# Patient Record
Sex: FEMALE
[De-identification: names, ages, dates, MRNs, and addresses within clinical notes are randomized per-mention and may not be internally consistent; named-entity substitution may affect disease eponyms.]

---

## 2017-04-24 ENCOUNTER — HOSPITAL ENCOUNTER (EMERGENCY)
Dept: HOSPITAL 14 - H.ER | Age: 82
Discharge: HOME | End: 2017-04-24
Payer: MEDICARE

## 2017-04-24 VITALS — OXYGEN SATURATION: 98 %

## 2017-04-24 VITALS
SYSTOLIC BLOOD PRESSURE: 120 MMHG | TEMPERATURE: 98 F | HEART RATE: 72 BPM | RESPIRATION RATE: 20 BRPM | DIASTOLIC BLOOD PRESSURE: 70 MMHG

## 2017-04-24 DIAGNOSIS — Z79.82: ICD-10-CM

## 2017-04-24 DIAGNOSIS — I10: ICD-10-CM

## 2017-04-24 DIAGNOSIS — M25.551: ICD-10-CM

## 2017-04-24 DIAGNOSIS — M79.604: Primary | ICD-10-CM
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
## 2017-04-24 DIAGNOSIS — E03.9: ICD-10-CM

## 2017-04-24 DIAGNOSIS — J44.9: ICD-10-CM

## 2017-04-24 DIAGNOSIS — E78.00: ICD-10-CM

## 2017-04-24 DIAGNOSIS — F03.90: ICD-10-CM

## 2017-04-24 DIAGNOSIS — E11.9: ICD-10-CM

## 2017-04-24 DIAGNOSIS — M25.561: ICD-10-CM
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
## 2017-04-24 DIAGNOSIS — Z79.84: ICD-10-CM

## 2017-04-24 NOTE — ED PDOC
Lower Extremity Pain/Injury


Time Seen by Provider: 04/24/17 15:18


Chief Complaint (Nursing): Lower Extremity Problem/Injury


Chief Complaint (Provider): Pain in leg


History/Exam Limitations: no limitations


Onset/Duration Of Symptoms: Days (3)


Current Symptoms Are (Timing): Still Present


Additional Complaint(s): 


Pt. was at [place] visiting her son.  She uses a walker usually and did 

not use it and accidentally fell in the bathroom onto the right side.  Pt. has 

pain to the right femur, hip, and knee.  Had x-rays done at an urgent care with 

no findings.  Here as pain is there and trouble walking on the right leg.  No 

numbness, tingles, headaches, neck pain, arm pain, abd pain.  No back pain, 

incontinence, constipation.  





Past Medical History


Reviewed: Nursing Documentation, Vital Signs


Vital Signs: 





 Last Vital Signs











Temp  98.0 F   04/24/17 15:03


 


Pulse  93 H  04/24/17 15:03


 


Resp  16   04/24/17 15:03


 


BP  143/74   04/24/17 15:03


 


Pulse Ox  98   04/24/17 15:03














- Medical History


PMH: Arthritis, Asthma, COPD, Dementia, Diabetes, HTN, Hypercholesterolemia, 

Hypothyroidism





- Family History


Family History: States: Unknown Family Hx





- Living Arrangements


Living Arrangements: With Family





- Social History


Current smoker - smoking cessation education provided: No


Alcohol: None


Drugs: Denies





- Immunization History


Hx Tetanus Toxoid Vaccination: No


Hx Influenza Vaccination: No


Hx Pneumococcal Vaccination: No





- Home Medications


Home Medications: 


 Ambulatory Orders











 Medication  Instructions  Recorded


 


Albuterol Sulfate [Albuterol 3 ml IH Q4 PRN 02/02/14





Sulfate 3 ml]  


 


Aspirin [Aspirin EC] 325 mg PO DAILY 02/02/14


 


Atorvastatin Calcium [Lipitor] 20 mg PO DAILY 02/02/14


 


DULoxetine [Cymbalta] 60 mg PO DAILY 02/02/14


 


Donepezil Hydrochloride 10 mg PO DAILY 02/02/14


 


Levothyroxine [Synthroid] 0.112 mg PO DAILY 02/02/14


 


Lorazepam 0.5 mg PO DAILY 02/02/14


 


Metformin Hydrochloride [Metformin] 500 mg PO BID 02/02/14


 


Montelukast [Singulair] 10 mg PO DAILY 02/02/14


 


Nitroglycerin 0.4 mg SL PRN PRN 02/02/14


 


Omeprazole 20 mg PO DAILY 02/02/14


 


Tiotropium Bromide Inhaler 1 inhaler IH PRN PRN 02/02/14





[Spiriva Inhalation Handihaler  





Device]  


 


Tramadol Hydrochloride [Tramadol 50 mg PO BID PRN 02/02/14





HCl]  


 


Valsartan [Diovan] 80 mg PO DAILY 02/02/14


 


Zolpidem Tartrate 5 mg PO HS PRN 02/02/14


 


Docusate Sodium [Colace] 100 mg PO BID #30 sgl 04/30/15


 


Methylprednisolone [Medrol Dosepak] 4 mg PO DAILY #1 packet 04/30/15


 


Oxycodone HCl/Acetaminophen 1 tab PO Q6 PRN #24 tab 04/30/15





[Percocet 325 mg-5 mg]  


 


Ibuprofen [Motrin] 600 mg PO TID 7 Days 04/24/17














- Allergies


Allergies/Adverse Reactions: 


 Allergies











Allergy/AdvReac Type Severity Reaction Status Date / Time


 


No Known Allergies Allergy   Verified 04/30/15 12:45














Review of Systems


ROS Statement: Except As Marked, All Systems Reviewed And Found Negative


Musculoskeletal: Positive for: Leg Pain





Physical Exam





- Reviewed


Nursing Documentation Reviewed: Yes


Vital Signs Reviewed: Yes





- Physical Exam


Appears: Positive for: Well, Non-toxic, No Acute Distress


Head Exam: Positive for: ATRAUMATIC, NORMAL INSPECTION, NORMOCEPHALIC


Skin: Positive for: Normal Color, Warm, DRY


Eye Exam: Positive for: Normal appearance, EOMI, PERRL


Neck: Positive for: Normal, Painless ROM


Cardiovascular/Chest: Positive for: Regular Rate, Rhythm


Respiratory: Positive for: CNT, Normal Breath Sounds


Gastrointestinal/Abdominal: Positive for: Normal Exam, Bowel Sounds, Soft.  

Negative for: Tenderness


Back: Positive for: Normal Inspection.  Negative for: L CVA Tenderness, R CVA 

Tenderness


Extremity: Positive for: Normal ROM, Tenderness (R hip, femure, and R knee;  no 

laxity of knee joints.  Pain is generalized; no swelling;  Has full ROM.).  

Negative for: Pedal Edema, Calf Tenderness


Neurologic/Psych: Positive for: Alert, Oriented





- ECG


O2 Sat by Pulse Oximetry: 98





- Radiology


X-Ray: Interpreted by Me


X-Ray Interpretation: No Acute Disease





- CT Scan/US


  ** ct


Other Rad Studies (CT/US): Read By Radiologist


Other Rad Interpretation: no acute





- Progress


ED Course And Treament: 


1810:  Stable.  Pain improved.  No fx.  Fu with pcp.  AAOx3.  Family will take 

pt. home.  





Disposition





- Clinical Impression


Clinical Impression: 


 Leg pain





- Patient ED Disposition


Is Patient to be Admitted: No


Counseled Patient/Family Regarding: Studies Performed, Diagnosis, Need For 

Followup, Rx Given





- Disposition


Referrals: 


[facility] [Outside] - 04/25/17


Disposition: Routine/Home


Disposition Time: 18:12


Condition: STABLE


Additional Instructions: 


Return if not better in 3 days. 


Prescriptions: 


Ibuprofen [Motrin] 600 mg PO TID 7 Days


Instructions:  Leg Pain (ED)

## 2017-04-24 NOTE — CT
Indication: Hip and knee pain 



Noncontrast CT of the right knee 



Comparison: None available 



Technique: Noncontrast axial images of the right knee.  Sagittal 

coronal reformatted images were generated and reviewed.   



This CT exam was performed using 1 or more of the falling dose 

reduction techniques: Automated exposure control, adjustment of the 

MAA and/or kV according to patient size, and/or use of iterative 

reconstruction technique. 



Total exam DLP: 247.80 



Findings: 



Osseous demineralization limits evaluation for acute fracture lines. 

No acute fracture. No dislocation. Degenerative changes including 

mild joint space narrowing of the medial and lateral compartments. 

Tenting of the intercondylar notch. No significant joint effusion. 



Vascular calcifications. Soft tissues appear grossly unremarkable. No 

radiopaque foreign bodies. No focal fluid collection. 



Impression: 



Osseous demineralization.  Degenerative changes.  Suggest MRI for 

further evaluation if indicated.

## 2017-04-25 NOTE — RAD
PROCEDURE:  Right Femur Radiographs.



HISTORY:

fall and pain



COMPARISON:

None.



TECHNIQUE:

AP and Lateral Radiographs of the right femur.



FINDINGS:



FEMUR:

No evidence of acute fracture.  No evidence of bony lesion. Diffuse 

osteopenia is noted.



SOFT TISSUES:

Soft tissue calcification. 



OTHER FINDINGS:

None.



IMPRESSION:

No evidence of acute pathology.

## 2018-11-01 ENCOUNTER — APPOINTMENT (EMERGENCY)
Dept: RADIOLOGY | Facility: HOSPITAL | Age: 83
DRG: 871 | End: 2018-11-01
Payer: MEDICARE

## 2018-11-01 ENCOUNTER — HOSPITAL ENCOUNTER (INPATIENT)
Facility: HOSPITAL | Age: 83
LOS: 6 days | Discharge: NURSING, REHAB OR LONG-TERM CARE | DRG: 871 | End: 2018-11-07
Attending: EMERGENCY MEDICINE | Admitting: INTERNAL MEDICINE
Payer: MEDICARE

## 2018-11-01 DIAGNOSIS — A41.9 SEPSIS (HCC): Primary | ICD-10-CM

## 2018-11-01 DIAGNOSIS — J18.9 PNEUMONIA: ICD-10-CM

## 2018-11-01 DIAGNOSIS — J18.9 LEFT UPPER LOBE PNEUMONIA: ICD-10-CM

## 2018-11-01 PROBLEM — I71.4 ABDOMINAL AORTIC ANEURYSM (AAA) 3.0 CM TO 5.0 CM IN DIAMETER IN FEMALE (HCC): Status: ACTIVE | Noted: 2018-11-01

## 2018-11-01 PROBLEM — I71.40 ABDOMINAL AORTIC ANEURYSM (AAA) 3.0 CM TO 5.0 CM IN DIAMETER IN FEMALE: Status: ACTIVE | Noted: 2018-11-01

## 2018-11-01 PROBLEM — I25.118 CORONARY ARTERY DISEASE OF NATIVE ARTERY OF NATIVE HEART WITH STABLE ANGINA PECTORIS (HCC): Status: ACTIVE | Noted: 2018-11-01

## 2018-11-01 PROBLEM — E11.9 TYPE 2 DIABETES MELLITUS WITHOUT COMPLICATION, WITHOUT LONG-TERM CURRENT USE OF INSULIN (HCC): Status: ACTIVE | Noted: 2018-11-01

## 2018-11-01 PROBLEM — E03.9 HYPOTHYROIDISM: Status: ACTIVE | Noted: 2018-11-01

## 2018-11-01 PROBLEM — J45.20 INTERMITTENT ASTHMA: Status: ACTIVE | Noted: 2018-11-01

## 2018-11-01 PROBLEM — G92.8 TOXIC METABOLIC ENCEPHALOPATHY: Status: ACTIVE | Noted: 2018-11-01

## 2018-11-01 PROBLEM — E78.5 HLD (HYPERLIPIDEMIA): Status: ACTIVE | Noted: 2018-11-01

## 2018-11-01 LAB
ALBUMIN SERPL BCP-MCNC: 2.9 G/DL (ref 3.5–5)
ALP SERPL-CCNC: 93 U/L (ref 46–116)
ALT SERPL W P-5'-P-CCNC: 11 U/L (ref 12–78)
ANION GAP SERPL CALCULATED.3IONS-SCNC: 4 MMOL/L (ref 4–13)
APTT PPP: 31 SECONDS (ref 24–36)
AST SERPL W P-5'-P-CCNC: 15 U/L (ref 5–45)
ATRIAL RATE: 111 BPM
BACTERIA UR QL AUTO: ABNORMAL /HPF
BASOPHILS # BLD MANUAL: 0 THOUSAND/UL (ref 0–0.1)
BASOPHILS NFR MAR MANUAL: 0 % (ref 0–1)
BILIRUB SERPL-MCNC: 0.46 MG/DL (ref 0.2–1)
BILIRUB UR QL STRIP: NEGATIVE
BUN SERPL-MCNC: 19 MG/DL (ref 5–25)
CALCIUM SERPL-MCNC: 8.4 MG/DL (ref 8.3–10.1)
CHLORIDE SERPL-SCNC: 101 MMOL/L (ref 100–108)
CLARITY UR: CLEAR
CO2 SERPL-SCNC: 29 MMOL/L (ref 21–32)
COLOR UR: YELLOW
CREAT SERPL-MCNC: 0.62 MG/DL (ref 0.6–1.3)
EOSINOPHIL # BLD MANUAL: 0 THOUSAND/UL (ref 0–0.4)
EOSINOPHIL NFR BLD MANUAL: 0 % (ref 0–6)
ERYTHROCYTE [DISTWIDTH] IN BLOOD BY AUTOMATED COUNT: 13.3 % (ref 11.6–15.1)
GFR SERPL CREATININE-BSD FRML MDRD: 80 ML/MIN/1.73SQ M
GLUCOSE SERPL-MCNC: 123 MG/DL (ref 65–140)
GLUCOSE SERPL-MCNC: 130 MG/DL (ref 65–140)
GLUCOSE UR STRIP-MCNC: NEGATIVE MG/DL
HCT VFR BLD AUTO: 40.1 % (ref 34.8–46.1)
HGB BLD-MCNC: 12.6 G/DL (ref 11.5–15.4)
HGB UR QL STRIP.AUTO: ABNORMAL
HYALINE CASTS #/AREA URNS LPF: ABNORMAL /LPF
INR PPP: 1.01 (ref 0.86–1.17)
KETONES UR STRIP-MCNC: ABNORMAL MG/DL
LACTATE SERPL-SCNC: 1.5 MMOL/L (ref 0.5–2)
LEUKOCYTE ESTERASE UR QL STRIP: NEGATIVE
LYMPHOCYTES # BLD AUTO: 0.89 THOUSAND/UL (ref 0.6–4.47)
LYMPHOCYTES # BLD AUTO: 3 % (ref 14–44)
MCH RBC QN AUTO: 29.9 PG (ref 26.8–34.3)
MCHC RBC AUTO-ENTMCNC: 31.4 G/DL (ref 31.4–37.4)
MCV RBC AUTO: 95 FL (ref 82–98)
METAMYELOCYTES NFR BLD MANUAL: 1 % (ref 0–1)
MONOCYTES # BLD AUTO: 1.19 THOUSAND/UL (ref 0–1.22)
MONOCYTES NFR BLD: 4 % (ref 4–12)
NEUTROPHILS # BLD MANUAL: 27.43 THOUSAND/UL (ref 1.85–7.62)
NEUTS BAND NFR BLD MANUAL: 6 % (ref 0–8)
NEUTS SEG NFR BLD AUTO: 86 % (ref 43–75)
NITRITE UR QL STRIP: POSITIVE
NON-SQ EPI CELLS URNS QL MICRO: ABNORMAL /HPF
NRBC BLD AUTO-RTO: 0 /100 WBCS
P AXIS: 66 DEGREES
PH UR STRIP.AUTO: 6.5 [PH] (ref 4.5–8)
PLATELET # BLD AUTO: 300 THOUSANDS/UL (ref 149–390)
PLATELET BLD QL SMEAR: ADEQUATE
PMV BLD AUTO: 10 FL (ref 8.9–12.7)
POTASSIUM SERPL-SCNC: 4 MMOL/L (ref 3.5–5.3)
PR INTERVAL: 172 MS
PROCALCITONIN SERPL-MCNC: 1.29 NG/ML
PROT SERPL-MCNC: 6.7 G/DL (ref 6.4–8.2)
PROT UR STRIP-MCNC: >=300 MG/DL
PROTHROMBIN TIME: 13.4 SECONDS (ref 11.8–14.2)
QRS AXIS: 66 DEGREES
QRSD INTERVAL: 94 MS
QT INTERVAL: 342 MS
QTC INTERVAL: 465 MS
RBC # BLD AUTO: 4.21 MILLION/UL (ref 3.81–5.12)
RBC #/AREA URNS AUTO: ABNORMAL /HPF
SODIUM SERPL-SCNC: 134 MMOL/L (ref 136–145)
SP GR UR STRIP.AUTO: 1.02 (ref 1–1.03)
T WAVE AXIS: 77 DEGREES
TROPONIN I SERPL-MCNC: <0.02 NG/ML
UROBILINOGEN UR QL STRIP.AUTO: 1 E.U./DL
VENTRICULAR RATE: 111 BPM
WBC # BLD AUTO: 29.82 THOUSAND/UL (ref 4.31–10.16)
WBC #/AREA URNS AUTO: ABNORMAL /HPF

## 2018-11-01 PROCEDURE — 85007 BL SMEAR W/DIFF WBC COUNT: CPT | Performed by: EMERGENCY MEDICINE

## 2018-11-01 PROCEDURE — 94668 MNPJ CHEST WALL SBSQ: CPT

## 2018-11-01 PROCEDURE — 82948 REAGENT STRIP/BLOOD GLUCOSE: CPT

## 2018-11-01 PROCEDURE — 36415 COLL VENOUS BLD VENIPUNCTURE: CPT

## 2018-11-01 PROCEDURE — 74177 CT ABD & PELVIS W/CONTRAST: CPT

## 2018-11-01 PROCEDURE — 93005 ELECTROCARDIOGRAM TRACING: CPT

## 2018-11-01 PROCEDURE — 96365 THER/PROPH/DIAG IV INF INIT: CPT

## 2018-11-01 PROCEDURE — 1124F ACP DISCUSS-NO DSCNMKR DOCD: CPT | Performed by: NURSE PRACTITIONER

## 2018-11-01 PROCEDURE — 85610 PROTHROMBIN TIME: CPT | Performed by: EMERGENCY MEDICINE

## 2018-11-01 PROCEDURE — 71260 CT THORAX DX C+: CPT

## 2018-11-01 PROCEDURE — 85730 THROMBOPLASTIN TIME PARTIAL: CPT | Performed by: EMERGENCY MEDICINE

## 2018-11-01 PROCEDURE — 84484 ASSAY OF TROPONIN QUANT: CPT | Performed by: EMERGENCY MEDICINE

## 2018-11-01 PROCEDURE — 93010 ELECTROCARDIOGRAM REPORT: CPT | Performed by: INTERNAL MEDICINE

## 2018-11-01 PROCEDURE — 85027 COMPLETE CBC AUTOMATED: CPT | Performed by: EMERGENCY MEDICINE

## 2018-11-01 PROCEDURE — 80053 COMPREHEN METABOLIC PANEL: CPT | Performed by: EMERGENCY MEDICINE

## 2018-11-01 PROCEDURE — 83605 ASSAY OF LACTIC ACID: CPT | Performed by: EMERGENCY MEDICINE

## 2018-11-01 PROCEDURE — 94760 N-INVAS EAR/PLS OXIMETRY 1: CPT

## 2018-11-01 PROCEDURE — 94640 AIRWAY INHALATION TREATMENT: CPT

## 2018-11-01 PROCEDURE — 96361 HYDRATE IV INFUSION ADD-ON: CPT

## 2018-11-01 PROCEDURE — 84145 PROCALCITONIN (PCT): CPT | Performed by: INTERNAL MEDICINE

## 2018-11-01 PROCEDURE — 87040 BLOOD CULTURE FOR BACTERIA: CPT | Performed by: EMERGENCY MEDICINE

## 2018-11-01 PROCEDURE — 99223 1ST HOSP IP/OBS HIGH 75: CPT | Performed by: INTERNAL MEDICINE

## 2018-11-01 PROCEDURE — 71045 X-RAY EXAM CHEST 1 VIEW: CPT

## 2018-11-01 PROCEDURE — 99285 EMERGENCY DEPT VISIT HI MDM: CPT

## 2018-11-01 PROCEDURE — 81001 URINALYSIS AUTO W/SCOPE: CPT

## 2018-11-01 RX ORDER — SODIUM CHLORIDE FOR INHALATION 0.9 %
3 VIAL, NEBULIZER (ML) INHALATION
Status: DISCONTINUED | OUTPATIENT
Start: 2018-11-02 | End: 2018-11-07 | Stop reason: HOSPADM

## 2018-11-01 RX ORDER — ZOLPIDEM TARTRATE 5 MG/1
5 TABLET ORAL
COMMUNITY
End: 2021-09-07

## 2018-11-01 RX ORDER — ACETAMINOPHEN 325 MG/1
650 TABLET ORAL EVERY 6 HOURS PRN
Status: DISCONTINUED | OUTPATIENT
Start: 2018-11-01 | End: 2018-11-07 | Stop reason: HOSPADM

## 2018-11-01 RX ORDER — ALBUTEROL SULFATE 2.5 MG/3ML
5 SOLUTION RESPIRATORY (INHALATION) ONCE
Status: COMPLETED | OUTPATIENT
Start: 2018-11-01 | End: 2018-11-01

## 2018-11-01 RX ORDER — MONTELUKAST SODIUM 10 MG/1
10 TABLET ORAL
COMMUNITY
End: 2022-08-01 | Stop reason: SDUPTHER

## 2018-11-01 RX ORDER — LEVOTHYROXINE SODIUM 112 UG/1
112 TABLET ORAL DAILY
COMMUNITY
End: 2021-09-07

## 2018-11-01 RX ORDER — INSULIN GLARGINE 100 [IU]/ML
5 INJECTION, SOLUTION SUBCUTANEOUS
Status: DISCONTINUED | OUTPATIENT
Start: 2018-11-01 | End: 2018-11-04

## 2018-11-01 RX ORDER — LORAZEPAM 0.5 MG/1
0.5 TABLET ORAL 3 TIMES DAILY
COMMUNITY
End: 2021-09-07

## 2018-11-01 RX ORDER — LEVALBUTEROL 1.25 MG/.5ML
1.25 SOLUTION, CONCENTRATE RESPIRATORY (INHALATION) EVERY 4 HOURS PRN
Status: DISCONTINUED | OUTPATIENT
Start: 2018-11-01 | End: 2018-11-01

## 2018-11-01 RX ORDER — MONTELUKAST SODIUM 10 MG/1
10 TABLET ORAL
Status: DISCONTINUED | OUTPATIENT
Start: 2018-11-01 | End: 2018-11-07 | Stop reason: HOSPADM

## 2018-11-01 RX ORDER — LEVALBUTEROL 1.25 MG/.5ML
1.25 SOLUTION, CONCENTRATE RESPIRATORY (INHALATION)
Status: DISCONTINUED | OUTPATIENT
Start: 2018-11-02 | End: 2018-11-07 | Stop reason: HOSPADM

## 2018-11-01 RX ORDER — NITROGLYCERIN 0.4 MG/1
0.4 TABLET SUBLINGUAL
COMMUNITY

## 2018-11-01 RX ORDER — ACETAMINOPHEN 325 MG/1
650 TABLET ORAL ONCE
Status: DISCONTINUED | OUTPATIENT
Start: 2018-11-01 | End: 2018-11-07 | Stop reason: HOSPADM

## 2018-11-01 RX ORDER — LEVOTHYROXINE SODIUM 112 UG/1
112 TABLET ORAL DAILY
Status: DISCONTINUED | OUTPATIENT
Start: 2018-11-02 | End: 2018-11-07 | Stop reason: HOSPADM

## 2018-11-01 RX ORDER — LEVALBUTEROL 1.25 MG/.5ML
1.25 SOLUTION, CONCENTRATE RESPIRATORY (INHALATION)
Status: DISCONTINUED | OUTPATIENT
Start: 2018-11-01 | End: 2018-11-01

## 2018-11-01 RX ORDER — SODIUM CHLORIDE 9 MG/ML
100 INJECTION, SOLUTION INTRAVENOUS CONTINUOUS
Status: DISPENSED | OUTPATIENT
Start: 2018-11-01 | End: 2018-11-02

## 2018-11-01 RX ORDER — OMEPRAZOLE 20 MG/1
20 CAPSULE, DELAYED RELEASE ORAL DAILY
COMMUNITY
End: 2022-08-01 | Stop reason: SDUPTHER

## 2018-11-01 RX ORDER — PANTOPRAZOLE SODIUM 20 MG/1
20 TABLET, DELAYED RELEASE ORAL
Status: DISCONTINUED | OUTPATIENT
Start: 2018-11-02 | End: 2018-11-07 | Stop reason: HOSPADM

## 2018-11-01 RX ORDER — GABAPENTIN 300 MG/1
300 CAPSULE ORAL DAILY
Status: DISCONTINUED | OUTPATIENT
Start: 2018-11-02 | End: 2018-11-07 | Stop reason: HOSPADM

## 2018-11-01 RX ORDER — ERGOCALCIFEROL (VITAMIN D2) 1250 MCG
50000 CAPSULE ORAL WEEKLY
COMMUNITY
End: 2021-09-07

## 2018-11-01 RX ORDER — ATORVASTATIN CALCIUM 20 MG/1
20 TABLET, FILM COATED ORAL DAILY
Status: DISCONTINUED | OUTPATIENT
Start: 2018-11-02 | End: 2018-11-07 | Stop reason: HOSPADM

## 2018-11-01 RX ORDER — LORAZEPAM 0.5 MG/1
0.5 TABLET ORAL EVERY 8 HOURS PRN
Status: DISCONTINUED | OUTPATIENT
Start: 2018-11-01 | End: 2018-11-07 | Stop reason: HOSPADM

## 2018-11-01 RX ORDER — ALBUTEROL SULFATE 2.5 MG/3ML
2.5 SOLUTION RESPIRATORY (INHALATION) EVERY 4 HOURS PRN
Status: DISCONTINUED | OUTPATIENT
Start: 2018-11-01 | End: 2018-11-07 | Stop reason: HOSPADM

## 2018-11-01 RX ORDER — GABAPENTIN 300 MG/1
300 CAPSULE ORAL DAILY
COMMUNITY
End: 2021-09-07

## 2018-11-01 RX ORDER — GUAIFENESIN 600 MG
1200 TABLET, EXTENDED RELEASE 12 HR ORAL EVERY 12 HOURS SCHEDULED
COMMUNITY
End: 2021-09-07

## 2018-11-01 RX ORDER — ATORVASTATIN CALCIUM 20 MG/1
20 TABLET, FILM COATED ORAL DAILY
COMMUNITY
End: 2021-09-07

## 2018-11-01 RX ORDER — DULOXETIN HYDROCHLORIDE 20 MG/1
20 CAPSULE, DELAYED RELEASE ORAL DAILY
Status: DISCONTINUED | OUTPATIENT
Start: 2018-11-02 | End: 2018-11-07 | Stop reason: HOSPADM

## 2018-11-01 RX ORDER — GUAIFENESIN 600 MG
1200 TABLET, EXTENDED RELEASE 12 HR ORAL EVERY 12 HOURS SCHEDULED
Status: DISCONTINUED | OUTPATIENT
Start: 2018-11-01 | End: 2018-11-07 | Stop reason: HOSPADM

## 2018-11-01 RX ORDER — ALBUTEROL SULFATE 2.5 MG/3ML
2.5 SOLUTION RESPIRATORY (INHALATION) EVERY 6 HOURS PRN
COMMUNITY
End: 2021-11-05 | Stop reason: SDUPTHER

## 2018-11-01 RX ORDER — SODIUM CHLORIDE FOR INHALATION 0.9 %
VIAL, NEBULIZER (ML) INHALATION
Status: COMPLETED
Start: 2018-11-01 | End: 2018-11-01

## 2018-11-01 RX ORDER — DOCUSATE SODIUM 100 MG/1
100 CAPSULE, LIQUID FILLED ORAL 2 TIMES DAILY
Status: DISCONTINUED | OUTPATIENT
Start: 2018-11-01 | End: 2018-11-07 | Stop reason: HOSPADM

## 2018-11-01 RX ORDER — DULOXETIN HYDROCHLORIDE 60 MG/1
20 CAPSULE, DELAYED RELEASE ORAL DAILY
COMMUNITY
End: 2021-09-07

## 2018-11-01 RX ADMIN — VANCOMYCIN HYDROCHLORIDE 750 MG: 750 INJECTION, SOLUTION INTRAVENOUS at 14:48

## 2018-11-01 RX ADMIN — CEFEPIME HYDROCHLORIDE 2000 MG: 2 INJECTION, POWDER, FOR SOLUTION INTRAVENOUS at 13:40

## 2018-11-01 RX ADMIN — ALBUTEROL SULFATE 5 MG: 2.5 SOLUTION RESPIRATORY (INHALATION) at 14:20

## 2018-11-01 RX ADMIN — SODIUM CHLORIDE 100 ML/HR: 0.9 INJECTION, SOLUTION INTRAVENOUS at 20:52

## 2018-11-01 RX ADMIN — IOHEXOL 100 ML: 350 INJECTION, SOLUTION INTRAVENOUS at 15:54

## 2018-11-01 RX ADMIN — SODIUM CHLORIDE 2000 ML: 0.9 INJECTION, SOLUTION INTRAVENOUS at 13:40

## 2018-11-01 RX ADMIN — ISODIUM CHLORIDE 3 ML: 0.03 SOLUTION RESPIRATORY (INHALATION) at 20:47

## 2018-11-01 RX ADMIN — IPRATROPIUM BROMIDE 0.5 MG: 0.5 SOLUTION RESPIRATORY (INHALATION) at 14:20

## 2018-11-01 RX ADMIN — LEVALBUTEROL HYDROCHLORIDE 1.25 MG: 1.25 SOLUTION, CONCENTRATE RESPIRATORY (INHALATION) at 20:47

## 2018-11-01 NOTE — ASSESSMENT & PLAN NOTE
No results found for: HGBA1C    No results for input(s): POCGLU in the last 72 hours  Blood Sugar Average: Last 72 hrs:     Hold metformin while hospitalized  Will start basal Lantus, can discontinue upon discharge when restart metformin  Sliding scale coverage  Carb controlled diet

## 2018-11-01 NOTE — ED PROVIDER NOTES
History  Chief Complaint   Patient presents with    Lethargy     patient visiting from [place] for the week  Patient daughter states she has had a cough for a few days  Patient arrives to ER with foul smelling urine  This is an 60-year-old female with history of dementia presenting to to the emergency department for evaluation of generalized weakness  Per daughter has had foul-smelling urine  Has been altered mental status and increasing confusion  The patient herself denies fevers chills abdominal pain nausea vomiting or urinary symptoms  Has not had any chest discomfort cough shortness of breath  Prior to Admission Medications   Prescriptions Last Dose Informant Patient Reported? Taking?    DULoxetine (CYMBALTA) 60 mg delayed release capsule   Yes Yes   Sig: Take 20 mg by mouth daily   LORazepam (ATIVAN) 0 5 mg tablet   Yes Yes   Sig: Take 0 5 mg by mouth 3 (three) times a day   albuterol (2 5 mg/3 mL) 0 083 % nebulizer solution   Yes Yes   Sig: Take 2 5 mg by nebulization every 6 (six) hours as needed for wheezing or shortness of breath   atorvastatin (LIPITOR) 20 mg tablet   Yes Yes   Sig: Take 20 mg by mouth daily   ergocalciferol (ERGOCALCIFEROL) 09215 units capsule   Yes Yes   Sig: Take 50,000 Units by mouth once a week   gabapentin (NEURONTIN) 300 mg capsule   Yes Yes   Sig: Take 300 mg by mouth daily     guaiFENesin (MUCINEX) 600 mg 12 hr tablet   Yes Yes   Sig: Take 1,200 mg by mouth every 12 (twelve) hours   levothyroxine 112 mcg tablet   Yes Yes   Sig: Take 112 mcg by mouth daily   metFORMIN (GLUCOPHAGE) 500 mg tablet   Yes Yes   Sig: Take 500 mg by mouth 2 (two) times a day with meals   montelukast (SINGULAIR) 10 mg tablet   Yes Yes   Sig: Take 10 mg by mouth daily at bedtime   nitroglycerin (NITROSTAT) 0 4 mg SL tablet   Yes Yes   Sig: Place 0 4 mg under the tongue every 5 (five) minutes as needed for chest pain   omeprazole (PriLOSEC) 20 mg delayed release capsule   Yes Yes   Sig: Take 20 mg by mouth daily   zolpidem (AMBIEN) 5 mg tablet   Yes Yes   Sig: Take 5 mg by mouth daily at bedtime as needed for sleep      Facility-Administered Medications: None       Past Medical History:   Diagnosis Date    Bell's palsy remote    Coronary artery disease     Diabetes mellitus ([address] )     Disease of thyroid gland     GERD (gastroesophageal reflux disease)     Hyperlipidemia     Hypertension     Psychiatric disorder     Stroke [facility])        History reviewed  No pertinent surgical history  History reviewed  No pertinent family history  I have reviewed and agree with the history as documented  Social History   Substance Use Topics    Smoking status: Former Smoker    Smokeless tobacco: Never Used    Alcohol use No        Review of Systems   Constitutional: Negative for appetite change, chills, fatigue and fever  HENT: Negative for sneezing and sore throat  Eyes: Negative for visual disturbance  Respiratory: Negative for cough, choking, chest tightness, shortness of breath and wheezing  Cardiovascular: Negative for chest pain and palpitations  Gastrointestinal: Negative for abdominal pain, constipation, diarrhea, nausea and vomiting  Genitourinary: Negative for difficulty urinating and dysuria  Neurological: Negative for dizziness, weakness, light-headedness, numbness and headaches  Psychiatric/Behavioral: Positive for confusion  All other systems reviewed and are negative        Physical Exam  ED Triage Vitals [11/01/18 1228]   Temperature Pulse Respirations Blood Pressure SpO2   100 5 °F (38 1 °C) (!) 113 19 165/80 (!) 88 %      Temp Source Heart Rate Source Patient Position - Orthostatic VS BP Location FiO2 (%)   Oral Monitor Lying Right arm --      Pain Score       No Pain           Orthostatic Vital Signs  Vitals:    11/03/18 1603 11/03/18 2154 11/04/18 0750 11/04/18 1557   BP: 152/78 140/62 165/81 153/74   Pulse: 100 100 102 87   Patient Position - Orthostatic VS: Lying Lying Lying Lying       Physical Exam   Constitutional: She is oriented to person, place, and time  She appears well-developed and well-nourished  No distress  HENT:   Head: Normocephalic and atraumatic  Mouth/Throat: Oropharynx is clear and moist    Eyes: Pupils are equal, round, and reactive to light  EOM are normal    Neck: No JVD present  No tracheal deviation present  Cardiovascular: Normal rate, regular rhythm, normal heart sounds and intact distal pulses  Exam reveals no gallop and no friction rub  No murmur heard  Pulmonary/Chest: Effort normal and breath sounds normal  No respiratory distress  She has no wheezes  She has no rales  Abdominal: Soft  Bowel sounds are normal  She exhibits no distension  There is no tenderness  There is no rebound and no guarding  Neurological: She is alert and oriented to person, place, and time  No cranial nerve deficit  She exhibits normal muscle tone  Skin: Skin is warm and dry  She is not diaphoretic  No pallor  Psychiatric: She has a normal mood and affect  Her behavior is normal    Nursing note and vitals reviewed        ED Medications  Medications   acetaminophen (TYLENOL) tablet 650 mg (650 mg Oral Not Given 11/1/18 1608)   atorvastatin (LIPITOR) tablet 20 mg (20 mg Oral Given 11/4/18 0819)   DULoxetine (CYMBALTA) delayed release capsule 20 mg (20 mg Oral Given 11/4/18 0819)   gabapentin (NEURONTIN) capsule 300 mg (300 mg Oral Given 11/4/18 0819)   guaiFENesin (MUCINEX) 12 hr tablet 1,200 mg (1,200 mg Oral Given 11/4/18 0819)   levothyroxine tablet 112 mcg ( Oral Canceled Entry 11/4/18 0700)   LORazepam (ATIVAN) tablet 0 5 mg (not administered)   montelukast (SINGULAIR) tablet 10 mg (10 mg Oral Given 11/3/18 2214)   pantoprazole (PROTONIX) EC tablet 20 mg (20 mg Oral Given 11/4/18 0510)   sodium chloride 0 9 % infusion (0 mL/hr Intravenous Stopped 11/2/18 0900)   acetaminophen (TYLENOL) tablet 650 mg (650 mg Oral Given 11/2/18 1157)   docusate sodium (COLACE) capsule 100 mg ( Oral Canceled Entry 11/4/18 1800)   enoxaparin (LOVENOX) subcutaneous injection 40 mg (40 mg Subcutaneous Given 11/4/18 0819)   insulin glargine (LANTUS) subcutaneous injection 5 Units 0 05 mL (5 Units Subcutaneous Given 11/3/18 2214)   insulin lispro (HumaLOG) 100 units/mL subcutaneous injection 1-5 Units (1 Units Subcutaneous Not Given 11/4/18 1629)   levalbuterol (XOPENEX) inhalation solution 1 25 mg (1 25 mg Nebulization Given 11/4/18 1303)   sodium chloride 0 9 % inhalation solution 3 mL (3 mL Nebulization Given 11/4/18 1303)   albuterol inhalation solution 2 5 mg (not administered)   pneumococcal 13-valent conjugate vaccine (PREVNAR-13) IM injection 0 5 mL (not administered)   potassium chloride 20 mEq IVPB (premix) ( Intravenous Canceled Entry 11/4/18 1800)   piperacillin-tazobactam (ZOSYN) 3 375 g in sodium chloride 0 9 % 50 mL IVPB (0 g Intravenous Stopped 11/4/18 1809)   dextrose 5 % and sodium chloride 0 9 % infusion (50 mL/hr Intravenous New Bag 11/4/18 1635)   sodium chloride 0 9 % bolus 2,000 mL (0 mL Intravenous Stopped 11/1/18 1540)   vancomycin (VANCOCIN) IVPB (premix) 750 mg (0 mg/kg × 56 7 kg Intravenous Stopped 11/1/18 1548)   cefepime (MAXIPIME) 2 g/50 mL dextrose IVPB (0 mg Intravenous Stopped 11/1/18 1448)   albuterol inhalation solution 5 mg (5 mg Nebulization Given 11/1/18 1420)   ipratropium (ATROVENT) 0 02 % inhalation solution 0 5 mg (0 5 mg Nebulization Given 11/1/18 1420)   iohexol (OMNIPAQUE) 350 MG/ML injection (MULTI-DOSE) 100 mL (100 mL Intravenous Given 11/1/18 1554)   sodium chloride 0 9 % inhalation solution **ADS Override Pull** (3 mL  Given 11/1/18 2047)   potassium chloride (K-DUR,KLOR-CON) CR tablet 40 mEq (40 mEq Oral Given 11/3/18 1445)       Diagnostic Studies  Results Reviewed     Procedure Component Value Units Date/Time    Blood culture #1 [[id number]] Collected:  11/01/18 1234    Lab Status:  Preliminary result Specimen:  Blood from Arm, Right Updated:  11/04/18 1401     Blood Culture No Growth at 72 hrs  Blood culture #2 [[id number]] Collected:  11/01/18 1233    Lab Status:  Preliminary result Specimen:  Blood from Arm, Left Updated:  11/04/18 1401     Blood Culture No Growth at 72 hrs  CBC and differential [[id number]]  (Abnormal) Collected:  11/01/18 1233    Lab Status:  Final result Specimen:  Blood from Arm, Left Updated:  11/01/18 1431     WBC 29 82 (H) Thousand/uL      RBC 4 21 Million/uL      Hemoglobin 12 6 g/dL      Hematocrit 40 1 %      MCV 95 fL      MCH 29 9 pg      MCHC 31 4 g/dL      RDW 13 3 %      MPV 10 0 fL      Platelets 046 Thousands/uL      nRBC 0 /100 WBCs     Narrative: This is an appended report  These results have been appended to a previously verified report  Troponin I [[id number]]  (Normal) Collected:  11/01/18 1233    Lab Status:  Final result Specimen:  Blood from Arm, Left Updated:  11/01/18 1346     Troponin I <0 02 ng/mL     Lactic Acid x2 [[id number]]  (Normal) Collected:  11/01/18 1233    Lab Status:  Final result Specimen:  Blood from Arm, Left Updated:  11/01/18 1307     LACTIC ACID 1 5 mmol/L     Narrative:         Result may be elevated if tourniquet was used during collection      Urine Microscopic [[id number]]  (Abnormal) Collected:  11/01/18 1241    Lab Status:  Final result Specimen:  Urine from Urine, Other Updated:  11/01/18 1306     RBC, UA 4-10 (A) /hpf      WBC, UA None Seen /hpf      Epithelial Cells None Seen /hpf      Bacteria, UA Innumerable (A) /hpf      Hyaline Casts, UA None Seen /lpf     Comprehensive metabolic panel [[id number]]  (Abnormal) Collected:  11/01/18 1233    Lab Status:  Final result Specimen:  Blood from Arm, Left Updated:  11/01/18 1304     Sodium 134 (L) mmol/L      Potassium 4 0 mmol/L      Chloride 101 mmol/L      CO2 29 mmol/L      ANION GAP 4 mmol/L      BUN 19 mg/dL      Creatinine 0 62 mg/dL      Glucose 130 mg/dL      Calcium 8 4 mg/dL      AST 15 U/L      ALT 11 (L) U/L Alkaline Phosphatase 93 U/L      Total Protein 6 7 g/dL      Albumin 2 9 (L) g/dL      Total Bilirubin 0 46 mg/dL      eGFR 80 ml/min/1 73sq m     Narrative:         National Kidney Disease Education Program recommendations are as follows:  GFR calculation is accurate only with a steady state creatinine  Chronic Kidney disease less than 60 ml/min/1 73 sq  meters  Kidney failure less than 15 ml/min/1 73 sq  meters  APTT [[id number]]  (Normal) Collected:  11/01/18 1233    Lab Status:  Final result Specimen:  Blood from Arm, Left Updated:  11/01/18 1258     PTT 31 seconds     Protime-INR [[id number]]  (Normal) Collected:  11/01/18 1233    Lab Status:  Final result Specimen:  Blood from Arm, Left Updated:  11/01/18 1258     Protime 13 4 seconds      INR 1 01    ED Urine Macroscopic [[id number]]  (Abnormal) Collected:  11/01/18 1241    Lab Status:  Final result Specimen:  Urine Updated:  11/01/18 1237     Color, UA Yellow     Clarity, UA Clear     pH, UA 6 5     Leukocytes, UA Negative     Nitrite, UA Positive (A)     Protein, UA >=300 (A) mg/dl      Glucose, UA Negative mg/dl      Ketones, UA Trace (A) mg/dl      Urobilinogen, UA 1 0 E U /dl      Bilirubin, UA Negative     Blood, UA Moderate (A)     Specific [place], UA 1 025    Narrative:       CLINITEK RESULT                 CT chest abdomen pelvis w contrast   Final Result by Bubba Perez MD (11/01 0205)      1  Emphysema and pulmonary fibrosis  2   Patchy opacities, most likely pneumonia, in the left upper lobe and lingula  3   Dilated central pulmonary arteries, consistent with pulmonary arterial hypertension  4   Nonspecific mild mediastinal lymphadenopathy  5   5 cm infrarenal abdominal aortic aneurysm  6   No evidence of acute abnormality in the abdomen or pelvis              Workstation performed: [id number]         X-ray chest 1 view portable   Final Result by Roderick Oliveros MD (11/01 0836)      Patchy densities in the left perihilar and right lower lung field may represent developing pneumonia  Continued follow-up recommended  Workstation performed: [id number]               Procedures  Procedures      Phone Consults  ED Phone Contact    ED Course           Identification of Seniors at Risk      Most Recent Value   (ISAR) Identification of Seniors at Risk   Before the illness or injury that brought you to the Emergency, did you need someone to help you on a regular basis? 1 Filed at: 11/01/2018 1229   In the last 24 hours, have you needed more help than usual?  1 Filed at: 11/01/2018 1229   Have you been hospitalized for one or more nights during the past 6 months? 0 Filed at: 11/01/2018 1229   In general, do you see well? 1 Filed at: 11/01/2018 1229   In general, do you have serious problems with your memory? 0 Filed at: 11/01/2018 1229   Do you take more than three different medications every day? 1 Filed at: 11/01/2018 1229   ISAR Score  4 Filed at: 11/01/2018 1229                          MDM  Number of Diagnoses or Management Options  Pneumonia:   Sepsis [facility]):   Diagnosis management comments: 40-year-old female who altered mental status  Septic workup, will admit for AMS  Will check CT and blood glucose    CritCare Time    Disposition  Final diagnoses:   Sepsis ([facility] [address] )   Pneumonia     Time reflects when diagnosis was documented in both MDM as applicable and the Disposition within this note     Time User Action Codes Description Comment    11/1/2018  5:26 PM Chang Fu Add [A41 9] Sepsis ([address] )     11/1/2018  5:27 PM Donna Fu Add [J18 9] Pneumonia       ED Disposition     ED Disposition Condition Comment    Admit  Case was discussed with LEE and the patient's admission status was agreed to be Admission Status: inpatient status to the service of Dr Man Choe           Follow-up Information     Follow up With Specialties Details Why Contact Info    Cosme De Luna MD Vascular Surgery, Radiology Follow up please call to schedule follow up within three months with vascular surgery to monitor aneurysm  [address]  [phone]            Current Discharge Medication List      CONTINUE these medications which have NOT CHANGED    Details   albuterol (2 5 mg/3 mL) 0 083 % nebulizer solution Take 2 5 mg by nebulization every 6 (six) hours as needed for wheezing or shortness of breath      atorvastatin (LIPITOR) 20 mg tablet Take 20 mg by mouth daily      DULoxetine (CYMBALTA) 60 mg delayed release capsule Take 20 mg by mouth daily      ergocalciferol (ERGOCALCIFEROL) 07157 units capsule Take 50,000 Units by mouth once a week      gabapentin (NEURONTIN) 300 mg capsule Take 300 mg by mouth daily        guaiFENesin (MUCINEX) 600 mg 12 hr tablet Take 1,200 mg by mouth every 12 (twelve) hours      levothyroxine 112 mcg tablet Take 112 mcg by mouth daily      LORazepam (ATIVAN) 0 5 mg tablet Take 0 5 mg by mouth 3 (three) times a day      metFORMIN (GLUCOPHAGE) 500 mg tablet Take 500 mg by mouth 2 (two) times a day with meals      montelukast (SINGULAIR) 10 mg tablet Take 10 mg by mouth daily at bedtime      nitroglycerin (NITROSTAT) 0 4 mg SL tablet Place 0 4 mg under the tongue every 5 (five) minutes as needed for chest pain      omeprazole (PriLOSEC) 20 mg delayed release capsule Take 20 mg by mouth daily      zolpidem (AMBIEN) 5 mg tablet Take 5 mg by mouth daily at bedtime as needed for sleep           No discharge procedures on file  ED Provider  Attending physically available and evaluated Julianne Castleman I managed the patient along with the ED Attending      Electronically Signed by         Nick Copeland MD  11/04/18 3107

## 2018-11-01 NOTE — H&P
H&P- Gilbert Harper 10/31/1929, 80 y o  female MRN: [id number]    Unit/Bed#: ED 12 Encounter: [id number]    Primary Care Provider: No primary care provider on file  Date and time admitted to hospital: 11/1/2018 12:15 PM        * Left upper lobe pneumonia [facility])   Assessment & Plan    With sepsis  Community-acquired  Admit to inpatient  Start ceftriaxone and azithromycin  Await blood and sputum cultures  Await pneumonia antigens  Monitor temps, procalcitonin, and WBC count  Supplemental oxygen  Airway clearance protocol  P r n  Bronchodilators  Mucinex     Toxic metabolic encephalopathy   Assessment & Plan    Due to sepsis  Treatment as above     Intermittent asthma   Assessment & Plan    Appears stable at this time  Continue Singulair and bronchodilators     Coronary artery disease of native artery of native heart with stable angina pectoris [facility])   Assessment & Plan    Continue statin  P r n  Nitroglycerin  At this time due to encephalopathy unable to gather further information regarding her cardiac disease  Abdominal aortic aneurysm (AAA) 3 0 cm to 5 0 cm in diameter in female [facility])   Assessment & Plan    Incidentally found on CT scan today  Will need vascular surgery follow-up as an outpatient     HLD (hyperlipidemia)   Assessment & Plan    Continue statin     Type 2 diabetes mellitus without complication, without long-term current use of insulin (HCC)   Assessment & Plan    No results found for: HGBA1C    No results for input(s): POCGLU in the last 72 hours  Blood Sugar Average: Last 72 hrs:     Hold metformin while hospitalized  Will start basal Lantus, can discontinue upon discharge when restart metformin  Sliding scale coverage  Carb controlled diet  Hypothyroidism   Assessment & Plan    Continue levothyroxine           History of Present Illness     HPI:  Gilbert Harper is a 80 y o  female who presents with lethargy and confusion    The majority history gathering was gathered from the patient's daughter was at the bedside  The patient is visiting her daughter from [place], she has been here since Tuesday has been developing progressive lethargy and fatigue  Her daughter also notes a cough  The patient is somnolent but arousable provides very little history  After her daughter brought her from [place] to her home here she has been spending the majority of time in bed  Appetite has been poor  Denies any fever, chills  Denies any shortness of breath  Denies chest pain  Review of Systems   All other systems reviewed and are negative  Historical Information   Past Medical History:   Diagnosis Date    Coronary artery disease     Diabetes mellitus ([facility] [address] )     Disease of thyroid gland     GERD (gastroesophageal reflux disease)     Hyperlipidemia     Hypertension     Psychiatric disorder     Stroke [facility])      History reviewed  No pertinent surgical history  Social History   History   Alcohol Use No     History   Drug Use No     History   Smoking Status    Former Smoker   Smokeless Tobacco    Never Used     Family History: non-contributory    Meds/Allergies   all medications and allergies reviewed  No Known Allergies    Objective   Vitals: Blood pressure 157/67, pulse (!) 116, temperature 100 5 °F (38 1 °C), temperature source Oral, resp  rate 22, weight 56 7 kg (125 lb), SpO2 94 %  Intake/Output Summary (Last 24 hours) at 11/01/18 1850  Last data filed at 11/01/18 1548   Gross per 24 hour   Intake             2700 ml   Output                0 ml   Net             2700 ml       Invasive Devices     Peripheral Intravenous Line            Peripheral IV 11/01/18 Left Antecubital less than 1 day    Peripheral IV 11/01/18 Right Forearm less than 1 day                Physical Exam   Constitutional: She appears well-developed and well-nourished  Elderly female lying in bed somnolent   HENT:   Head: Normocephalic and atraumatic     Eyes: Pupils are equal, round, and reactive to light  EOM are normal  No scleral icterus  Neck: Neck supple  No JVD present  Cardiovascular: Normal rate and regular rhythm  Pulmonary/Chest: Effort normal  She has no wheezes  She has rales  Abdominal: Soft  There is no tenderness  Musculoskeletal: She exhibits no edema  Neurological:   Somnolent but arouses to voice  Moving all 4 extremities  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  Skin: Skin is warm and dry  Lab Results: I have personally reviewed pertinent reports  Imaging: I have personally reviewed pertinent reports  EKG, Pathology, and Other Studies: I have personally reviewed pertinent reports  Code Status: No Order  Advance Directive and Living Will:      Power of :    POLST:      Counseling / Coordination of Care  Total floor / unit time spent today 45 minutes  Greater than 50% of total time was spent with the patient and / or family counseling and / or coordination of care  A description of the counseling / coordination of care:  Discussed plan of care with the patient and her daughter at the bedside

## 2018-11-01 NOTE — ED NOTES
Spoke with granddaughter and daughter   Attempting to get doses for medication list       Mirta Meza RN  11/01/18 5914

## 2018-11-01 NOTE — ED ATTENDING ATTESTATION
Padmini Coleman DO, saw and evaluated the patient  I have discussed the patient with the resident/non-physician practitioner and agree with the resident's/non-physician practitioner's findings, Plan of Care, and MDM as documented in the resident's/non-physician practitioner's note, except where noted  All available labs and Radiology studies were reviewed  At this point I agree with the current assessment done in the Emergency Department  I have conducted an independent evaluation of this patient a history and physical is as follows:    81 yo female presents for evaluation of generalized weakness, fatigue, cough for a couple days  Hx obtained from daughter  Apparently has had foul smelling urine as well  Septic eval, broad spec abx, IVF, admit for further eval and tx        Critical Care Time  CritCare Time    Procedures

## 2018-11-01 NOTE — ASSESSMENT & PLAN NOTE
Continue statin  P r n  Nitroglycerin  At this time due to encephalopathy unable to gather further information regarding her cardiac disease

## 2018-11-01 NOTE — ASSESSMENT & PLAN NOTE
With sepsis  Community-acquired  Admit to inpatient  Start ceftriaxone and azithromycin  Await blood and sputum cultures  Await pneumonia antigens  Monitor temps, procalcitonin, and WBC count  Supplemental oxygen  Airway clearance protocol  P r n  Bronchodilators    Mucinex

## 2018-11-02 LAB
ANION GAP SERPL CALCULATED.3IONS-SCNC: 6 MMOL/L (ref 4–13)
BASOPHILS # BLD AUTO: 0.07 THOUSANDS/ΜL (ref 0–0.1)
BASOPHILS NFR BLD AUTO: 0 % (ref 0–1)
BUN SERPL-MCNC: 12 MG/DL (ref 5–25)
CALCIUM SERPL-MCNC: 8.2 MG/DL (ref 8.3–10.1)
CHLORIDE SERPL-SCNC: 101 MMOL/L (ref 100–108)
CO2 SERPL-SCNC: 26 MMOL/L (ref 21–32)
CREAT SERPL-MCNC: 0.46 MG/DL (ref 0.6–1.3)
EOSINOPHIL # BLD AUTO: 0.02 THOUSAND/ΜL (ref 0–0.61)
EOSINOPHIL NFR BLD AUTO: 0 % (ref 0–6)
ERYTHROCYTE [DISTWIDTH] IN BLOOD BY AUTOMATED COUNT: 13.5 % (ref 11.6–15.1)
GFR SERPL CREATININE-BSD FRML MDRD: 88 ML/MIN/1.73SQ M
GLUCOSE SERPL-MCNC: 103 MG/DL (ref 65–140)
GLUCOSE SERPL-MCNC: 106 MG/DL (ref 65–140)
GLUCOSE SERPL-MCNC: 117 MG/DL (ref 65–140)
GLUCOSE SERPL-MCNC: 126 MG/DL (ref 65–140)
GLUCOSE SERPL-MCNC: 99 MG/DL (ref 65–140)
HCT VFR BLD AUTO: 36 % (ref 34.8–46.1)
HGB BLD-MCNC: 11.6 G/DL (ref 11.5–15.4)
IMM GRANULOCYTES # BLD AUTO: >0.5 THOUSAND/UL (ref 0–0.2)
IMM GRANULOCYTES NFR BLD AUTO: 2 % (ref 0–2)
L PNEUMO1 AG UR QL IA.RAPID: NEGATIVE
LYMPHOCYTES # BLD AUTO: 1.9 THOUSANDS/ΜL (ref 0.6–4.47)
LYMPHOCYTES NFR BLD AUTO: 7 % (ref 14–44)
MCH RBC QN AUTO: 30.1 PG (ref 26.8–34.3)
MCHC RBC AUTO-ENTMCNC: 32.2 G/DL (ref 31.4–37.4)
MCV RBC AUTO: 94 FL (ref 82–98)
MONOCYTES # BLD AUTO: 0.98 THOUSAND/ΜL (ref 0.17–1.22)
MONOCYTES NFR BLD AUTO: 3 % (ref 4–12)
NEUTROPHILS # BLD AUTO: 24.99 THOUSANDS/ΜL (ref 1.85–7.62)
NEUTS SEG NFR BLD AUTO: 88 % (ref 43–75)
NRBC BLD AUTO-RTO: 0 /100 WBCS
PLATELET # BLD AUTO: 259 THOUSANDS/UL (ref 149–390)
PMV BLD AUTO: 10.1 FL (ref 8.9–12.7)
POTASSIUM SERPL-SCNC: 3.9 MMOL/L (ref 3.5–5.3)
RBC # BLD AUTO: 3.85 MILLION/UL (ref 3.81–5.12)
S PNEUM AG UR QL: NEGATIVE
SODIUM SERPL-SCNC: 133 MMOL/L (ref 136–145)
WBC # BLD AUTO: 28.51 THOUSAND/UL (ref 4.31–10.16)

## 2018-11-02 PROCEDURE — 97163 PT EVAL HIGH COMPLEX 45 MIN: CPT

## 2018-11-02 PROCEDURE — 94668 MNPJ CHEST WALL SBSQ: CPT

## 2018-11-02 PROCEDURE — 97167 OT EVAL HIGH COMPLEX 60 MIN: CPT

## 2018-11-02 PROCEDURE — 85025 COMPLETE CBC W/AUTO DIFF WBC: CPT | Performed by: INTERNAL MEDICINE

## 2018-11-02 PROCEDURE — G8979 MOBILITY GOAL STATUS: HCPCS

## 2018-11-02 PROCEDURE — G8988 SELF CARE GOAL STATUS: HCPCS

## 2018-11-02 PROCEDURE — 94760 N-INVAS EAR/PLS OXIMETRY 1: CPT

## 2018-11-02 PROCEDURE — 99232 SBSQ HOSP IP/OBS MODERATE 35: CPT | Performed by: NURSE PRACTITIONER

## 2018-11-02 PROCEDURE — G8978 MOBILITY CURRENT STATUS: HCPCS

## 2018-11-02 PROCEDURE — 92610 EVALUATE SWALLOWING FUNCTION: CPT

## 2018-11-02 PROCEDURE — 86738 MYCOPLASMA ANTIBODY: CPT | Performed by: INTERNAL MEDICINE

## 2018-11-02 PROCEDURE — G8987 SELF CARE CURRENT STATUS: HCPCS

## 2018-11-02 PROCEDURE — 94640 AIRWAY INHALATION TREATMENT: CPT

## 2018-11-02 PROCEDURE — 92526 ORAL FUNCTION THERAPY: CPT

## 2018-11-02 PROCEDURE — 87449 NOS EACH ORGANISM AG IA: CPT | Performed by: INTERNAL MEDICINE

## 2018-11-02 PROCEDURE — 80048 BASIC METABOLIC PNL TOTAL CA: CPT | Performed by: INTERNAL MEDICINE

## 2018-11-02 PROCEDURE — 82948 REAGENT STRIP/BLOOD GLUCOSE: CPT

## 2018-11-02 RX ORDER — SODIUM CHLORIDE 9 MG/ML
75 INJECTION, SOLUTION INTRAVENOUS CONTINUOUS
Status: DISCONTINUED | OUTPATIENT
Start: 2018-11-02 | End: 2018-11-03

## 2018-11-02 RX ADMIN — LEVALBUTEROL HYDROCHLORIDE 1.25 MG: 1.25 SOLUTION, CONCENTRATE RESPIRATORY (INHALATION) at 19:44

## 2018-11-02 RX ADMIN — ACETAMINOPHEN 650 MG: 325 TABLET, FILM COATED ORAL at 11:57

## 2018-11-02 RX ADMIN — ISODIUM CHLORIDE 3 ML: 0.03 SOLUTION RESPIRATORY (INHALATION) at 07:45

## 2018-11-02 RX ADMIN — INSULIN GLARGINE 5 UNITS: 100 INJECTION, SOLUTION SUBCUTANEOUS at 21:53

## 2018-11-02 RX ADMIN — GABAPENTIN 300 MG: 300 CAPSULE ORAL at 09:54

## 2018-11-02 RX ADMIN — AZITHROMYCIN MONOHYDRATE 500 MG: 500 INJECTION, POWDER, LYOPHILIZED, FOR SOLUTION INTRAVENOUS at 11:02

## 2018-11-02 RX ADMIN — ISODIUM CHLORIDE 3 ML: 0.03 SOLUTION RESPIRATORY (INHALATION) at 13:21

## 2018-11-02 RX ADMIN — LEVALBUTEROL HYDROCHLORIDE 1.25 MG: 1.25 SOLUTION, CONCENTRATE RESPIRATORY (INHALATION) at 07:43

## 2018-11-02 RX ADMIN — DOCUSATE SODIUM 100 MG: 100 CAPSULE, LIQUID FILLED ORAL at 17:06

## 2018-11-02 RX ADMIN — GUAIFENESIN 1200 MG: 600 TABLET, EXTENDED RELEASE ORAL at 21:53

## 2018-11-02 RX ADMIN — GUAIFENESIN 1200 MG: 600 TABLET, EXTENDED RELEASE ORAL at 09:54

## 2018-11-02 RX ADMIN — SODIUM CHLORIDE 100 ML/HR: 0.9 INJECTION, SOLUTION INTRAVENOUS at 08:06

## 2018-11-02 RX ADMIN — ISODIUM CHLORIDE 3 ML: 0.03 SOLUTION RESPIRATORY (INHALATION) at 19:45

## 2018-11-02 RX ADMIN — MONTELUKAST SODIUM 10 MG: 10 TABLET, FILM COATED ORAL at 21:54

## 2018-11-02 RX ADMIN — CEFTRIAXONE 1000 MG: 1 INJECTION, POWDER, FOR SOLUTION INTRAMUSCULAR; INTRAVENOUS at 10:06

## 2018-11-02 RX ADMIN — DULOXETINE HYDROCHLORIDE 20 MG: 20 CAPSULE, DELAYED RELEASE ORAL at 09:54

## 2018-11-02 RX ADMIN — LEVALBUTEROL HYDROCHLORIDE 1.25 MG: 1.25 SOLUTION, CONCENTRATE RESPIRATORY (INHALATION) at 13:21

## 2018-11-02 RX ADMIN — ATORVASTATIN CALCIUM 20 MG: 80 TABLET, FILM COATED ORAL at 09:54

## 2018-11-02 RX ADMIN — SODIUM CHLORIDE 75 ML/HR: 0.9 INJECTION, SOLUTION INTRAVENOUS at 14:16

## 2018-11-02 RX ADMIN — DOCUSATE SODIUM 100 MG: 100 CAPSULE, LIQUID FILLED ORAL at 09:54

## 2018-11-02 RX ADMIN — ENOXAPARIN SODIUM 40 MG: 40 INJECTION SUBCUTANEOUS at 09:54

## 2018-11-02 NOTE — SPEECH THERAPY NOTE
Speech-Language Pathology Bedside Swallow Evaluation      Patient Name: Megan Roche    Today's Date: 11/2/2018     Problem List  Patient Active Problem List   Diagnosis    Left upper lobe pneumonia ([facility] [address] )    Type 2 diabetes mellitus without complication, without long-term current use of insulin ([facility] [address] )    Coronary artery disease of native artery of native heart with stable angina pectoris ([facility] [address] )    Intermittent asthma    Abdominal aortic aneurysm (AAA) 3 0 cm to 5 0 cm in diameter in female ([facility] [address] )    Hypothyroidism    HLD (hyperlipidemia)    Toxic metabolic encephalopathy       Past Medical History  Past Medical History:   Diagnosis Date    Bell's palsy remote    Coronary artery disease     Diabetes mellitus ([facility] [address] )     Disease of thyroid gland     GERD (gastroesophageal reflux disease)     Hyperlipidemia     Hypertension     Psychiatric disorder     Stroke [facility])        Past Surgical History  History reviewed  No pertinent surgical history  Summary   Pt is an 79 yo F c no h/o dysphagia who is admitted AMS-->Pna & encephalopathy  She presented with s/s suggestive of oral and pharyngeal dysphagia (see below for specifics) c risk for poor intake and aspiration  She would benefit from modified diet (pureed & honey thick liquid) and medications crushed/in puree  Prognosis for upgraded diet is good with medical mgmt and improvement in MS  Recommendations: puree/level 1 diet and honey thick liquids     Recommended Form of Meds: crushed with puree     Aspiration precautions and compensatory swallowing strategies: upright posture, only feed when fully alert, slow rate of feeding and small bites/sips          Current Medical Status  Megan Roche is a 80 y o  female who presents with lethargy and confusion  The majority history gathering was gathered from the patient's daughter was at the bedside    The patient is visiting her daughter from [place], she has been here since Tuesday has been developing progressive lethargy and fatigue  Her daughter also notes a cough  The patient is somnolent but arousable provides very little history  After her daughter brought her from [place] to her home here she has been spending the majority of time in bed  Appetite has been poor  Denies any fever, chills  Denies any shortness of breath  Denies chest pain  DX:  L UL pna with sepsis, toxic metabolic encephalopathy, CAD c stable angina, 3-5cm AAA, DM Type 2  Pt was held NPO pending Swallowing Evaluation by SLP  Evaluation completed bedside (9:20-9:50)  F/U education completed c caregivers (9:55-10:10)    Past medical history:  Please see H&P for details      Special Studies:  CXR 11/1: Patchy densities in the left perihilar and right lower lung field may represent developing pna  CT of c/a/p:   1  Emphysema and pulmonary fibrosis  2   Patchy opacities, most likely pneumonia, in the left upper lobe and lingula  3   Dilated central pulmonary arteries, consistent with pulmonary arterial hypertension  4   Nonspecific mild mediastinal lymphadenopathy  5   5 cm infrarenal abdominal aortic aneurysm  6   No evidence of acute abnormality in the abdomen or pelvis  Social/Education/Vocational Hx:  Pt lives in [place]  Pt resides in on 1st floor of a home in [place] (dtr Xena Rodríguez resides upstairs c her )  Dtr Jud Lara lives here in [place] & pt recently came to visit c dtr  Swallow Information   Current Risks for Dysphagia & Aspiration: AMS and decreased alertness    Current Diet: CCD 4gm NA ordered but held 2* AMS/ID of risk for dysphagia     Baseline Diet: regular diet and thin liquids      Baseline Assessment   Behavior/Cognition: lethargic, waxing and waning arousal level and decreased attention    Speech/Language Status: not able to to follow commands and limited verbal output  Intelligibility varied (from intellible automatic utterances to poorly intelligible volitional speech)    ??2* encephalopathy    Patient Positioning: upright in bed    Pain Status/Interventions/Response to Interventions: No report of or nonverbal indications of pain  Swallow Mechanism Exam   Unable to follow commands to allow for detailed evaluation of oral-motor structures and function (other than no teeth, no gross lingual asymmetry)  ?Mild R facial weakness at rest     Consistencies Assessed and Performance   Consistencies Administered: nectar thick, honey thick and puree  Specific materials administered included applesauce, thick clear liquid    Oral Stage: Impaired  Inconsistent oral opening  Bolus formation and transfer were functional with no significant oral residue noted  ?reduced oral control c nectar thick liquid (can't r/o posterior spillage)  Attempted to chew pill, then marlene stasis of remaining pill/capsule on tongue (eventually removed)    Pharyngeal Stage: suspected mild-moderate impaired at this time    Swallow Mechanics:  Swallowing initiation appeared prompt to mildly delayed  Laryngeal rise was palpated and judged to be within functional limits  No coughing, throat clearing, change in vocal quality or respiratory status noted with puree or honey thick liquid  Inconsistent cough c controlled sips of nectar thick liquid    Esophageal Concerns: none reported    Strategies and Efficacy: "Alerters", stimulation and direct instruction maximized oral performance but did not eliminate all sxs/risks      Summary and Recommendations (see above)      Results Reviewed with: RN and family     Treatment Recommended: yes    Frequency of treatment: 3-4x weekly    Patient Stated Goal: unable to state at this time    Dysphagia Goals per SLP:   1  pt will tolerate puree then mech soft diet with thick then thinner liquids without s/s of aspiration x24 hours with each upgrade in diet  2   Caregivers will demo application of (traiend) safe feeding techniques  3  Pt will use trained strategies c min cues (once MS improved)      Pt/Family Education: initiated  Pt and caregivers would benefit from/require continued education

## 2018-11-02 NOTE — UTILIZATION REVIEW
Initial Clinical Review    Admission: Date/Time/Statement: 11/1/18 @ 1728     Orders Placed This Encounter   Procedures    Inpatient Admission (expected length of stay for this patient is greater than two midnights)     Standing Status:   Standing     Number of Occurrences:   1     Order Specific Question:   Admitting Physician     Answer:   Nurys Mas [[id number]]     Order Specific Question:   Level of Care     Answer:   Med Surg [[id number]]     Order Specific Question:   Estimated length of stay     Answer:   More than 2 Midnights     Order Specific Question:   Certification     Answer:   I certify that inpatient services are medically necessary for this patient for a duration of greater than two midnights  See H&P and MD Progress Notes for additional information about the patient's course of treatment  ED: Date/Time/Mode of Arrival:   ED Arrival Information     Expected Arrival Acuity Means of Arrival Escorted By Service Admission Type    - 11/1/2018 12:15 Urgent Ambulance [facility] Hospitalist Urgent    Arrival Complaint    uti          Chief Complaint:   Patient presents with    Lethargy     patient visiting from [place] for the week  Patient daughter states she has had a cough for a few days  Patient arrives to ER with foul smelling urine  History of Illness: Candido Storey is a 80 y o  female who presents with lethargy and confusion  The patient is visiting her daughter from [place], she has been here since Tuesday has been developing progressive lethargy and fatigue    Her daughter also notes a cough          ED Triage Vitals [11/01/18 1228]   100 5 °F (38 1 °C) (!) 113 19 165/80 (!) 88 %      No Pain       11/01/18 57 3 kg (126 lb 5 2 oz)       Vital Signs (abnormal):   Date/Time  Temp  Pulse  Resp  BP  SpO2  O2 Device   11/02/18 1111   101 °F (38 3 °C)   114  16  154/85  93 %  Nasal cannula   11/02/18 0749  97 5 °F (36 4 °C)   107  16  160/67  100 %  Nasal cannula 11/02/18 0745  --  --  --  --   86 %  None (Room air)   11/01/18 2313  99 6 °F (37 6 °C)   114  16  163/73  93 %  Nasal cannula   11/01/18 2018  100 4 °F (38 °C)   108  18  155/71  95 %  Nasal cannula   11/01/18 1925  --  104  20  141/63  94 %  Nasal cannula   11/01/18 1615  --   116  22  157/67  94 %  --   11/01/18 1525  --   120  18  169/79  94 %  Nasal cannula   11/01/18 1445  --   112  17  169/79  99 %  None (Room air)   11/01/18 1400  --   110   26  149/86  97 %  Nasal cannula   11/01/18 1245  --   110   25  167/71  97 %  Nasal cannula     2L  O2 NC        Abnormal Labs/Diagnostic Test Results:    Wbc 28 51      Procalcitonin 1 29   GREATER than 0 5 ng/mL:   increased likelihood for bacterial sepsis; antibiotics ENCOURAGED  Ct  Chest abdomen and pelvis   1  Emphysema and pulmonary fibrosis  2   Patchy opacities, most likely pneumonia, in the left upper lobe and lingula  3   Dilated central pulmonary arteries, consistent with pulmonary arterial hypertension  4   Nonspecific mild mediastinal lymphadenopathy  5   5 cm infrarenal abdominal aortic aneurysm          ED Treatment:   Medication Administration from 11/01/2018 1215 to 11/01/2018 2005       Date/Time Order Dose Route     11/01/2018 1340 sodium chloride 0 9 % bolus 2,000 mL 2,000 mL Intravenous     11/01/2018 1448 vancomycin (VANCOCIN) IVPB (premix) 750 mg 750 mg Intravenous     11/01/2018 1340 cefepime (MAXIPIME) 2 g/50 mL dextrose IVPB 2,000 mg Intravenous     11/01/2018 1420 albuterol inhalation solution 5 mg 5 mg Nebulization     11/01/2018 1420 ipratropium (ATROVENT) 0 02 % inhalation solution 0 5 mg 0 5 mg Nebulization     11/01/2018 1608 acetaminophen (TYLENOL) tablet 650 mg 650 mg Oral       Past Medical/Surgical History:        Bell's palsy    Coronary artery disease    Diabetes mellitus ([address] )    Disease of thyroid gland    GERD (gastroesophageal reflux disease)    Hyperlipidemia    Hypertension    Psychiatric disorder    Stroke [facility])       Admitting Diagnosis: Pneumonia [J18 9]  Lethargy [R53 83]  Sepsis ([facility] [address] ) [A41 9]    Age/Sex: 80 y o  female  Requires oxygen and iv antibiotics     Assessment/Plan:     Left upper lobe pneumonia ([facility] [address] )   Assessment & Plan     With sepsis  Community-acquired  Admit to inpatient  Start ceftriaxone and azithromycin  Await blood and sputum cultures  Await pneumonia antigens  Monitor temps, procalcitonin, and WBC count  Supplemental oxygen  Airway clearance protocol  P r n  Bronchodilators  Mucinex      Toxic metabolic encephalopathy   Assessment & Plan     Due to sepsis         Admission Orders:    Aspiration precautions  PT and OT eval and treat  2L O2 NC  Ambulatory pulse oximetry on room air  Telemetry   Oscillatory positive expiratory pressure  Legionella  Strep pneumoniae  Mycoplasma pneumoniae AB, IgG/gM  Sputum culture        Scheduled Meds:     acetaminophen 650 mg Oral Once   acetaminophen 650 mg Oral Q6H PRN   albuterol 2 5 mg Nebulization Q4H PRN   atorvastatin 20 mg Oral Daily   cefTRIAXone 1,000 mg Intravenous Q24H   And      azithromycin 500 mg Intravenous Q24H   docusate sodium 100 mg Oral BID   DULoxetine 20 mg Oral Daily   enoxaparin 40 mg Subcutaneous Daily   gabapentin 300 mg Oral Daily   guaiFENesin 1,200 mg Oral Q12H CHERYL   insulin glargine 5 Units Subcutaneous HS   insulin lispro 1-5 Units Subcutaneous TID AC   levalbuterol 1 25 mg Nebulization TID   levothyroxine 112 mcg Oral Daily   LORazepam 0 5 mg Oral Q8H PRN   montelukast 10 mg Oral HS   pantoprazole 20 mg Oral Early Morning   pneumococcal 13-valent conjugate vaccine 0 5 mL Intramuscular Prior to discharge   sodium chloride 3 mL Nebulization TID

## 2018-11-02 NOTE — PROGRESS NOTES
Progress Note - Simon Stokes 10/31/1929, 80 y o  female MRN: [id number]    Unit/Bed#: [facility] 725-01 Encounter: [id number]    Primary Care Provider: No primary care provider on file  Date and time admitted to hospital: 11/1/2018 12:15 PM        * Left upper lobe pneumonia [facility])   Assessment & Plan    With sepsis  Community-acquired  Admit to inpatient  Start ceftriaxone and azithromycin, will continue for another 24 hrs due to lack of response at this time  If pt with no improvement by tomorrow would look to possibly broaden treatment   Await blood in process and sputum culture not yet obtained  Await pneumonia antigens  Monitor temps, procalcitonin 1 29, repeat in a m  , and WBC count, elevated at 28 51  Supplemental oxygen 2 L nasal cannula  Airway clearance protocol  P r n  Bronchodilators  Mucinex     Toxic metabolic encephalopathy   Assessment & Plan    Due to sepsis, POA   Treatment as above     Abdominal aortic aneurysm (AAA) 3 0 cm to 5 0 cm in diameter in female [facility])   Assessment & Plan    Incidentally found on CT scan on admission   Will need vascular surgery follow-up as an outpatient  CT:  5 cm infrarenal abdominal aortic aneurysm  Intermittent asthma   Assessment & Plan    Appears stable at this time  Continue Singulair and bronchodilators, since pt with pneumonia      HLD (hyperlipidemia)   Assessment & Plan    Continue statin     Hypothyroidism   Assessment & Plan    Continue levothyroxine     Coronary artery disease of native artery of native heart with stable angina pectoris [facility])   Assessment & Plan    Continue statin  P r n  Nitroglycerin  At this time due to encephalopathy unable to gather further information regarding cardiac disease       Type 2 diabetes mellitus without complication, without long-term current use of insulin [facility])   Assessment & Plan    No results found for: HGBA1C    Recent Labs      11/01/18   2107  11/02/18   0626  11/02/18   1113   POCGLU  123  117  126 Blood Sugar Average: Last 72 hrs:  (P) 122   Hold metformin while hospitalized  started basal Lantus, can discontinue upon discharge when restart metformin  Sliding scale coverage  Carb controlled diet  VTE Pharmacologic Prophylaxis:   Pharmacologic: Enoxaparin (Lovenox)  Mechanical VTE Prophylaxis in Place: Yes    Patient Centered Rounds: I have performed bedside rounds with nursing staff today  Discussions with Specialists or Other Care Team Provider: nursing     Education and Discussions with Family / Patient: patient and daughter at bedside    Time Spent for Care: 30 minutes  More than 50% of total time spent on counseling and coordination of care as described above  Current Length of Stay: 1 day(s)    Current Patient Status: Inpatient   Certification Statement: The patient will continue to require additional inpatient hospital stay due to pt remains febrile on iv abx treatment     Discharge Plan: pending pt ot will need rehab most likely     Code Status: Level 3 - DNAR and DNI      Subjective:   Patient mumbling answers  Currently afebrile on exam, the nursing placing ice packs a some mild tachycardia  No nausea vomiting or diarrhea  No pain  Objective:     Vitals:   Temp (24hrs), Av 5 °F (37 5 °C), Min:97 5 °F (36 4 °C), Max:101 °F (38 3 °C)    Temp:  [97 5 °F (36 4 °C)-101 °F (38 3 °C)] 99 °F (37 2 °C)  HR:  [102-120] 102  Resp:  [16-22] 16  BP: (141-169)/(63-85) 154/85  SpO2:  [86 %-100 %] 92 %  Body mass index is 23 1 kg/m²  Input and Output Summary (last 24 hours): Intake/Output Summary (Last 24 hours) at 18 1410  Last data filed at 18 1202   Gross per 24 hour   Intake             3590 ml   Output              630 ml   Net             2960 ml       Physical Exam:     Physical Exam   Constitutional: No distress  HENT:   Head: Normocephalic and atraumatic  Eyes: Conjunctivae are normal  Right eye exhibits no discharge   Left eye exhibits no discharge  No scleral icterus  Neck: No JVD present  No tracheal deviation present  No thyromegaly present  Cardiovascular: Normal rate  Exam reveals no gallop and no friction rub  No murmur heard  Pulmonary/Chest: No stridor  No respiratory distress  She has no wheezes  She has rales (Rhonchi throughout/ moist weak cough)  She exhibits no tenderness  Abdominal: She exhibits no distension and no mass  There is no tenderness  There is no rebound and no guarding  Musculoskeletal: She exhibits no edema, tenderness or deformity  Lymphadenopathy:     She has no cervical adenopathy  Neurological: She is alert  Mumbles responses / facial droop (chronic) hx of cva per daughter   Skin: No rash noted  She is not diaphoretic  No erythema  No pallor  Psychiatric:   Lethargic          Additional Data:     Labs:      Results from last 7 days  Lab Units 11/02/18  0656 11/01/18  1233   WBC Thousand/uL 28 51* 29 82*   HEMOGLOBIN g/dL 11 6 12 6   HEMATOCRIT % 36 0 40 1   PLATELETS Thousands/uL 259 300   BANDS PCT %  --  6   NEUTROS PCT % 88*  --    LYMPHS PCT % 7*  --    LYMPHO PCT %  --  3*   MONOS PCT % 3*  --    MONO PCT %  --  4   EOS PCT % 0 0       Results from last 7 days  Lab Units 11/02/18  0656 11/01/18  1233   POTASSIUM mmol/L 3 9 4 0   CHLORIDE mmol/L 101 101   CO2 mmol/L 26 29   BUN mg/dL 12 19   CREATININE mg/dL 0 46* 0 62   ANION GAP mmol/L 6 4   CALCIUM mg/dL 8 2* 8 4   ALBUMIN g/dL  --  2 9*   TOTAL BILIRUBIN mg/dL  --  0 46   ALK PHOS U/L  --  93   ALT U/L  --  11*   AST U/L  --  15       Results from last 7 days  Lab Units 11/01/18  1233   INR  1 01       Results from last 7 days  Lab Units 11/02/18  1113 11/02/18  0626 11/01/18  2107   POC GLUCOSE mg/dl 126 117 123           Results from last 7 days  Lab Units 11/01/18  2206 11/01/18  1233   LACTIC ACID mmol/L  --  1 5   PROCALCITONIN ng/ml 1 29*  --            * I Have Reviewed All Lab Data Listed Above    * Additional Pertinent Lab Tests Reviewed: No New Labs Available For Today    Imaging:    Imaging Reports Reviewed Today Include: reviewed       Recent Cultures (last 7 days):       Results from last 7 days  Lab Units 11/02/18  0834   LEGIONELLA URINARY ANTIGEN  Negative       Last 24 Hours Medication List:     Current Facility-Administered Medications:  acetaminophen 650 mg Oral Once Lanbess Gibson MD    acetaminophen 650 mg Oral Q6H PRN Sunday Hillister, MD    albuterol 2 5 mg Nebulization Q4H PRN Mildred Gottron, MD    atorvastatin 20 mg Oral Daily Sunday Burnet, MD    cefTRIAXone 1,000 mg Intravenous Q24H Sunday MD Mckayla Last Rate: Stopped (11/02/18 1036)   And        azithromycin 500 mg Intravenous Q24H Sunday MD Mckayla Last Rate: Stopped (11/02/18 1202)   docusate sodium 100 mg Oral BID Sunday Hillister, MD    DULoxetine 20 mg Oral Daily Carroll Hillister, MD    enoxaparin 40 mg Subcutaneous Daily Sunday Burnet, MD    gabapentin 300 mg Oral Daily Sunday Burnet, MD    guaiFENesin 1,200 mg Oral Q12H [facility], MD    insulin glargine 5 Units Subcutaneous HS Sunday Burnet, MD    insulin lispro 1-5 Units Subcutaneous TID AC Sunday Hillister, MD    levalbuterol 1 25 mg Nebulization TID Mildred Gottron, MD    levothyroxine 112 mcg Oral Daily Sunday Burnet, MD    LORazepam 0 5 mg Oral Q8H PRN Sunday Hillister, MD    montelukast 10 mg Oral HS Carroll Hillister, MD    pantoprazole 20 mg Oral Early Morning Sunday Hillister, MD    pneumococcal 13-valent conjugate vaccine 0 5 mL Intramuscular Prior to discharge Mildred Gottron, MD    sodium chloride 75 mL/hr Intravenous Continuous AMERICA Koch    sodium chloride 3 mL Nebulization TID Mildred Gottron, MD         Today, Patient Was Seen By: AMERICA Koch    ** Please Note: Dictation voice to text software may have been used in the creation of this document   **

## 2018-11-02 NOTE — ASSESSMENT & PLAN NOTE
Continue statin  P r n  Nitroglycerin  At this time due to encephalopathy unable to gather further information regarding cardiac disease

## 2018-11-02 NOTE — RESTORATIVE TECHNICIAN NOTE
Restorative Specialist Mobility Note       Activity: Ambulate in room, Chair, Dangle, Stand at bedside (Educated/encouraged pt to ambulate with assistance 3-4 x's/day  Chair alarm on   Pt callbell, phone/tray within reach )     Assistive Device: Other (Comment) (HHA x2, Assited OT/PT)       Vilma Peña BS, Restorative Technician,

## 2018-11-02 NOTE — PLAN OF CARE
CARDIOVASCULAR - ADULT     Maintains optimal cardiac output and hemodynamic stability Progressing     Absence of cardiac dysrhythmias or at baseline rhythm Progressing        Nutrition/Hydration-ADULT     Nutrient/Hydration intake appropriate for improving, restoring or maintaining nutritional needs Progressing        Potential for Falls     Patient will remain free of falls Progressing        Prexisting or High Potential for Compromised Skin Integrity     Skin integrity is maintained or improved Progressing        RESPIRATORY - ADULT     Achieves optimal ventilation and oxygenation Progressing

## 2018-11-02 NOTE — PLAN OF CARE
Problem: OCCUPATIONAL THERAPY ADULT  Goal: Performs self-care activities at highest level of function for planned discharge setting  See evaluation for individualized goals  Treatment Interventions: ADL retraining, Functional transfer training, UE strengthening/ROM, Endurance training, Cognitive reorientation, Patient/family training, Compensatory technique education, Continued evaluation, Activityengagement  Equipment Recommended: Bedside commode, Tub seat with back       See flowsheet documentation for full assessment, interventions and recommendations  Limitation: Decreased ADL status, Decreased UE strength, Decreased Safe judgement during ADL, Decreased cognition, Decreased endurance, Visual deficit, Decreased fine motor control, Decreased high-level ADLs, Decreased self-care trans  Prognosis: Fair  Assessment: Pt is a 80 y o  female who was admitted to [facility] on 11/1/2018 with, poor appetite, fatigue, cough, and Left upper lobe pneumonia (HCC) toxic metabolic encepalopathy , CT scan +Aortic abdominal aneurysm   Pt's problem list also includes PMH of  Asthma, CAD, diabetes, thyroid gland disease, hyperlipidemia, HTN, psychiatric disorder, CVA   At baseline pt was completing ADl's and mobility with S and RW, no bathroom DME, enjoys talking about [employer]  Pt lives with daughter in [place] (was visiting other daughter in area) [facility] with 5 URSULA, first floor set-up, +RW, cane  Currently pt requires max/total assist for overall ADLS and assist x 2 for stand pivot transfers   Pt currently presents with impairments in the following categories -steps to enter environment, difficulty performing ADLS, difficulty performing IADLS , limited insight into deficits, decreased initiation and engagement  and environment activity tolerance, endurance, standing balance/tolerance, sitting balance/tolerance, UE strength, arousal, memory, insight, safety , judgement , attention , sequencing , task initiation  and task termination   These impairments, as well as pt's fatigue, cognition  limit pt's ability to safely engage in all baseline areas of occupation, includingeating, grooming, bathing, dressing, toileting, functional mobility/transfers and community mobility From OT standpoint, recommend skilled inpatient rehab upon D/C  OT will continue to follow to address the below stated goals        OT Discharge Recommendation: Short Term Rehab  OT - OK to Discharge:  (yes to short term rehab)

## 2018-11-02 NOTE — PHYSICAL THERAPY NOTE
Physical Therapy Evaluation     11/02/18 1048   Note Type   Note type Eval only   Pain Assessment   Pain Assessment No/denies pain   Pain Score No Pain   Home Living   Type of [address] (lives on 1st floor; 5STE; daughter lives upstairs)   Home Equipment Walker;Cane   Prior Function   Level of [place] Needs assistance with IADLs; Needs assistance with ADLs and functional mobility   Lives With Daughter  (lives upstairs and helps pt prn)   Receives Help From Family  (daughter and granddaughter)   ADL Assistance Needs assistance   IADLs Needs assistance   Falls in the last 6 months 1 to 4   Vocational Retired   Restrictions/Precautions   [facility] Bearing Precautions Per Order No   Other Precautions Cognitive; Chair Alarm; Bed Alarm;Multiple lines;Telemetry;O2;Fall Risk;Aspiration   Cognition   Overall Cognitive Status Impaired   Arousal/Participation Poorly responsive   Attention Difficulty attending to directions   Orientation Level Oriented to person  (first name only)   Memory Decreased recall of biographical information;Decreased short term memory;Decreased recall of recent events   Following Commands Unable to follow one step commands   RLE Assessment   RLE Assessment (grossly 3/5; assessed during transfer)   LLE Assessment   LLE Assessment (grossly 3/5; assessed during transfer)   Coordination   Movements are Fluid and Coordinated 0   Coordination and Movement Description pt unable to coordinate movements despite verbal and tactile cuing   Bed Mobility   Supine to Sit 2  Maximal assistance   Additional items Assist x 2;HOB elevated; Increased time required;Verbal cues;LE management   Transfers   Sit to Stand 2  Maximal assistance   Additional items Assist x 2;Verbal cues; Increased time required   Stand to Sit 3  Moderate assistance   Additional items Assist x 2   Stand pivot 3  Moderate assistance   Additional items Assist x 2  (bed to chair)   Balance   Static Sitting Poor +   Dynamic Sitting Poor -   Static Standing Poor -   Dynamic Standing Poor -   Ambulatory Zero   Endurance Deficit   Endurance Deficit Yes   Endurance Deficit Description secondary to weakness and fatigue   Activity Tolerance   Activity Tolerance Patient limited by fatigue;Patient limited by pain   Nurse Made Aware yes Clint Pace RN   Assessment   Prognosis Fair   Problem List Decreased strength;Decreased endurance; Impaired balance;Decreased mobility; Decreased coordination;Decreased cognition; Impaired judgement; Impaired vision; Impaired hearing;Pain   Assessment Pt is an 81yo female admitted to hospitals on 11/1/18 with lethargy and diagnosis of UTI and L upper lobe pneumonia  Pt's PMH includes intermittent asthma, CAD with stable angina, AAA, HLD, DM2 and hypothyroidism  Pt was unable to answer subjective questions during evaluation, so daughter provided information regarding home environment and PLOF  Pt was lethargic during session and had trouble concentrating/participating  Pt was able to transfer from bed to chair with mod to max Ax2  When pt attempted to stand following transfer, she was unable to achieve a fully upright position and needed to return to sitting immediately  Pt's daughter expressed concern with having pt return to home under current conditions  PT spoke to case management regarding daughter's concern and CM will be speaking with her when able  Pt is high complexity due to current symptoms, PMH and inability to care for herself  PT will follow pt to work on bed mobility, transfers, ambulation and step mobility to allow for safe return to home when able  PT recommends pt be D/C to IP rehabilitation when medically stable     Barriers to Discharge Inaccessible home environment;Decreased caregiver support  (daughter states that her sister is becoming overwhelmed)   Barriers to Discharge Comments pt lives with daughter and granddaughter but, per family, has had a recent decline in health; family feels that they need help to provide proper care - PT informed case management of situation for follow up   Goals   Patient Goals unable to obtain; family goal: get more help in caring for pt   STG Expiration Date 11/16/18   Short Term Goal #1 pt will be I with bed mobility; pt will be min Ax1 for transfers; pt will ambulate at least 50ft with least restrictive device and min Ax1; pt will ambulate 5 steps with min Ax1; pt will improve static sitting balance to "fair"; pt will improve static standing balance to "poor+"   Treatment Day 0   Plan   Treatment/Interventions Functional transfer training;Elevations; Therapeutic exercise; Endurance training;Patient/family training;Bed mobility;Gait training   PT Frequency (3-5x/wk)   Recommendation   Recommendation (IP rehabilitation when medically stable)   Equipment Recommended Walker   PT - OK to Discharge Yes  (to IP rehabilitation when medically stable)   Modified Melanie Scale   Modified Melanie Scale 5   Barthel Index   Feeding 0   Bathing 0   Grooming Score 0   Dressing Score 0   Bladder Score 0   Bowels Score 0   Toilet Use Score 0   Transfers (Bed/Chair) Score 5   Mobility (Level Surface) Score 0   Stairs Score 0   Barthel Index Score 5     Neha Iqbal, SPT

## 2018-11-02 NOTE — ASSESSMENT & PLAN NOTE
Incidentally found on CT scan on admission   Will need vascular surgery follow-up as an outpatient  CT:  5 cm infrarenal abdominal aortic aneurysm

## 2018-11-02 NOTE — ASSESSMENT & PLAN NOTE
No results found for: HGBA1C    Recent Labs      11/01/18   2107 11/02/18   0626  11/02/18   1113   POCGLU  123  117  126       Blood Sugar Average: Last 72 hrs:  (P) 122   Hold metformin while hospitalized  started basal Lantus, can discontinue upon discharge when restart metformin  Sliding scale coverage  Carb controlled diet

## 2018-11-02 NOTE — ASSESSMENT & PLAN NOTE
With sepsis  Community-acquired  Admit to inpatient  Start ceftriaxone and azithromycin, will continue for another 24 hrs due to lack of response at this time  If pt with no improvement by tomorrow would look to possibly broaden treatment   Await blood in process and sputum culture not yet obtained  Await pneumonia antigens  Monitor temps, procalcitonin 1 29, repeat in a m  , and WBC count, elevated at 28 51  Supplemental oxygen 2 L nasal cannula  Airway clearance protocol  P r n  Bronchodilators    Mucinex

## 2018-11-02 NOTE — SOCIAL WORK
Met with pt and her dgt Raffy Sena and explained CM role  CM assessment completed with pt's dgt as pt was receiving a breathing treatment at time of assessment  Pt is visiting with her dgt Sobia Nuñez and staying with her dgt  Pt lives with her granddgt and granddgts boyfriend in a 1st floor apt with 6 URSULA  Pt's dgt, Ann Benson, lives upstairs in an apt in the same building  Pt has been requiring assistance with ADLs and ambulation and her dgt has been assisting her  Pt does not drive and her family or a Senior [employer] bus provides her with transportation  Pt's PCP is Dr René Feldman, contact # [phone]  Pt has a cane, walker, and nebulizer at home  No hx of HHC, but has a hx of rehab yrs ago in [place], [place]  Pt has a hx of anxiety, but denies an inpt BH stay  No hx of drug or alcohol use  Primary contact is pt's dgt Raffy Sena, contact # [phone], or pt's dgt Ambrosio Beverly, contact # [phone]  Alternate contact is pt's granddgt Nayely Osullivan, contact # [phone]  Pt's family will provide pt with transportation home upon discharge  Sobia Nuñez was unsure if pt has a POA, but stated that pt's son makes pt's medical decisions - Bam Levine (he is paralyzed and almost bed bound), contact # [phone]  Informed them of therapy's recommendation for inpt rehab  Sobia Nuñez stated that she discussed inpt rehab options with her sister and niece and they were agreeable and preferred referrals to [facility] in [place], [place] and [facility] in [place], [address] [place] [address] referrals sent for same

## 2018-11-02 NOTE — PLAN OF CARE
Problem: PHYSICAL THERAPY ADULT  Goal: Performs mobility at highest level of function for planned discharge setting  See evaluation for individualized goals  Treatment/Interventions: Functional transfer training, Elevations, Therapeutic exercise, Endurance training, Patient/family training, Bed mobility, Gait training  Equipment Recommended: Mallory Innocent       See flowsheet documentation for full assessment, interventions and recommendations  Prognosis: Fair  Problem List: Decreased strength, Decreased endurance, Impaired balance, Decreased mobility, Decreased coordination, Decreased cognition, Impaired judgement, Impaired vision, Impaired hearing, Pain  Assessment: Pt is an 79yo female admitted to [facility] on 11/1/18 with lethargy and diagnosis of UTI and L upper lobe pneumonia  Pt's PMH includes intermittent asthma, CAD with stable angina, AAA, HLD, DM2 and hypothyroidism  Pt was unable to answer subjective questions during evaluation, so daughter provided information regarding home environment and PLOF  Pt was lethargic during session and had trouble concentrating/participating  Pt was able to transfer from bed to chair with mod to max Ax2  When pt attempted to stand following transfer, she was unable to achieve a fully upright position and needed to return to sitting immediately  Pt's daughter expressed concern with having pt return to home under current conditions  PT spoke to case management regarding daughter's concern and CM will be speaking with her when able  Pt is high complexity due to current symptoms, PMH and inability to care for herself  PT will follow pt to work on bed mobility, transfers, ambulation and step mobility to allow for safe return to home when able  PT recommends pt be D/C to IP rehabilitation when medically stable    Barriers to Discharge: Inaccessible home environment, Decreased caregiver support (daughter states that her sister is becoming overwhelmed)  Barriers to Discharge Comments: pt lives with daughter and granddaughter but, per family, has had a recent decline in health; family feels that they need help to provide proper care - PT informed case management of situation for follow up  Recommendation:  (IP rehabilitation when medically stable)     PT - OK to Discharge: Yes (to IP rehabilitation when medically stable)    See flowsheet documentation for full assessment

## 2018-11-03 LAB
ALBUMIN SERPL BCP-MCNC: 1.6 G/DL (ref 3.5–5)
ALP SERPL-CCNC: 66 U/L (ref 46–116)
ALT SERPL W P-5'-P-CCNC: 10 U/L (ref 12–78)
AMMONIA PLAS-SCNC: 30 UMOL/L (ref 11–35)
ANION GAP SERPL CALCULATED.3IONS-SCNC: 10 MMOL/L (ref 4–13)
ANION GAP SERPL CALCULATED.3IONS-SCNC: 4 MMOL/L (ref 4–13)
AST SERPL W P-5'-P-CCNC: 10 U/L (ref 5–45)
BASOPHILS # BLD AUTO: 0.08 THOUSANDS/ΜL (ref 0–0.1)
BASOPHILS NFR BLD AUTO: 0 % (ref 0–1)
BILIRUB SERPL-MCNC: 0.31 MG/DL (ref 0.2–1)
BUN SERPL-MCNC: 14 MG/DL (ref 5–25)
BUN SERPL-MCNC: 7 MG/DL (ref 5–25)
CALCIUM SERPL-MCNC: 6.3 MG/DL (ref 8.3–10.1)
CALCIUM SERPL-MCNC: 8.4 MG/DL (ref 8.3–10.1)
CHLORIDE SERPL-SCNC: 102 MMOL/L (ref 100–108)
CHLORIDE SERPL-SCNC: 110 MMOL/L (ref 100–108)
CO2 SERPL-SCNC: 21 MMOL/L (ref 21–32)
CO2 SERPL-SCNC: 28 MMOL/L (ref 21–32)
CREAT SERPL-MCNC: 0.24 MG/DL (ref 0.6–1.3)
CREAT SERPL-MCNC: 0.54 MG/DL (ref 0.6–1.3)
EOSINOPHIL # BLD AUTO: 0.1 THOUSAND/ΜL (ref 0–0.61)
EOSINOPHIL NFR BLD AUTO: 1 % (ref 0–6)
ERYTHROCYTE [DISTWIDTH] IN BLOOD BY AUTOMATED COUNT: 13.5 % (ref 11.6–15.1)
GFR SERPL CREATININE-BSD FRML MDRD: 110 ML/MIN/1.73SQ M
GFR SERPL CREATININE-BSD FRML MDRD: 84 ML/MIN/1.73SQ M
GLUCOSE SERPL-MCNC: 109 MG/DL (ref 65–140)
GLUCOSE SERPL-MCNC: 133 MG/DL (ref 65–140)
GLUCOSE SERPL-MCNC: 160 MG/DL (ref 65–140)
GLUCOSE SERPL-MCNC: 85 MG/DL (ref 65–140)
GLUCOSE SERPL-MCNC: 95 MG/DL (ref 65–140)
GLUCOSE SERPL-MCNC: 97 MG/DL (ref 65–140)
HCT VFR BLD AUTO: 35.8 % (ref 34.8–46.1)
HGB BLD-MCNC: 11.4 G/DL (ref 11.5–15.4)
IMM GRANULOCYTES # BLD AUTO: 0.32 THOUSAND/UL (ref 0–0.2)
IMM GRANULOCYTES NFR BLD AUTO: 2 % (ref 0–2)
LYMPHOCYTES # BLD AUTO: 1.75 THOUSANDS/ΜL (ref 0.6–4.47)
LYMPHOCYTES NFR BLD AUTO: 8 % (ref 14–44)
MCH RBC QN AUTO: 29.8 PG (ref 26.8–34.3)
MCHC RBC AUTO-ENTMCNC: 31.8 G/DL (ref 31.4–37.4)
MCV RBC AUTO: 94 FL (ref 82–98)
MONOCYTES # BLD AUTO: 0.94 THOUSAND/ΜL (ref 0.17–1.22)
MONOCYTES NFR BLD AUTO: 4 % (ref 4–12)
NEUTROPHILS # BLD AUTO: 18.45 THOUSANDS/ΜL (ref 1.85–7.62)
NEUTS SEG NFR BLD AUTO: 85 % (ref 43–75)
NRBC BLD AUTO-RTO: 0 /100 WBCS
PLATELET # BLD AUTO: 276 THOUSANDS/UL (ref 149–390)
PMV BLD AUTO: 10.5 FL (ref 8.9–12.7)
POTASSIUM SERPL-SCNC: 2.4 MMOL/L (ref 3.5–5.3)
POTASSIUM SERPL-SCNC: 3.9 MMOL/L (ref 3.5–5.3)
PROCALCITONIN SERPL-MCNC: 0.88 NG/ML
PROT SERPL-MCNC: 4.5 G/DL (ref 6.4–8.2)
RBC # BLD AUTO: 3.82 MILLION/UL (ref 3.81–5.12)
SODIUM SERPL-SCNC: 134 MMOL/L (ref 136–145)
SODIUM SERPL-SCNC: 141 MMOL/L (ref 136–145)
T4 FREE SERPL-MCNC: 1.43 NG/DL (ref 0.76–1.46)
TSH SERPL DL<=0.05 MIU/L-ACNC: 0.11 UIU/ML (ref 0.36–3.74)
WBC # BLD AUTO: 21.64 THOUSAND/UL (ref 4.31–10.16)

## 2018-11-03 PROCEDURE — 87081 CULTURE SCREEN ONLY: CPT | Performed by: NURSE PRACTITIONER

## 2018-11-03 PROCEDURE — 80053 COMPREHEN METABOLIC PANEL: CPT | Performed by: NURSE PRACTITIONER

## 2018-11-03 PROCEDURE — 85025 COMPLETE CBC W/AUTO DIFF WBC: CPT | Performed by: NURSE PRACTITIONER

## 2018-11-03 PROCEDURE — 94760 N-INVAS EAR/PLS OXIMETRY 1: CPT

## 2018-11-03 PROCEDURE — 82140 ASSAY OF AMMONIA: CPT | Performed by: NURSE PRACTITIONER

## 2018-11-03 PROCEDURE — 99232 SBSQ HOSP IP/OBS MODERATE 35: CPT | Performed by: NURSE PRACTITIONER

## 2018-11-03 PROCEDURE — 82948 REAGENT STRIP/BLOOD GLUCOSE: CPT

## 2018-11-03 PROCEDURE — 84443 ASSAY THYROID STIM HORMONE: CPT | Performed by: NURSE PRACTITIONER

## 2018-11-03 PROCEDURE — 94668 MNPJ CHEST WALL SBSQ: CPT

## 2018-11-03 PROCEDURE — 94669 MECHANICAL CHEST WALL OSCILL: CPT

## 2018-11-03 PROCEDURE — 84145 PROCALCITONIN (PCT): CPT | Performed by: NURSE PRACTITIONER

## 2018-11-03 PROCEDURE — 84439 ASSAY OF FREE THYROXINE: CPT | Performed by: NURSE PRACTITIONER

## 2018-11-03 PROCEDURE — 80048 BASIC METABOLIC PNL TOTAL CA: CPT | Performed by: NURSE PRACTITIONER

## 2018-11-03 PROCEDURE — 94640 AIRWAY INHALATION TREATMENT: CPT

## 2018-11-03 RX ORDER — POTASSIUM CHLORIDE 20 MEQ/1
40 TABLET, EXTENDED RELEASE ORAL ONCE
Status: COMPLETED | OUTPATIENT
Start: 2018-11-03 | End: 2018-11-03

## 2018-11-03 RX ORDER — POTASSIUM CHLORIDE 14.9 MG/ML
20 INJECTION INTRAVENOUS 2 TIMES DAILY
Status: DISCONTINUED | OUTPATIENT
Start: 2018-11-03 | End: 2018-11-07 | Stop reason: HOSPADM

## 2018-11-03 RX ADMIN — AZITHROMYCIN MONOHYDRATE 500 MG: 500 INJECTION, POWDER, LYOPHILIZED, FOR SOLUTION INTRAVENOUS at 10:23

## 2018-11-03 RX ADMIN — ENOXAPARIN SODIUM 40 MG: 40 INJECTION SUBCUTANEOUS at 09:32

## 2018-11-03 RX ADMIN — POTASSIUM CHLORIDE 40 MEQ: 1500 TABLET, EXTENDED RELEASE ORAL at 14:45

## 2018-11-03 RX ADMIN — GUAIFENESIN 1200 MG: 600 TABLET, EXTENDED RELEASE ORAL at 09:32

## 2018-11-03 RX ADMIN — DOCUSATE SODIUM 100 MG: 100 CAPSULE, LIQUID FILLED ORAL at 18:26

## 2018-11-03 RX ADMIN — PANTOPRAZOLE SODIUM 20 MG: 20 TABLET, DELAYED RELEASE ORAL at 06:00

## 2018-11-03 RX ADMIN — CEFTRIAXONE 1000 MG: 1 INJECTION, POWDER, FOR SOLUTION INTRAMUSCULAR; INTRAVENOUS at 09:32

## 2018-11-03 RX ADMIN — ISODIUM CHLORIDE 3 ML: 0.03 SOLUTION RESPIRATORY (INHALATION) at 19:50

## 2018-11-03 RX ADMIN — GABAPENTIN 300 MG: 300 CAPSULE ORAL at 09:32

## 2018-11-03 RX ADMIN — DULOXETINE HYDROCHLORIDE 20 MG: 20 CAPSULE, DELAYED RELEASE ORAL at 09:32

## 2018-11-03 RX ADMIN — ISODIUM CHLORIDE 3 ML: 0.03 SOLUTION RESPIRATORY (INHALATION) at 08:20

## 2018-11-03 RX ADMIN — ATORVASTATIN CALCIUM 20 MG: 80 TABLET, FILM COATED ORAL at 09:32

## 2018-11-03 RX ADMIN — LEVALBUTEROL HYDROCHLORIDE 1.25 MG: 1.25 SOLUTION, CONCENTRATE RESPIRATORY (INHALATION) at 13:43

## 2018-11-03 RX ADMIN — LEVOTHYROXINE SODIUM 112 MCG: 112 TABLET ORAL at 06:55

## 2018-11-03 RX ADMIN — MONTELUKAST SODIUM 10 MG: 10 TABLET, FILM COATED ORAL at 22:14

## 2018-11-03 RX ADMIN — POTASSIUM CHLORIDE 20 MEQ: 200 INJECTION, SOLUTION INTRAVENOUS at 14:45

## 2018-11-03 RX ADMIN — CEFEPIME HYDROCHLORIDE 2000 MG: 2 INJECTION, POWDER, FOR SOLUTION INTRAVENOUS at 12:44

## 2018-11-03 RX ADMIN — CEFEPIME HYDROCHLORIDE 2000 MG: 2 INJECTION, POWDER, FOR SOLUTION INTRAVENOUS at 22:17

## 2018-11-03 RX ADMIN — LEVALBUTEROL HYDROCHLORIDE 1.25 MG: 1.25 SOLUTION, CONCENTRATE RESPIRATORY (INHALATION) at 08:20

## 2018-11-03 RX ADMIN — ISODIUM CHLORIDE 3 ML: 0.03 SOLUTION RESPIRATORY (INHALATION) at 13:43

## 2018-11-03 RX ADMIN — LEVALBUTEROL HYDROCHLORIDE 1.25 MG: 1.25 SOLUTION, CONCENTRATE RESPIRATORY (INHALATION) at 19:50

## 2018-11-03 RX ADMIN — INSULIN GLARGINE 5 UNITS: 100 INJECTION, SOLUTION SUBCUTANEOUS at 22:14

## 2018-11-03 RX ADMIN — GUAIFENESIN 1200 MG: 600 TABLET, EXTENDED RELEASE ORAL at 22:14

## 2018-11-03 RX ADMIN — DOCUSATE SODIUM 100 MG: 100 CAPSULE, LIQUID FILLED ORAL at 09:32

## 2018-11-03 NOTE — QUICK NOTE
Pt took meds crushed in pureed bananas but continues to refuse breakfast when offered, even tried offering other options

## 2018-11-03 NOTE — PROGRESS NOTES
Spoke with pt's daughter about her plan of care, she said Paola Zamudio had updated them last pm and she did not need to speak to her at this time

## 2018-11-03 NOTE — ASSESSMENT & PLAN NOTE
With sepsis  Community-acquired  Admit to inpatient  Started on ceftriaxone and azithromycin, continued for another 24 hrs however to see improvement, pt with improvement with her labs however physically still very lethargic but able to somewhat mumble appropriate responses   Will broaden abx coverage dc azithro and dc ceftriaxone start cefepime   Await blood in process and sputum culture not yet obtained  Await pneumonia antigens  Monitor temps, procalcitonin 1 29, repeat this  A m  Was 0 88  , and WBC count, elevated at 28 51 yesterday improved to 21 64  Supplemental oxygen 2 L nasal cannula  Airway clearance protocol  P r n  Bronchodilators    Mucinex  Feel pt will respond to percussion vest will order as pt unable to utilized incentive or flutter valve

## 2018-11-03 NOTE — PROGRESS NOTES
Progress Note - Marina Grace 10/31/1929, 80 y o  female MRN: [id number]    Unit/Bed#: [facility] 725-01 Encounter: [id number]    Primary Care Provider: No primary care provider on file  Date and time admitted to hospital: 11/1/2018 12:15 PM        * Left upper lobe pneumonia [facility])   Assessment & Plan    With sepsis  Community-acquired  Admit to inpatient  Started on ceftriaxone and azithromycin, continued for another 24 hrs however to see improvement, pt with improvement with her labs however physically still very lethargic but able to somewhat mumble appropriate responses   Will broaden abx coverage dc azithro and dc ceftriaxone start cefepime   Await blood in process and sputum culture not yet obtained  Await pneumonia antigens  Monitor temps, procalcitonin 1 29, repeat this  A m  Was 0 88  , and WBC count, elevated at 28 51 yesterday improved to 21 64  Supplemental oxygen 2 L nasal cannula  Airway clearance protocol  P r n  Bronchodilators  Mucinex  Feel pt will respond to percussion vest will order as pt unable to utilized incentive or flutter valve      Toxic metabolic encephalopathy   Assessment & Plan    Due to sepsis, POA   Treatment as above     Abdominal aortic aneurysm (AAA) 3 0 cm to 5 0 cm in diameter in female [facility])   Assessment & Plan    Incidentally found on CT scan on admission   Will need vascular surgery follow-up as an outpatient  CT:  5 cm infrarenal abdominal aortic aneurysm  Intermittent asthma   Assessment & Plan    Appears stable at this time  Continue Singulair and bronchodilators, since pt with pneumonia      HLD (hyperlipidemia)   Assessment & Plan    Continue statin     Hypothyroidism   Assessment & Plan    Continue levothyroxine     Coronary artery disease of native artery of native heart with stable angina pectoris [facility])   Assessment & Plan    Continue statin  P r n  Nitroglycerin    At this time due to encephalopathy unable to gather further information regarding cardiac disease  Type 2 diabetes mellitus without complication, without long-term current use of insulin [facility])   Assessment & Plan    No results found for: HGBA1C    Recent Labs      18   1113  18   1643  18   2144  18   0645   POCGLU  126  99  106  85       Blood Sugar Average: Last 72 hrs:  (P) 229 [id number]   Hold metformin while hospitalized  started basal Lantus, can discontinue upon discharge when restart metformin  Sliding scale coverage  Carb controlled diet  VTE Pharmacologic Prophylaxis:   Pharmacologic: Enoxaparin (Lovenox)  Mechanical VTE Prophylaxis in Place: Yes    Patient Centered Rounds: I have performed bedside rounds with nursing staff today  Discussions with Specialists or Other Care Team Provider: nursing     Education and Discussions with Family / Patient: patient     Time Spent for Care: 30 minutes  More than 50% of total time spent on counseling and coordination of care as described above  Current Length of Stay: 2 day(s)    Current Patient Status: Inpatient   Certification Statement: The patient will continue to require additional inpatient hospital stay due to on going lethargy wbc and iv abx tx for pna    Discharge Plan: pt ot when able at this time pt just mumbling     Code Status: Level 3 - DNAR and DNI      Subjective:   Pt with ongoing lethargy although able to appropriately respond/mumble answers to me  Will open eyes to stimuli  Patient with rhonchi throughout unable to fully expectorates herself  Cannot utilize flutter or incentive due to lethargy  No fevers overnight  No on going issues per nursing  Patient just states she feels bad  Objective:     Vitals:   Temp (24hrs), Av 9 °F (37 2 °C), Min:98 1 °F (36 7 °C), Max:99 5 °F (37 5 °C)    Temp:  [98 1 °F (36 7 °C)-99 5 °F (37 5 °C)] 98 8 °F (37 1 °C)  HR:  [101-105] 101  Resp:  [18] 18  BP: (139-184)/(65-85) 169/70  SpO2:  [92 %-98 %] 95 %  Body mass index is 23 1 kg/m²  Input and Output Summary (last 24 hours): Intake/Output Summary (Last 24 hours) at 11/03/18 1212  Last data filed at 11/03/18 1023   Gross per 24 hour   Intake             1305 ml   Output              772 ml   Net              533 ml       Physical Exam:     Physical Exam   HENT:   Head: Normocephalic and atraumatic  Eyes: Conjunctivae are normal  Right eye exhibits no discharge  Left eye exhibits no discharge  No scleral icterus  Neck: No JVD present  No tracheal deviation present  No thyromegaly present  Cardiovascular:   Hard to hear due to lung sounds    Pulmonary/Chest: No stridor  No respiratory distress  She has no wheezes  She has rales  She exhibits no tenderness  Abdominal: She exhibits no distension and no mass  There is no tenderness  There is no rebound and no guarding  Musculoskeletal: She exhibits no edema, tenderness or deformity  Lymphadenopathy:     She has no cervical adenopathy  Neurological:   Lethargic knows she is in the hospital    Skin: No rash noted  No erythema  No pallor  Psychiatric:   Lethargic          Additional Data:     Labs:      Results from last 7 days  Lab Units 11/03/18  0621  11/01/18  1233   WBC Thousand/uL 21 64*  < > 29 82*   HEMOGLOBIN g/dL 11 4*  < > 12 6   HEMATOCRIT % 35 8  < > 40 1   PLATELETS Thousands/uL 276  < > 300   BANDS PCT %  --   --  6   NEUTROS PCT % 85*  < >  --    LYMPHS PCT % 8*  < >  --    LYMPHO PCT %  --   --  3*   MONOS PCT % 4  < >  --    MONO PCT %  --   --  4   EOS PCT % 1  < > 0   < > = values in this interval not displayed      Results from last 7 days  Lab Units 11/02/18  0656 11/01/18  1233   POTASSIUM mmol/L 3 9 4 0   CHLORIDE mmol/L 101 101   CO2 mmol/L 26 29   BUN mg/dL 12 19   CREATININE mg/dL 0 46* 0 62   ANION GAP mmol/L 6 4   CALCIUM mg/dL 8 2* 8 4   ALBUMIN g/dL  --  2 9*   TOTAL BILIRUBIN mg/dL  --  0 46   ALK PHOS U/L  --  93   ALT U/L  --  11*   AST U/L  --  15       Results from last 7 days  Lab Units 11/01/18  1233   INR  1 01       Results from last 7 days  Lab Units 11/03/18  1109 11/03/18  0645 11/02/18  2144 11/02/18  1643 11/02/18  1113 11/02/18  0626 11/01/18  2107   POC GLUCOSE mg/dl 97 85 106 99 126 117 123           Results from last 7 days  Lab Units 11/03/18  0621 11/01/18  2206 11/01/18  1233   LACTIC ACID mmol/L  --   --  1 5   PROCALCITONIN ng/ml 0 88* 1 29*  --            * I Have Reviewed All Lab Data Listed Above  * Additional Pertinent Lab Tests Reviewed: All Labs Within Last 24 Hours Reviewed    Imaging:    Imaging Reports Reviewed Today Include: reviewed     Recent Cultures (last 7 days):       Results from last 7 days  Lab Units 11/02/18  0834 11/01/18  1234 11/01/18  1233   BLOOD CULTURE   --  No Growth at 24 hrs  No Growth at 24 hrs     LEGIONELLA URINARY ANTIGEN  Negative  --   --        Last 24 Hours Medication List:     Current Facility-Administered Medications:  acetaminophen 650 mg Oral Once Leann Mcclelland MD   acetaminophen 650 mg Oral Q6H PRN Kevon Celaya MD   albuterol 2 5 mg Nebulization Q4H PRN Pavel Garcia MD   atorvastatin 20 mg Oral Daily Kevon Celaya MD   cefepime 2,000 mg Intravenous Q12H Tyler Soulier, CRNP   docusate sodium 100 mg Oral BID Kevon Celaya MD   DULoxetine 20 mg Oral Daily Kevon Celaya MD   enoxaparin 40 mg Subcutaneous Daily Kevon Celaya MD   gabapentin 300 mg Oral Daily Kevon Celaya MD   guaiFENesin 1,200 mg Oral Q12H Joana Higginbotham MD   insulin glargine 5 Units Subcutaneous HS Kevon Celaya MD   insulin lispro 1-5 Units Subcutaneous TID AC Kevon Celaya MD   levalbuterol 1 25 mg Nebulization TID Pavel Garcia MD   levothyroxine 112 mcg Oral Daily Kevon Celaya MD   LORazepam 0 5 mg Oral Q8H PRN Kevon Celaya MD   montelukast 10 mg Oral HS Kevon Celaya MD   pantoprazole 20 mg Oral Early Morning Kevon Celaya MD   pneumococcal 13-valent conjugate vaccine 0 5 mL Intramuscular Prior to discharge Pavel Garcia MD   sodium chloride 3 mL Nebulization TID Nancy Carranza MD        Today, Patient Was Seen By: AMERICA Dillon    ** Please Note: Dictation voice to text software may have been used in the creation of this document   **

## 2018-11-03 NOTE — ASSESSMENT & PLAN NOTE
No results found for: HGBA1C    Recent Labs      11/02/18   1113  11/02/18   1643  11/02/18   2144  11/03/18   0645   POCGLU  126  99  106  85       Blood Sugar Average: Last 72 hrs:  (P) 038 [id number]   Hold metformin while hospitalized  started basal Lantus, can discontinue upon discharge when restart metformin  Sliding scale coverage  Carb controlled diet

## 2018-11-04 LAB
ANION GAP SERPL CALCULATED.3IONS-SCNC: 5 MMOL/L (ref 4–13)
BASOPHILS # BLD AUTO: 0.09 THOUSANDS/ΜL (ref 0–0.1)
BASOPHILS NFR BLD AUTO: 1 % (ref 0–1)
BUN SERPL-MCNC: 10 MG/DL (ref 5–25)
CALCIUM SERPL-MCNC: 9 MG/DL (ref 8.3–10.1)
CHLORIDE SERPL-SCNC: 100 MMOL/L (ref 100–108)
CO2 SERPL-SCNC: 29 MMOL/L (ref 21–32)
CREAT SERPL-MCNC: 0.46 MG/DL (ref 0.6–1.3)
EOSINOPHIL # BLD AUTO: 0.09 THOUSAND/ΜL (ref 0–0.61)
EOSINOPHIL NFR BLD AUTO: 1 % (ref 0–6)
ERYTHROCYTE [DISTWIDTH] IN BLOOD BY AUTOMATED COUNT: 13.3 % (ref 11.6–15.1)
GFR SERPL CREATININE-BSD FRML MDRD: 88 ML/MIN/1.73SQ M
GLUCOSE SERPL-MCNC: 101 MG/DL (ref 65–140)
GLUCOSE SERPL-MCNC: 95 MG/DL (ref 65–140)
GLUCOSE SERPL-MCNC: 97 MG/DL (ref 65–140)
GLUCOSE SERPL-MCNC: 98 MG/DL (ref 65–140)
GLUCOSE SERPL-MCNC: 98 MG/DL (ref 65–140)
HCT VFR BLD AUTO: 39.2 % (ref 34.8–46.1)
HGB BLD-MCNC: 12.3 G/DL (ref 11.5–15.4)
IMM GRANULOCYTES # BLD AUTO: 0.32 THOUSAND/UL (ref 0–0.2)
IMM GRANULOCYTES NFR BLD AUTO: 2 % (ref 0–2)
LYMPHOCYTES # BLD AUTO: 1.6 THOUSANDS/ΜL (ref 0.6–4.47)
LYMPHOCYTES NFR BLD AUTO: 9 % (ref 14–44)
MCH RBC QN AUTO: 29.8 PG (ref 26.8–34.3)
MCHC RBC AUTO-ENTMCNC: 31.4 G/DL (ref 31.4–37.4)
MCV RBC AUTO: 95 FL (ref 82–98)
MONOCYTES # BLD AUTO: 0.89 THOUSAND/ΜL (ref 0.17–1.22)
MONOCYTES NFR BLD AUTO: 5 % (ref 4–12)
MRSA NOSE QL CULT: NORMAL
NEUTROPHILS # BLD AUTO: 14.58 THOUSANDS/ΜL (ref 1.85–7.62)
NEUTS SEG NFR BLD AUTO: 82 % (ref 43–75)
NRBC BLD AUTO-RTO: 0 /100 WBCS
PLATELET # BLD AUTO: 332 THOUSANDS/UL (ref 149–390)
PMV BLD AUTO: 10.3 FL (ref 8.9–12.7)
POTASSIUM SERPL-SCNC: 3.8 MMOL/L (ref 3.5–5.3)
PROCALCITONIN SERPL-MCNC: 0.28 NG/ML
RBC # BLD AUTO: 4.13 MILLION/UL (ref 3.81–5.12)
SODIUM SERPL-SCNC: 134 MMOL/L (ref 136–145)
WBC # BLD AUTO: 17.57 THOUSAND/UL (ref 4.31–10.16)

## 2018-11-04 PROCEDURE — 92526 ORAL FUNCTION THERAPY: CPT

## 2018-11-04 PROCEDURE — 99232 SBSQ HOSP IP/OBS MODERATE 35: CPT | Performed by: NURSE PRACTITIONER

## 2018-11-04 PROCEDURE — 94760 N-INVAS EAR/PLS OXIMETRY 1: CPT

## 2018-11-04 PROCEDURE — 80048 BASIC METABOLIC PNL TOTAL CA: CPT | Performed by: NURSE PRACTITIONER

## 2018-11-04 PROCEDURE — 85025 COMPLETE CBC W/AUTO DIFF WBC: CPT | Performed by: NURSE PRACTITIONER

## 2018-11-04 PROCEDURE — 94669 MECHANICAL CHEST WALL OSCILL: CPT

## 2018-11-04 PROCEDURE — 82948 REAGENT STRIP/BLOOD GLUCOSE: CPT

## 2018-11-04 PROCEDURE — 94640 AIRWAY INHALATION TREATMENT: CPT

## 2018-11-04 PROCEDURE — 84145 PROCALCITONIN (PCT): CPT | Performed by: NURSE PRACTITIONER

## 2018-11-04 RX ORDER — DEXTROSE AND SODIUM CHLORIDE 5; .9 G/100ML; G/100ML
50 INJECTION, SOLUTION INTRAVENOUS CONTINUOUS
Status: DISCONTINUED | OUTPATIENT
Start: 2018-11-04 | End: 2018-11-06

## 2018-11-04 RX ADMIN — CEFEPIME HYDROCHLORIDE 2000 MG: 2 INJECTION, POWDER, FOR SOLUTION INTRAVENOUS at 10:16

## 2018-11-04 RX ADMIN — PIPERACILLIN SODIUM AND TAZOBACTAM SODIUM 3.38 G: 36; 4.5 INJECTION, POWDER, FOR SOLUTION INTRAVENOUS at 22:30

## 2018-11-04 RX ADMIN — PIPERACILLIN SODIUM AND TAZOBACTAM SODIUM 3.38 G: 36; 4.5 INJECTION, POWDER, FOR SOLUTION INTRAVENOUS at 17:39

## 2018-11-04 RX ADMIN — ENOXAPARIN SODIUM 40 MG: 40 INJECTION SUBCUTANEOUS at 08:19

## 2018-11-04 RX ADMIN — DOCUSATE SODIUM 100 MG: 100 CAPSULE, LIQUID FILLED ORAL at 16:36

## 2018-11-04 RX ADMIN — LEVOTHYROXINE SODIUM 112 MCG: 112 TABLET ORAL at 05:10

## 2018-11-04 RX ADMIN — INSULIN GLARGINE 5 UNITS: 100 INJECTION, SOLUTION SUBCUTANEOUS at 22:28

## 2018-11-04 RX ADMIN — ISODIUM CHLORIDE 3 ML: 0.03 SOLUTION RESPIRATORY (INHALATION) at 08:10

## 2018-11-04 RX ADMIN — GUAIFENESIN 1200 MG: 600 TABLET, EXTENDED RELEASE ORAL at 21:46

## 2018-11-04 RX ADMIN — LEVALBUTEROL HYDROCHLORIDE 1.25 MG: 1.25 SOLUTION, CONCENTRATE RESPIRATORY (INHALATION) at 13:03

## 2018-11-04 RX ADMIN — ATORVASTATIN CALCIUM 20 MG: 80 TABLET, FILM COATED ORAL at 08:19

## 2018-11-04 RX ADMIN — POTASSIUM CHLORIDE 20 MEQ: 200 INJECTION, SOLUTION INTRAVENOUS at 08:19

## 2018-11-04 RX ADMIN — GUAIFENESIN 1200 MG: 600 TABLET, EXTENDED RELEASE ORAL at 08:19

## 2018-11-04 RX ADMIN — POTASSIUM CHLORIDE 20 MEQ: 200 INJECTION, SOLUTION INTRAVENOUS at 16:37

## 2018-11-04 RX ADMIN — LEVALBUTEROL HYDROCHLORIDE 1.25 MG: 1.25 SOLUTION, CONCENTRATE RESPIRATORY (INHALATION) at 20:21

## 2018-11-04 RX ADMIN — LEVALBUTEROL HYDROCHLORIDE 1.25 MG: 1.25 SOLUTION, CONCENTRATE RESPIRATORY (INHALATION) at 08:10

## 2018-11-04 RX ADMIN — GABAPENTIN 300 MG: 300 CAPSULE ORAL at 08:19

## 2018-11-04 RX ADMIN — MONTELUKAST SODIUM 10 MG: 10 TABLET, FILM COATED ORAL at 21:46

## 2018-11-04 RX ADMIN — ISODIUM CHLORIDE 3 ML: 0.03 SOLUTION RESPIRATORY (INHALATION) at 13:03

## 2018-11-04 RX ADMIN — ISODIUM CHLORIDE 3 ML: 0.03 SOLUTION RESPIRATORY (INHALATION) at 20:21

## 2018-11-04 RX ADMIN — DULOXETINE HYDROCHLORIDE 20 MG: 20 CAPSULE, DELAYED RELEASE ORAL at 08:19

## 2018-11-04 RX ADMIN — DEXTROSE AND SODIUM CHLORIDE 50 ML/HR: 5; .9 INJECTION, SOLUTION INTRAVENOUS at 16:35

## 2018-11-04 RX ADMIN — PANTOPRAZOLE SODIUM 20 MG: 20 TABLET, DELAYED RELEASE ORAL at 05:10

## 2018-11-04 NOTE — MEDICAL STUDENT
[facility] Progress Note  Patient: Corby Leon 80 y o  female   MRN: [id number]  PCP: No primary care provider on file    Unit/Bed#: [facility] 725-01 Encounter: [id number]  Date Of Visit: 11/04/18    Assessment:    Principal Problem:    Left upper lobe pneumonia (HCC)  Active Problems:    Type 2 diabetes mellitus without complication, without long-term current use of insulin (HCC)    Coronary artery disease of native artery of native heart with stable angina pectoris (HCC)    Intermittent asthma    Abdominal aortic aneurysm (AAA) 3 0 cm to 5 0 cm in diameter in female [facility])    Hypothyroidism    HLD (hyperlipidemia)    Toxic metabolic encephalopathy      Plan:    · Left upper lobe pneumonia  · Community acquired  · CXR and CT chest show RUL infiltrate  · WBC downtrended to 17 from 21 yesterday  · Afebrile  · Initially on ceftriaxone and azithromycin, converted to cefepime yesterday  · Pt is increasingly lethargic today, only able to stay awake briefyl, possibly related to cefepime  · Will stop cefepime and start zosyn  · Procalcitonin trended down yesterday, will repeat tomorrow am  · Supplemental O2 as needed  · PRN albuterol  · Scheduled mucinex, vest tx for mucokinesis    · Toxic metabolic encephalopathy  · Likely related to infection, illness  · No focal deficits seen  · Does remain quite lethargic  · Possibly related to cefepime, transition to zosyn   · Treatment as above    · Abdominal aortic aneurysm   · Incidental finding on CT scan  · 5cm infrarenal abdominal aortic aneurysm   · Follow up with vascular surgery as outpt    · Intermittent asthma  · Stable at this time  · Continue singulair and bronchodilators    · Hyperlipidemia  · Continue statin    · Hypothyroidism  · TSH low, Free t4 normal   · Continue synthroid    · CAD  · Continue statin  · PRN nitroglycerin     · Diabetes mellitus type 2  · BG well controlled this admission  · Holding metformin while inpatient  · Lantus and sliding scale humalog coverage               VTE Pharmacologic Prophylaxis:   Pharmacologic: Enoxaparin (Lovenox)  Mechanical VTE Prophylaxis in Place: Yes    Patient Centered Rounds: I have performed bedside rounds with nursing staff today  Discussions with Specialists or Other Care Team Provider: Bedside RN    Education and Discussions with Family / Patient: Patient, daughter    Time Spent for Care: 45 minutes  More than 50% of total time spent on counseling and coordination of care as described above  Current Length of Stay: 3 day(s)    Current Patient Status: Inpatient   Certification Statement: The patient will continue to require additional inpatient hospital stay due to pneumonia treatment    Discharge Plan / Estimated Discharge Date: Unknown at this time    Code Status: Level 3 - DNAR and DNI      Subjective:   Pt is somnolent and lethargic  ROS difficult to obtain due to mental status  Pt's daughter at bedside and was helpful with answering questions  Does not appear in distress  Objective:     Vitals:   Temp (24hrs), Av 6 °F (37 °C), Min:98 3 °F (36 8 °C), Max:99 °F (37 2 °C)    Temp:  [98 3 °F (36 8 °C)-99 °F (37 2 °C)] 98 3 °F (36 8 °C)  HR:  [100-102] 102  Resp:  [18] 18  BP: (140-165)/(62-81) 165/81  SpO2:  [92 %-95 %] 93 %  Body mass index is 23 1 kg/m²  Input and Output Summary (last 24 hours): Intake/Output Summary (Last 24 hours) at 18 1551  Last data filed at 18 1210   Gross per 24 hour   Intake              150 ml   Output              643 ml   Net             -493 ml       Physical Exam:     Physical Exam   Constitutional: No distress  cachechtic   HENT:   Head: Normocephalic and atraumatic  Mouth/Throat: No oropharyngeal exudate  Eyes: Pupils are equal, round, and reactive to light  Conjunctivae are normal  Right eye exhibits no discharge  Left eye exhibits no discharge  No scleral icterus  Neck: Normal range of motion  No JVD present   No tracheal deviation present  Cardiovascular: Normal rate, regular rhythm, normal heart sounds and intact distal pulses  No murmur heard  Pulmonary/Chest: Effort normal  No stridor  No respiratory distress  She has no wheezes  She has no rales  She exhibits no tenderness  B/L crackles in lower lung fields   Abdominal: Soft  Bowel sounds are normal  She exhibits no distension and no mass  There is no tenderness  There is no rebound and no guarding  Musculoskeletal: She exhibits no edema, tenderness or deformity  Neurological:   Lethargic, oriented to person and place   Skin: Skin is warm and dry  No rash noted  She is not diaphoretic  No erythema  There is pallor  Psychiatric:   Lethargic, somnolent         Additional Data:     Labs:      Results from last 7 days  Lab Units 11/04/18  0518   WBC Thousand/uL 17 57*   HEMOGLOBIN g/dL 12 3   HEMATOCRIT % 39 2   PLATELETS Thousands/uL 332   NEUTROS PCT % 82*   LYMPHS PCT % 9*   MONOS PCT % 5   EOS PCT % 1       Results from last 7 days  Lab Units 11/04/18  0518  11/03/18  1228   POTASSIUM mmol/L 3 8  < > 2 4*   CHLORIDE mmol/L 100  < > 110*   CO2 mmol/L 29  < > 21   BUN mg/dL 10  < > 7   CREATININE mg/dL 0 46*  < > 0 24*   CALCIUM mg/dL 9 0  < > 6 3*   ALK PHOS U/L  --   --  66   ALT U/L  --   --  10*   AST U/L  --   --  10   < > = values in this interval not displayed  Results from last 7 days  Lab Units 11/01/18  1233   INR  1 01       * I Have Reviewed All Lab Data Listed Above  * Additional Pertinent Lab Tests Reviewed: All Labs For Current Hospital Admission Reviewed    Imaging:    Imaging Reports Reviewed Today Include: All  Imaging Personally Reviewed by Myself Includes: All    Recent Cultures (last 7 days):       Results from last 7 days  Lab Units 11/02/18  0834 11/01/18  1234 11/01/18  1233   BLOOD CULTURE   --  No Growth at 72 hrs  No Growth at 72 hrs     LEGIONELLA URINARY ANTIGEN  Negative  --   --        Last 24 Hours Medication List:     Current Facility-Administered Medications:  acetaminophen 650 mg Oral Once Edu Coleman MD    acetaminophen 650 mg Oral Q6H PRN Mckinley Orlando MD    albuterol 2 5 mg Nebulization Q4H PRN Devaughn Lemus MD    atorvastatin 20 mg Oral Daily Mckinley Orlando MD    cefepime 2,000 mg Intravenous Q12H AMERICA Gonsalez Last Rate: Stopped (11/04/18 1201)   docusate sodium 100 mg Oral BID Mckinley Orlando MD    DULoxetine 20 mg Oral Daily Mckinley Orlando MD    enoxaparin 40 mg Subcutaneous Daily Mckinley Orlando MD    gabapentin 300 mg Oral Daily Mckinley Orlando, MD    guaiFENesin 1,200 mg Oral Q12H Roland Vieira MD    insulin glargine 5 Units Subcutaneous HS Mckinley Orlando MD    insulin lispro 1-5 Units Subcutaneous TID AC Mckinley Orlando, MD    levalbuterol 1 25 mg Nebulization TID Devaughn Lemus MD    levothyroxine 112 mcg Oral Daily Mckinley Orlando MD    LORazepam 0 5 mg Oral Q8H PRN Mckinley Orlando MD    montelukast 10 mg Oral HS Mckinley Orlando MD    pantoprazole 20 mg Oral Early Morning Mckinley Orlando MD    pneumococcal 13-valent conjugate vaccine 0 5 mL Intramuscular Prior to discharge Devaughn Lemus MD    potassium chloride 20 mEq Intravenous BID AMERICA Gonsalez Last Rate: Stopped (11/04/18 1201)   sodium chloride 3 mL Nebulization TID Devaughn Lemus MD         Today, Patient Was Seen By: Baljinder Lucas, Student Nurse Practitioner    ** Please Note: This note has been constructed using a voice recognition system   **

## 2018-11-04 NOTE — SPEECH THERAPY NOTE
Speech Language/Pathology    Speech/Language Pathology Progress Note    Patient Name: Megan Roche  Today's Date: 11/4/2018     Problem List  Patient Active Problem List   Diagnosis    [facility]    Type 2 diabetes mellitus without complication, without long-term current use of insulin [facility]    Coronary artery disease of native artery of native heart with stable angina pectoris ([facility] )    Intermittent asthma    Abdominal aortic aneurysm (AAA) 3 0 cm to 5 0 cm in diameter in female ([facility] )    Hypothyroidism    HLD (hyperlipidemia)    Toxic metabolic encephalopathy        Past Medical History  Past Medical History:   Diagnosis Date    Bell's palsy remote    Coronary artery disease     Diabetes mellitus ([facility] )     Disease of thyroid gland     GERD (gastroesophageal reflux disease)     Hyperlipidemia     Hypertension     Psychiatric disorder     Stroke [facility])         Past Surgical History  History reviewed  No pertinent surgical history  Subjective:    Pt was seen for skilled ST this afternoon  Daughter present in room  Pt was lethargic and sleeping upon arrival  Daughter reported that she would not eat or drink  Objective:    Pt was assessed with 2 oz pudding and 4 tsps HTL  Pt then refused other trials  Manipulation and transfer were slow with puree however no s/s of aspiration were noted  However with HTL pt demonstrated immediate cough prior to the swallow x1 and delayed x1 by tsp  Suspect due to lethargy today oral control is reduced  Assessment:    Assessment was very limited due to pt refusal to accept PO trials however with limited amounts taken pt tolerated pudding without s/s of aspiration however increased coughing was noted with HTL by tsp  Pt may benefit from downgrade to pudding thick liquids for today      Plan/Recommendations:    Continue with pureed diet as tolerated  Downgrade to pudding thick liquids by tsp  Feed only when awake and alert  Speech to follow

## 2018-11-05 LAB
ANION GAP SERPL CALCULATED.3IONS-SCNC: 5 MMOL/L (ref 4–13)
BASOPHILS # BLD AUTO: 0.06 THOUSANDS/ΜL (ref 0–0.1)
BASOPHILS NFR BLD AUTO: 0 % (ref 0–1)
BUN SERPL-MCNC: 10 MG/DL (ref 5–25)
CALCIUM SERPL-MCNC: 8.9 MG/DL (ref 8.3–10.1)
CHLORIDE SERPL-SCNC: 101 MMOL/L (ref 100–108)
CO2 SERPL-SCNC: 29 MMOL/L (ref 21–32)
CREAT SERPL-MCNC: 0.47 MG/DL (ref 0.6–1.3)
EOSINOPHIL # BLD AUTO: 0.21 THOUSAND/ΜL (ref 0–0.61)
EOSINOPHIL NFR BLD AUTO: 2 % (ref 0–6)
ERYTHROCYTE [DISTWIDTH] IN BLOOD BY AUTOMATED COUNT: 13.2 % (ref 11.6–15.1)
GFR SERPL CREATININE-BSD FRML MDRD: 88 ML/MIN/1.73SQ M
GLUCOSE SERPL-MCNC: 103 MG/DL (ref 65–140)
GLUCOSE SERPL-MCNC: 105 MG/DL (ref 65–140)
GLUCOSE SERPL-MCNC: 106 MG/DL (ref 65–140)
GLUCOSE SERPL-MCNC: 111 MG/DL (ref 65–140)
GLUCOSE SERPL-MCNC: 115 MG/DL (ref 65–140)
HCT VFR BLD AUTO: 35.6 % (ref 34.8–46.1)
HGB BLD-MCNC: 11.3 G/DL (ref 11.5–15.4)
IMM GRANULOCYTES # BLD AUTO: 0.27 THOUSAND/UL (ref 0–0.2)
IMM GRANULOCYTES NFR BLD AUTO: 2 % (ref 0–2)
LYMPHOCYTES # BLD AUTO: 1.64 THOUSANDS/ΜL (ref 0.6–4.47)
LYMPHOCYTES NFR BLD AUTO: 12 % (ref 14–44)
M PNEUMO IGG SER IA-ACNC: 982 U/ML (ref 0–99)
M PNEUMO IGM SER IA-ACNC: <770 U/ML (ref 0–769)
MCH RBC QN AUTO: 29.6 PG (ref 26.8–34.3)
MCHC RBC AUTO-ENTMCNC: 31.7 G/DL (ref 31.4–37.4)
MCV RBC AUTO: 93 FL (ref 82–98)
MONOCYTES # BLD AUTO: 1.08 THOUSAND/ΜL (ref 0.17–1.22)
MONOCYTES NFR BLD AUTO: 8 % (ref 4–12)
NEUTROPHILS # BLD AUTO: 10.7 THOUSANDS/ΜL (ref 1.85–7.62)
NEUTS SEG NFR BLD AUTO: 76 % (ref 43–75)
NRBC BLD AUTO-RTO: 0 /100 WBCS
PLATELET # BLD AUTO: 336 THOUSANDS/UL (ref 149–390)
PMV BLD AUTO: 10 FL (ref 8.9–12.7)
POTASSIUM SERPL-SCNC: 3.8 MMOL/L (ref 3.5–5.3)
RBC # BLD AUTO: 3.82 MILLION/UL (ref 3.81–5.12)
SODIUM SERPL-SCNC: 135 MMOL/L (ref 136–145)
WBC # BLD AUTO: 13.96 THOUSAND/UL (ref 4.31–10.16)

## 2018-11-05 PROCEDURE — 94760 N-INVAS EAR/PLS OXIMETRY 1: CPT

## 2018-11-05 PROCEDURE — 97530 THERAPEUTIC ACTIVITIES: CPT

## 2018-11-05 PROCEDURE — 99232 SBSQ HOSP IP/OBS MODERATE 35: CPT | Performed by: NURSE PRACTITIONER

## 2018-11-05 PROCEDURE — 94640 AIRWAY INHALATION TREATMENT: CPT

## 2018-11-05 PROCEDURE — 92526 ORAL FUNCTION THERAPY: CPT

## 2018-11-05 PROCEDURE — 94669 MECHANICAL CHEST WALL OSCILL: CPT

## 2018-11-05 PROCEDURE — 97110 THERAPEUTIC EXERCISES: CPT

## 2018-11-05 PROCEDURE — 85025 COMPLETE CBC W/AUTO DIFF WBC: CPT | Performed by: NURSE PRACTITIONER

## 2018-11-05 PROCEDURE — 97535 SELF CARE MNGMENT TRAINING: CPT

## 2018-11-05 PROCEDURE — 94668 MNPJ CHEST WALL SBSQ: CPT

## 2018-11-05 PROCEDURE — 80048 BASIC METABOLIC PNL TOTAL CA: CPT | Performed by: NURSE PRACTITIONER

## 2018-11-05 PROCEDURE — 82948 REAGENT STRIP/BLOOD GLUCOSE: CPT

## 2018-11-05 RX ADMIN — DULOXETINE HYDROCHLORIDE 20 MG: 20 CAPSULE, DELAYED RELEASE ORAL at 08:42

## 2018-11-05 RX ADMIN — PIPERACILLIN SODIUM AND TAZOBACTAM SODIUM 3.38 G: 36; 4.5 INJECTION, POWDER, FOR SOLUTION INTRAVENOUS at 22:47

## 2018-11-05 RX ADMIN — PIPERACILLIN SODIUM AND TAZOBACTAM SODIUM 3.38 G: 36; 4.5 INJECTION, POWDER, FOR SOLUTION INTRAVENOUS at 11:52

## 2018-11-05 RX ADMIN — ISODIUM CHLORIDE 3 ML: 0.03 SOLUTION RESPIRATORY (INHALATION) at 20:23

## 2018-11-05 RX ADMIN — MONTELUKAST SODIUM 10 MG: 10 TABLET, FILM COATED ORAL at 21:17

## 2018-11-05 RX ADMIN — ATORVASTATIN CALCIUM 20 MG: 80 TABLET, FILM COATED ORAL at 08:43

## 2018-11-05 RX ADMIN — ISODIUM CHLORIDE 3 ML: 0.03 SOLUTION RESPIRATORY (INHALATION) at 07:55

## 2018-11-05 RX ADMIN — LEVOTHYROXINE SODIUM 112 MCG: 112 TABLET ORAL at 05:48

## 2018-11-05 RX ADMIN — LEVALBUTEROL HYDROCHLORIDE 1.25 MG: 1.25 SOLUTION, CONCENTRATE RESPIRATORY (INHALATION) at 20:22

## 2018-11-05 RX ADMIN — ACETAMINOPHEN 650 MG: 325 TABLET, FILM COATED ORAL at 19:40

## 2018-11-05 RX ADMIN — PIPERACILLIN SODIUM AND TAZOBACTAM SODIUM 3.38 G: 36; 4.5 INJECTION, POWDER, FOR SOLUTION INTRAVENOUS at 05:47

## 2018-11-05 RX ADMIN — POTASSIUM CHLORIDE 20 MEQ: 200 INJECTION, SOLUTION INTRAVENOUS at 08:50

## 2018-11-05 RX ADMIN — ENOXAPARIN SODIUM 40 MG: 40 INJECTION SUBCUTANEOUS at 08:43

## 2018-11-05 RX ADMIN — GABAPENTIN 300 MG: 300 CAPSULE ORAL at 08:43

## 2018-11-05 RX ADMIN — PIPERACILLIN SODIUM AND TAZOBACTAM SODIUM 3.38 G: 36; 4.5 INJECTION, POWDER, FOR SOLUTION INTRAVENOUS at 16:25

## 2018-11-05 RX ADMIN — GUAIFENESIN 1200 MG: 600 TABLET, EXTENDED RELEASE ORAL at 08:42

## 2018-11-05 RX ADMIN — LEVALBUTEROL HYDROCHLORIDE 1.25 MG: 1.25 SOLUTION, CONCENTRATE RESPIRATORY (INHALATION) at 07:55

## 2018-11-05 RX ADMIN — LEVALBUTEROL HYDROCHLORIDE 1.25 MG: 1.25 SOLUTION, CONCENTRATE RESPIRATORY (INHALATION) at 13:33

## 2018-11-05 RX ADMIN — DOCUSATE SODIUM 100 MG: 100 CAPSULE, LIQUID FILLED ORAL at 08:43

## 2018-11-05 RX ADMIN — DOCUSATE SODIUM 100 MG: 100 CAPSULE, LIQUID FILLED ORAL at 17:21

## 2018-11-05 RX ADMIN — ISODIUM CHLORIDE 3 ML: 0.03 SOLUTION RESPIRATORY (INHALATION) at 13:33

## 2018-11-05 RX ADMIN — GUAIFENESIN 1200 MG: 600 TABLET, EXTENDED RELEASE ORAL at 21:17

## 2018-11-05 RX ADMIN — POTASSIUM CHLORIDE 20 MEQ: 200 INJECTION, SOLUTION INTRAVENOUS at 17:22

## 2018-11-05 RX ADMIN — PANTOPRAZOLE SODIUM 20 MG: 20 TABLET, DELAYED RELEASE ORAL at 05:48

## 2018-11-05 NOTE — PHYSICAL THERAPY NOTE
Physical Therapy Progress Note     11/05/18 1540   Pain Assessment   Pain Assessment No/denies pain   Pain Score No Pain   Restrictions/Precautions   Weight Bearing Precautions Per Order No   Other Precautions Chair Alarm;Telemetry;O2;Fall Risk   General   Chart Reviewed Yes   Family/Caregiver Present Yes  (daughter)   Cognition   Overall Cognitive Status Impaired   Arousal/Participation Alert; Cooperative   Subjective   Subjective Pt denies pain however requests to use the restroom  Daughter present today  Transfers   Sit to Stand 3  Moderate assistance   Additional items Assist x 2;Armrests; Verbal cues   Stand to Sit 4  Minimal assistance   Additional items Assist x 2   Stand pivot 3  Moderate assistance   Additional items Assist x 2   Toilet transfer 3  Moderate assistance   Additional items Assist x 2   Ambulation/Elevation   Gait pattern Narrow SUHAS; Improper Weight shift;Decreased foot clearance; Inconsistent valentino; Short stride  (slow)   Gait Assistance 3  Moderate assist   Additional items Assist x 2;Verbal cues; Tactile cues   Assistive Device Rolling walker   Distance 5 feet   Stair Management Assistance Not tested   Balance   Static Sitting Poor +   Dynamic Sitting Poor   Static Standing Poor +   Dynamic Standing Poor   Ambulatory Poor   Endurance Deficit   Endurance Deficit Yes   Endurance Deficit Description fatigue and weakness   Activity Tolerance   Activity Tolerance Patient limited by fatigue   Nurse [address] [place] [address] to see per TONNY FRANKEL   Exercises   Hip Flexion Sitting;25 reps;Bilateral;AAROM   Knee AROM Long Arc Quad Sitting;25 reps;Bilateral;AAROM   Ankle Pumps Sitting;25 reps;AAROM; Bilateral   Assessment   Prognosis Fair   Problem List Decreased strength;Decreased endurance; Impaired balance;Decreased mobility; Decreased coordination;Decreased cognition; Impaired judgement;Decreased safety awareness   Assessment Pt is making slow but steady progress with functional mobility    Leora LANCASTER present to assist with transfer/ambulation into restroom  Poor carryover with safety cues with transfers  Gait is slow and unsteady with the use of a rolling walker  Daughter present today during session  Completed seated exercises as well however required active assistive and cues to complete  Pt would benefit from continued physical therapy to maximize functional mobility and safety  Goals   Patient Goals To get stronger   STG Expiration Date 11/16/18   Treatment Day 1   Plan   Treatment/Interventions Functional transfer training;LE strengthening/ROM; Therapeutic exercise; Endurance training;Cognitive reorientation;Patient/family training;Bed mobility;Gait training;Spoke to nursing   Progress Progressing toward goals   PT Frequency (3-5x/week)   Recommendation   Recommendation (Rehab)   Equipment Recommended Noman Jett  (KARENA)     Osiris Blakely, SHANE

## 2018-11-05 NOTE — SOCIAL WORK
Out of [facility] and [place] level 1 PASRR completed and faxed to [address] [place] [address] at [address] [facility] also sent to preferred SNF's via [facility]

## 2018-11-05 NOTE — ASSESSMENT & PLAN NOTE
No results found for: HGBA1C    Recent Labs      11/04/18   1621  11/04/18   2138  11/05/18   0631  11/05/18   1116   POCGLU  101  98  105  111       Blood Sugar Average: Last 72 hrs:  (P) 466 [id number]   Hold metformin while hospitalized  Sliding scale coverage  Carb controlled diet    Was on basal lantus 5 units but holding as patient's blood sugars are slightly on the low side

## 2018-11-05 NOTE — ASSESSMENT & PLAN NOTE
No results found for: HGBA1C    Recent Labs      11/04/18   0630  11/04/18   1105  11/04/18   1621  11/04/18   2138   POCGLU  95  98  101  98       Blood Sugar Average: Last 72 hrs:  (P) 682 [id number]   Hold metformin while hospitalized  Sliding scale coverage  Carb controlled diet    Was on basel lantus 5 units will stop as sugars on low side  Even though normal will place on iv fluids glucose to elevate bs if she is not eating well

## 2018-11-05 NOTE — ASSESSMENT & PLAN NOTE
Due to sepsis, POA   Treatment as above  Wbc count improving and procal improving  However pt yest seemed more alert was placed in chair yesterday today really lethargic

## 2018-11-05 NOTE — ASSESSMENT & PLAN NOTE
With sepsis  Community-acquired  Admit to inpatient  Started on ceftriaxone and azithromycin, continued for another 24 hrs however to see improvement, pt with improvement with her labs however physically still very lethargic but able to somewhat mumble appropriate responses   Will broaden abx coverage dc azithro and dc ceftriaxone start now on zosyn (discussed curbside with dr Barbara Sanz)   Await blood in process and sputum culture not yet obtained  pneumonia antigens  Negative azithro dcd  Monitor temps, procalcitonin 1 29, repeat this  A m  Was 0 88  , and WBC count, elevated at 28 51 yesterday improved to 17 57  Was on comm acquired pathway will look to transition today to cover aspiration pna discussed with dr Otto Gooden started on zosyn  Supplemental oxygen 2 L nasal cannula  Airway clearance protocol  P r n  Bronchodilators    Mucinex  Feel pt will respond to percussion vest as pt unable to utilized incentive or flutter valve   They are using pt today very lethargic

## 2018-11-05 NOTE — RESTORATIVE TECHNICIAN NOTE
Restorative Specialist Mobility Note       Activity: Ambulate in room, Bathroom privileges, Chair, Dangle (Educated/encouraged pt to ambulate with assistance 3-4 x's/day  Chair alarm on   Pt callbell, phone/tray within reach )     Assistive Device: Other (Comment) (HHA x2 with [place] O2 on 3L)             Wood FUNG, Restorative Technician, [employer]

## 2018-11-05 NOTE — ASSESSMENT & PLAN NOTE
· With sepsis  · Community-acquired  · Will broaden abx coverage dc azithro and dc ceftriaxone start now on zosyn  · Await blood in process and sputum culture not yet obtained   pneumonia antigens  Negative azithro dcd  · Was on comm acquired pathway will look to transition today to cover aspiration pna discussed with dr Shweta Deluca started on zosyn  · Supplemental oxygen 2 L nasal cannula, encourage out of bed and wean oxygen  · Encourage I S  · P r n  Bronchodilators    · Will recheck procalcitonin for tomorrow

## 2018-11-05 NOTE — ASSESSMENT & PLAN NOTE
· Incidentally found on CT scan on admission   · Will need vascular surgery follow-up as an outpatient  · CT:  5 cm infrarenal abdominal aortic aneurysm

## 2018-11-05 NOTE — PLAN OF CARE
Problem: PHYSICAL THERAPY ADULT  Goal: Performs mobility at highest level of function for planned discharge setting  See evaluation for individualized goals  Treatment/Interventions: Functional transfer training, Elevations, Therapeutic exercise, Endurance training, Patient/family training, Bed mobility, Gait training  Equipment Recommended: Lisandra Howell       See flowsheet documentation for full assessment, interventions and recommendations  Outcome: Progressing  Prognosis: Fair  Problem List: Decreased strength, Decreased endurance, Impaired balance, Decreased mobility, Decreased coordination, Decreased cognition, Impaired judgement, Decreased safety awareness  Assessment: Pt is making slow but steady progress with functional mobility  Leora LANCASTER present to assist with transfer/ambulation into restroom  Poor carryover with safety cues with transfers  Gait is slow and unsteady with the use of a rolling walker  Daughter present today during session  Completed seated exercises as well however required active assistive and cues to complete  Pt would benefit from continued physical therapy to maximize functional mobility and safety  Barriers to Discharge: Inaccessible home environment, Decreased caregiver support (daughter states that her sister is becoming overwhelmed)  Barriers to Discharge Comments: pt lives with daughter and granddaughter but, per family, has had a recent decline in health; family feels that they need help to provide proper care - PT informed case management of situation for follow up  Recommendation:  (Rehab)     PT - OK to Discharge: Yes (to IP rehabilitation when medically stable)    See flowsheet documentation for full assessment

## 2018-11-05 NOTE — PLAN OF CARE
Problem: OCCUPATIONAL THERAPY ADULT  Goal: Performs self-care activities at highest level of function for planned discharge setting  See evaluation for individualized goals  Treatment Interventions: ADL retraining, Functional transfer training, UE strengthening/ROM, Endurance training, Cognitive reorientation, Patient/family training, Compensatory technique education, Continued evaluation, Activityengagement  Equipment Recommended: Bedside commode, Tub seat with back       See flowsheet documentation for full assessment, interventions and recommendations  Outcome: Progressing  Limitation: Decreased ADL status, Decreased UE strength, Decreased Safe judgement during ADL, Decreased cognition, Decreased endurance, Visual deficit, Decreased fine motor control, Decreased high-level ADLs, Decreased self-care trans  Prognosis: Fair  Assessment: Patient participated in Skilled OT session this date with interventions consisting of sit to stand transfers, standing tolerance, LB dressing, self feeding, safety and energy conservation techinques   Patient agreeable to OT treatment session, upon arrival patient was found seated OOB to Chair  In comparison to previous session, patient with improvements in LB dressing, self feeding, and sit-stand transfers with RW,  Upgraded goals 1  Mod I self feeding,2  set-up grooming, 3  Min a x 1 transfers with AD, 4  min a x 1 short distance functional mobility with RW   Patient requiring verbal cues for safety, verbal cues for correct technique and verbal cues for pacing thru activity steps  Patient continues to be functioning below baseline level, occupational performance remains limited secondary to factors listed above and increased risk for falls and injury  From OT standpoint, recommendation at time of d/c would be Short Term Rehab     Patient to benefit from continued Occupational Therapy treatment while in the hospital to address deficits as defined above and maximize level of functional independence with ADLs and functional mobility        OT Discharge Recommendation: Short Term Rehab  OT - OK to Discharge:  (yes when medically stable to rehab)

## 2018-11-05 NOTE — SPEECH THERAPY NOTE
Speech/Language Pathology Progress Note    Patient Name: Julianne Castleman  Today's Date: 11/5/2018     Subjective:  "I'm glad she can have her water" per dtr Tejas Hsu  Objective:  Dysphagia tx completed bedside p review of records  Dtr Tejas Hsu present  Pt was alert & positioned fully upright  She was oriented to self, dtr, not place, time  She was able to follow simple commands today and most speech was intelligible (minimal non-sensical speech) in very simple conversation  Pt was assessed c pureed & soft food (small bites of soft cookie), as well as thick and thin liquid  Oral processing was min slowed c pureed but mod prolonged c soft huitron (c pt beginnign to fall asleep with food in her mouth after 4th/final bite)  Pt able to take (thick and thin) liquids from cup and (thin by) straw  Oral control appeared grossly adequate  Bolus transfer was complete c puree/liquids, but piecemeal transfer and mild oral residue noted c cookie  Pt "washed down" cookie residue c cough resulting  No other indications of laryngeal penetration or aspiration risk c other materials (eg no cough, throat clearing, change in vocal quality or BS c puree, thick or thin liquid)  Assessment:  MS and tolerance for thin liquid improving  Plan/Recommendations:  Consider pureed diet c THIN liquids, continue medications crushed (spoke c AMERICA re: recommendations)  Continue assistance &V supervision c meals

## 2018-11-05 NOTE — PLAN OF CARE
Problem: SLP ADULT - SWALLOWING, IMPAIRED  Goal: Initial SLP swallow eval performed  Outcome: Completed Date Met: 11/02/18

## 2018-11-05 NOTE — PROGRESS NOTES
Progress Note - Renetta Limon 10/31/1929, 80 y o  female MRN: [id number]    [facility] Encounter: [id number]    Primary Care Provider: No primary care provider on file  Date and time admitted to hospital: 11/1/2018 12:15 PM        * Left upper lobe pneumonia ([facility] [address] )   Assessment & Plan    · With sepsis  · Community-acquired  · Will broaden abx coverage dc azithro and dc ceftriaxone start now on zosyn  · Await blood in process and sputum culture not yet obtained   pneumonia antigens  Negative azithro dcd  · Was on comm acquired pathway will look to transition today to cover aspiration pna discussed with dr Krystin Torres started on zosyn  · Supplemental oxygen 2 L nasal cannula, encourage out of bed and wean oxygen  · Encourage I S  · P r n  Bronchodilators  · Will recheck procalcitonin for tomorrow       Coronary artery disease of native artery of native heart with stable angina pectoris ([facility] [address] )   Assessment & Plan    · Continue statin  · P r n  Nitroglycerin  · Stable     Abdominal aortic aneurysm (AAA) 3 0 cm to 5 0 cm in diameter in female [facility])   Assessment & Plan    · Incidentally found on CT scan on admission   · Will need vascular surgery follow-up as an outpatient  · CT:  5 cm infrarenal abdominal aortic aneurysm  Toxic metabolic encephalopathy   Assessment & Plan    · Due to sepsis, POA   · Treatment as above  · Wbc count improving and procalcitonin improving       Type 2 diabetes mellitus without complication, without long-term current use of insulin [facility])   Assessment & Plan    No results found for: HGBA1C    Recent Labs      11/04/18   1621  11/04/18   2138  11/05/18   0631  11/05/18   1116   POCGLU  101  98  105  111       Blood Sugar Average: Last 72 hrs:  (P) 099 [id number]   Hold metformin while hospitalized  Sliding scale coverage  Carb controlled diet    Was on basal lantus 5 units but holding as patient's blood sugars are slightly on the low side         VTE Pharmacologic Prophylaxis:   Pharmacologic: Enoxaparin (Lovenox)  Mechanical VTE Prophylaxis in Place: Yes    Patient Centered Rounds: I have performed bedside rounds with nursing staff today  Discussions with Specialists or Other Care Team Provider:  Primary RN and     Education and Discussions with Family / Patient:  Patient and her daughter    Time Spent for Care: 30 minutes  More than 50% of total time spent on counseling and coordination of care as described above  Current Length of Stay: 4 day(s)    Current Patient Status: Inpatient   Certification Statement: The patient will continue to require additional inpatient hospital stay due to Community acquired pneumonia and possible aspiration pneumonia with sepsis  Discharge Plan / Estimated Discharge Date:  Possibly within the next 24-48 hours      Code Status: Level 3 - DNAR and DNI      Subjective:   Feels better today  Offers no complaints of chest pain or shortness of breath  No nausea vomiting diarrhea constipation and her daughter confirms that she is much more awake today  She is agreeable to rehab  She was still weak today  Objective:     Vitals:   Temp (24hrs), Av 4 °F (36 9 °C), Min:97 6 °F (36 4 °C), Max:99 1 °F (37 3 °C)    Temp:  [97 6 °F (36 4 °C)-99 1 °F (37 3 °C)] 97 6 °F (36 4 °C)  HR:  [] 99  Resp:  [18] 18  BP: (115-153)/(57-74) 144/65  SpO2:  [92 %-99 %] 93 %  Body mass index is 23 1 kg/m²  Input and Output Summary (last 24 hours): Intake/Output Summary (Last 24 hours) at 18 1527  Last data filed at 18 1100   Gross per 24 hour   Intake          1173 33 ml   Output              146 ml   Net          1027 33 ml       Physical Exam:     Physical Exam   Constitutional: She is oriented to person, place, and time  She appears well-nourished  No distress  HENT:   Head: Normocephalic  Eyes: Pupils are equal, round, and reactive to light  Neck: Normal range of motion  Neck supple     Cardiovascular: Normal rate and normal heart sounds  No murmur heard  Pulmonary/Chest: Effort normal and breath sounds normal    Abdominal: Soft  Bowel sounds are normal    Musculoskeletal: Normal range of motion  Neurological: She is alert and oriented to person, place, and time  Skin: Skin is warm and dry  Psychiatric: She has a normal mood and affect  Her behavior is normal  Thought content normal    Nursing note and vitals reviewed  Additional Data:     Labs:      Results from last 7 days  Lab Units 11/05/18  0555   WBC Thousand/uL 13 96*   HEMOGLOBIN g/dL 11 3*   HEMATOCRIT % 35 6   PLATELETS Thousands/uL 336   NEUTROS PCT % 76*   LYMPHS PCT % 12*   MONOS PCT % 8   EOS PCT % 2       Results from last 7 days  Lab Units 11/05/18  0555  11/03/18  1228   POTASSIUM mmol/L 3 8  < > 2 4*   CHLORIDE mmol/L 101  < > 110*   CO2 mmol/L 29  < > 21   BUN mg/dL 10  < > 7   CREATININE mg/dL 0 47*  < > 0 24*   CALCIUM mg/dL 8 9  < > 6 3*   ALK PHOS U/L  --   --  66   ALT U/L  --   --  10*   AST U/L  --   --  10   < > = values in this interval not displayed  Results from last 7 days  Lab Units 11/01/18  1233   INR  1 01       * I Have Reviewed All Lab Data Listed Above  Recent Cultures (last 7 days):       Results from last 7 days  Lab Units 11/02/18  0834 11/01/18  1234 11/01/18  1233   BLOOD CULTURE   --  No Growth After 4 Days  No Growth After 4 Days     LEGIONELLA URINARY ANTIGEN  Negative  --   --        Last 24 Hours Medication List:     Current Facility-Administered Medications:  acetaminophen 650 mg Oral Once Genna Luciano MD    acetaminophen 650 mg Oral Q6H PRN Mariusz Bo MD    albuterol 2 5 mg Nebulization Q4H PRN Marco Goodman MD    atorvastatin 20 mg Oral Daily Mariusz Bo MD    dextrose 5 % and sodium chloride 0 9 % 50 mL/hr Intravenous Continuous AMERICA Eugene Last Rate: 50 mL/hr (11/04/18 1635)   docusate sodium 100 mg Oral BID Mariusz Bo MD    DULoxetine 20 mg Oral Daily Mireille Alves Chanell Carias MD    enoxaparin 40 mg Subcutaneous Daily Bee Ochoa MD    gabapentin 300 mg Oral Daily Bee Ochoa MD    guaiFENesin 1,200 mg Oral Q12H Salvador Jordan MD    insulin lispro 1-5 Units Subcutaneous TID AC Bee Ochoa MD    levalbuterol 1 25 mg Nebulization TID Janny Mclaughlin MD    levothyroxine 112 mcg Oral Daily Bee Ochoa MD    LORazepam 0 5 mg Oral Q8H PRN Bee Ochoa MD    montelukast 10 mg Oral HS Bee Ochoa MD    pantoprazole 20 mg Oral Early Morning Bee Ochoa MD    piperacillin-tazobactam 3 375 g Intravenous Q6H AMERICA Stokes Last Rate: 3 375 g (11/05/18 1152)   pneumococcal 13-valent conjugate vaccine 0 5 mL Intramuscular Prior to discharge Janny Mclaughlin MD    potassium chloride 20 mEq Intravenous BID AMERICA Stokes Last Rate: 20 mEq (11/05/18 0850)   sodium chloride 3 mL Nebulization TID Janny Mclaughlin MD         Today, Patient Was Seen By: AMERICA Garg    ** Please Note: Dragon 360 Dictation voice to text software may have been used in the creation of this document   **

## 2018-11-05 NOTE — PROGRESS NOTES
Progress Note - Gilbert Harper 10/31/1929, 80 y o  female MRN: [id number]    Unit/Bed#: [facility] 725-01 Encounter: [id number]    Primary Care Provider: No primary care provider on file  Date and time admitted to hospital: 11/1/2018 12:15 PM        * Left upper lobe pneumonia [facility])   Assessment & Plan    With sepsis  Community-acquired  Admit to inpatient  Started on ceftriaxone and azithromycin, continued for another 24 hrs however to see improvement, pt with improvement with her labs however physically still very lethargic but able to somewhat mumble appropriate responses   Will broaden abx coverage dc azithro and dc ceftriaxone start now on zosyn (discussed curbside with dr Sana Cardoso)   Await blood in process and sputum culture not yet obtained  pneumonia antigens  Negative azithro dcd  Monitor temps, procalcitonin 1 29, repeat this  A m  Was 0 88  , and WBC count, elevated at 28 51 yesterday improved to 17 57  Was on comm acquired pathway will look to transition today to cover aspiration pna discussed with dr Charito Guthrie started on zosyn  Supplemental oxygen 2 L nasal cannula  Airway clearance protocol  P r n  Bronchodilators  Mucinex  Feel pt will respond to percussion vest as pt unable to utilized incentive or flutter valve   They are using pt today very lethargic     Toxic metabolic encephalopathy   Assessment & Plan    Due to sepsis, POA   Treatment as above  Wbc count improving and procal improving  However pt yest seemed more alert was placed in chair yesterday today really lethargic      Abdominal aortic aneurysm (AAA) 3 0 cm to 5 0 cm in diameter in female [facility])   Assessment & Plan    Incidentally found on CT scan on admission   Will need vascular surgery follow-up as an outpatient  CT:  5 cm infrarenal abdominal aortic aneurysm  Intermittent asthma   Assessment & Plan    Appears stable at this time    Continue Singulair and bronchodilators, since pt with pneumonia      HLD (hyperlipidemia)   Assessment & Plan    Continue statin     Hypothyroidism   Assessment & Plan    Continue levothyroxine     Coronary artery disease of native artery of native heart with stable angina pectoris [facility])   Assessment & Plan    Continue statin  P r n  Nitroglycerin  At this time due to encephalopathy unable to gather further information regarding cardiac disease  Type 2 diabetes mellitus without complication, without long-term current use of insulin [facility])   Assessment & Plan    No results found for: HGBA1C    Recent Labs      11/04/18   0630  11/04/18   1105  11/04/18   1621  11/04/18   2138   POCGLU  95  98  101  98       Blood Sugar Average: Last 72 hrs:  (P) 098 [id number]   Hold metformin while hospitalized  Sliding scale coverage  Carb controlled diet  Was on basel lantus 5 units will stop as sugars on low side  Even though normal will place on iv fluids glucose to elevate bs if she is not eating well          VTE Pharmacologic Prophylaxis:   Pharmacologic: Enoxaparin (Lovenox)  Mechanical VTE Prophylaxis in Place: Yes    Patient Centered Rounds: I have performed bedside rounds with nursing staff today  Discussions with Specialists or Other Care Team Provider: nursing     Education and Discussions with Family / Patient: patient and daughter at the bedside today    Time Spent for Care: 45 minutes  More than 50% of total time spent on counseling and coordination of care as described above  Current Length of Stay: 3 day(s)    Current Patient Status: Inpatient   Certification Statement: The patient will continue to require additional inpatient hospital stay due to ongoing iv abx with little improvment on physical eval     Discharge Plan: to rehab     Code Status: Level 3 - DNAR and DNI      Subjective:   Pt is very lethargic more so than yesterday am  (however yest by 11am pt was awake ate lunch sat in the chair doing well per nursing and pts daughter)  However to day really lethargic   Discuss with daughter would like to see more improvement however when pt asked questions she mumbles but is appropriate and alert  Objective:     Vitals:   Temp (24hrs), Av 6 °F (37 °C), Min:98 3 °F (36 8 °C), Max:99 1 °F (37 3 °C)    Temp:  [98 3 °F (36 8 °C)-99 1 °F (37 3 °C)] 99 1 °F (37 3 °C)  HR:  [] 95  Resp:  [18] 18  BP: (115-165)/(57-81) 144/69  SpO2:  [92 %-95 %] 95 %  Body mass index is 23 1 kg/m²  Input and Output Summary (last 24 hours): Intake/Output Summary (Last 24 hours) at 18 2348  Last data filed at 18 1839   Gross per 24 hour   Intake           363 33 ml   Output              552 ml   Net          -188 67 ml       Physical Exam:     Physical Exam   Constitutional: She is oriented to person, place, and time  She appears well-developed  No distress  HENT:   Head: Normocephalic  Mouth/Throat: No oropharyngeal exudate  Eyes: Conjunctivae are normal  Right eye exhibits no discharge  Left eye exhibits no discharge  No scleral icterus  Neck: No JVD present  No tracheal deviation present  No thyromegaly present  Cardiovascular: Normal rate  Exam reveals no gallop and no friction rub  No murmur heard  Pulmonary/Chest: No stridor  No respiratory distress  She has no wheezes  She has no rales  She exhibits no tenderness  Poor effort cough with inspiration    Abdominal: She exhibits no distension and no mass  There is no tenderness  There is no rebound and no guarding  Musculoskeletal: She exhibits no edema, tenderness or deformity  Lymphadenopathy:     She has no cervical adenopathy  Neurological: She is oriented to person, place, and time  Skin: No rash noted  She is not diaphoretic  No erythema  No pallor  Psychiatric: She has a normal mood and affect           Additional Data:     Labs:      Results from last 7 days  Lab Units 18  0518  18  1233   WBC Thousand/uL 17 57*  < > 29 82*   HEMOGLOBIN g/dL 12 3  < > 12 6   HEMATOCRIT % 39 2  < > 40 1   PLATELETS Thousands/uL 332  < > 300   BANDS PCT %  --   --  6   NEUTROS PCT % 82*  < >  --    LYMPHS PCT % 9*  < >  --    LYMPHO PCT %  --   --  3*   MONOS PCT % 5  < >  --    MONO PCT %  --   --  4   EOS PCT % 1  < > 0   < > = values in this interval not displayed  Results from last 7 days  Lab Units 11/04/18  0518  11/03/18  1228   POTASSIUM mmol/L 3 8  < > 2 4*   CHLORIDE mmol/L 100  < > 110*   CO2 mmol/L 29  < > 21   BUN mg/dL 10  < > 7   CREATININE mg/dL 0 46*  < > 0 24*   ANION GAP mmol/L 5  < > 10   CALCIUM mg/dL 9 0  < > 6 3*   ALBUMIN g/dL  --   --  1 6*   TOTAL BILIRUBIN mg/dL  --   --  0 31   ALK PHOS U/L  --   --  66   ALT U/L  --   --  10*   AST U/L  --   --  10   < > = values in this interval not displayed  Results from last 7 days  Lab Units 11/01/18  1233   INR  1 01       Results from last 7 days  Lab Units 11/04/18  2138 11/04/18  1621 11/04/18  1105 11/04/18  0630 11/03/18  2134 11/03/18  1644 11/03/18  1109 11/03/18  0645 11/02/18  2144 11/02/18  1643 11/02/18  1113 11/02/18  0626   POC GLUCOSE mg/dl 98 101 98 95 160* 109 97 85 106 99 126 117           Results from last 7 days  Lab Units 11/04/18  1727 11/03/18  0621 11/01/18  2206 11/01/18  1233   LACTIC ACID mmol/L  --   --   --  1 5   PROCALCITONIN ng/ml 0 28* 0 88* 1 29*  --            * I Have Reviewed All Lab Data Listed Above  * Additional Pertinent Lab Tests Reviewed: All Labs Within Last 24 Hours Reviewed    Imaging:    Imaging Reports Reviewed Today Include: reviewed     Recent Cultures (last 7 days):       Results from last 7 days  Lab Units 11/02/18  0834 11/01/18  1234 11/01/18  1233   BLOOD CULTURE   --  No Growth at 72 hrs  No Growth at 72 hrs     LEGIONELLA URINARY ANTIGEN  Negative  --   --        Last 24 Hours Medication List:     Current Facility-Administered Medications:  acetaminophen 650 mg Oral Once Angelina Tenorio MD    acetaminophen 650 mg Oral Q6H PRN Neena Nelson MD    albuterol 2 5 mg Nebulization Q4H PRN Burnbess Justen Tonie Farrell MD    atorvastatin 20 mg Oral Daily Nano Pérez MD    dextrose 5 % and sodium chloride 0 9 % 50 mL/hr Intravenous Continuous AMERICA Sanderson Last Rate: 50 mL/hr (11/04/18 1635)   docusate sodium 100 mg Oral BID Nano Pérez MD    DULoxetine 20 mg Oral Daily Nano Pérez MD    enoxaparin 40 mg Subcutaneous Daily Nano Pérez MD    gabapentin 300 mg Oral Daily Nano Pérez MD    guaiFENesin 1,200 mg Oral Q12H Fabi Mono MD    insulin lispro 1-5 Units Subcutaneous TID AC Nano Pérez MD    levalbuterol 1 25 mg Nebulization TID Agustin Oakley MD    levothyroxine 112 mcg Oral Daily Nano Pérez MD    LORazepam 0 5 mg Oral Q8H PRN Nano Pérez MD    montelukast 10 mg Oral HS Nano Pérez MD    pantoprazole 20 mg Oral Early Morning Nano Pérez MD    piperacillin-tazobactam 3 375 g Intravenous Q6H AMERICA Sanderson Last Rate: 3 375 g (11/04/18 2230)   pneumococcal 13-valent conjugate vaccine 0 5 mL Intramuscular Prior to discharge Agustin Oakley MD    potassium chloride 20 mEq Intravenous BID AMERICA Sanderson Last Rate: 20 mEq (11/04/18 1744)   sodium chloride 3 mL Nebulization TID Agustin Oakley MD         Today, Patient Was Seen By: AMERICA Sanderson    ** Please Note: Dictation voice to text software may have been used in the creation of this document   **

## 2018-11-05 NOTE — OCCUPATIONAL THERAPY NOTE
[address] Progress Note     Patient Name: Ivania Blocker  Today's Date: 11/5/2018  Problem List  Patient Active Problem List   Diagnosis    Left upper lobe pneumonia ([facility] )    Type 2 diabetes mellitus without complication, without long-term current use of insulin ([facility] )    Coronary artery disease of native artery of native heart with stable angina pectoris ([facility] [address] )    Intermittent asthma    Abdominal aortic aneurysm (AAA) 3 0 cm to 5 0 cm in diameter in female [facility])    Hypothyroidism    HLD (hyperlipidemia)    Toxic metabolic encephalopathy           11/05/18 1701   Restrictions/Precautions   Weight Bearing Precautions Per Order No   Other Precautions Telemetry;O2;Fall Risk;Bed Alarm; Chair Alarm;Cognitive   Lifestyle   Autonomy Per daugther (who doesnt with) pt was able to sponge bath with S, assistance with showering , S dressing, ambulated with RW occassionaly in house  Reciprocal Relationships 2 daughters, granddaughter   Service to Others retired   Intrinsic Gratification Kavita/[facility]   Pain Assessment   Pain Assessment No/denies pain   Pain Score No Pain   ADL   Where Assessed Chair   Eating Assistance 5  Supervision/Setup   Eating Deficit Setup   LB Dressing Assistance 5  Supervision/Setup   LB Dressing Deficit Setup; Increased time to complete;Supervision/safety; Don/doff R sock; Don/doff L sock  (crossed over leg & trunk flexion)   LB Dressing Comments verbal cues for safety/energy conservation techniques   Functional Standing Tolerance   Time approx 1 minute   Activity static standing with BUE support on RW   Comments 2nd trial  approx~15 seconds standing tolerance   Transfers   Sit to Stand 3  Moderate assistance   Additional items Assist x 1;Verbal cues  (safe hand placement)   Stand to Sit 4  Minimal assistance   Additional items Assist x 1;Verbal cues  (safe hand placement )   Other 3  Moderate assistance   Additional items Assist x 1;Verbal cues   Additional Comments +1 step forward/backward for standing balance without BLE support on chair  (pt poor static standing balance with posterior lean )   Cognition   Arousal/Participation Alert; Cooperative   Attention Difficulty dividing attention   Orientation Level Oriented to place;Oriented to person   Memory (pt able to recall current timeline events prior to admission)   Following Commands Follows multistep commands with increased time or repetition   Comments pt improving cognition able to recall biographical information/timeline prior to hospitalization  Activity Tolerance   Activity Tolerance Patient limited by fatigue;Patient tolerated treatment well   Medical Staff Made Aware RN yes medically cleared pt for therapy   Assessment   Assessment Patient participated in Skilled OT session this date with interventions consisting of sit to stand transfers, standing tolerance, LB dressing, self feeding, safety and energy conservation techinques   Patient agreeable to OT treatment session, upon arrival patient was found seated OOB to Chair  In comparison to previous session, patient with improvements in LB dressing, self feeding, and sit-stand transfers with RW,  Upgraded goals 1  Mod I self feeding,2  set-up grooming, 3  Min a x 1 transfers with AD, 4  min a x 1 short distance functional mobility with RW   Patient requiring verbal cues for safety, verbal cues for correct technique and verbal cues for pacing thru activity steps  Patient continues to be functioning below baseline level, occupational performance remains limited secondary to factors listed above and increased risk for falls and injury  From OT standpoint, recommendation at time of d/c would be Short Term Rehab  Patient to benefit from continued Occupational Therapy treatment while in the hospital to address deficits as defined above and maximize level of functional independence with ADLs and functional mobility      Plan   Treatment Interventions ADL retraining;Functional transfer training;UE strengthening/ROM; Patient/family training; Compensatory technique education; Activityengagement; Energy conservation   Goal Expiration Date 11/16/18   OT Frequency 3-5x/wk   Recommendation   OT Discharge Recommendation Short Term Rehab   OT - OK to Discharge (yes when medically stable to rehab)   Barthel Index   Feeding 5   Bathing 0   Grooming Score 5   Dressing Score 5   Bladder Score 0   Bowels Score 5   Toilet Use Score 0   Transfers (Bed/Chair) Score 5   Mobility (Level Surface) Score 0   Stairs Score 0   Barthel Index Score 25     Jennifer Covert, OT

## 2018-11-06 LAB
BACTERIA BLD CULT: NORMAL
BACTERIA BLD CULT: NORMAL
GLUCOSE SERPL-MCNC: 155 MG/DL (ref 65–140)
GLUCOSE SERPL-MCNC: 94 MG/DL (ref 65–140)
GLUCOSE SERPL-MCNC: 97 MG/DL (ref 65–140)
GLUCOSE SERPL-MCNC: 99 MG/DL (ref 65–140)
PROCALCITONIN SERPL-MCNC: 0.09 NG/ML

## 2018-11-06 PROCEDURE — 84145 PROCALCITONIN (PCT): CPT | Performed by: NURSE PRACTITIONER

## 2018-11-06 PROCEDURE — 94760 N-INVAS EAR/PLS OXIMETRY 1: CPT

## 2018-11-06 PROCEDURE — 97530 THERAPEUTIC ACTIVITIES: CPT

## 2018-11-06 PROCEDURE — 99233 SBSQ HOSP IP/OBS HIGH 50: CPT | Performed by: NURSE PRACTITIONER

## 2018-11-06 PROCEDURE — 82948 REAGENT STRIP/BLOOD GLUCOSE: CPT

## 2018-11-06 PROCEDURE — 94640 AIRWAY INHALATION TREATMENT: CPT

## 2018-11-06 PROCEDURE — 92526 ORAL FUNCTION THERAPY: CPT

## 2018-11-06 PROCEDURE — 97535 SELF CARE MNGMENT TRAINING: CPT

## 2018-11-06 PROCEDURE — 97116 GAIT TRAINING THERAPY: CPT

## 2018-11-06 RX ADMIN — DOCUSATE SODIUM 100 MG: 100 CAPSULE, LIQUID FILLED ORAL at 17:21

## 2018-11-06 RX ADMIN — DULOXETINE HYDROCHLORIDE 20 MG: 20 CAPSULE, DELAYED RELEASE ORAL at 07:39

## 2018-11-06 RX ADMIN — LEVALBUTEROL HYDROCHLORIDE 1.25 MG: 1.25 SOLUTION, CONCENTRATE RESPIRATORY (INHALATION) at 20:11

## 2018-11-06 RX ADMIN — ISODIUM CHLORIDE 3 ML: 0.03 SOLUTION RESPIRATORY (INHALATION) at 14:11

## 2018-11-06 RX ADMIN — POTASSIUM CHLORIDE 20 MEQ: 200 INJECTION, SOLUTION INTRAVENOUS at 18:07

## 2018-11-06 RX ADMIN — PIPERACILLIN SODIUM AND TAZOBACTAM SODIUM 3.38 G: 36; 4.5 INJECTION, POWDER, FOR SOLUTION INTRAVENOUS at 22:54

## 2018-11-06 RX ADMIN — ENOXAPARIN SODIUM 40 MG: 40 INJECTION SUBCUTANEOUS at 07:39

## 2018-11-06 RX ADMIN — MONTELUKAST SODIUM 10 MG: 10 TABLET, FILM COATED ORAL at 21:17

## 2018-11-06 RX ADMIN — DOCUSATE SODIUM 100 MG: 100 CAPSULE, LIQUID FILLED ORAL at 07:41

## 2018-11-06 RX ADMIN — PIPERACILLIN SODIUM AND TAZOBACTAM SODIUM 3.38 G: 36; 4.5 INJECTION, POWDER, FOR SOLUTION INTRAVENOUS at 05:07

## 2018-11-06 RX ADMIN — GABAPENTIN 300 MG: 300 CAPSULE ORAL at 07:40

## 2018-11-06 RX ADMIN — GUAIFENESIN 1200 MG: 600 TABLET, EXTENDED RELEASE ORAL at 07:40

## 2018-11-06 RX ADMIN — GUAIFENESIN 1200 MG: 600 TABLET, EXTENDED RELEASE ORAL at 21:17

## 2018-11-06 RX ADMIN — LEVOTHYROXINE SODIUM 112 MCG: 112 TABLET ORAL at 06:33

## 2018-11-06 RX ADMIN — ISODIUM CHLORIDE 3 ML: 0.03 SOLUTION RESPIRATORY (INHALATION) at 08:54

## 2018-11-06 RX ADMIN — PIPERACILLIN SODIUM AND TAZOBACTAM SODIUM 3.38 G: 36; 4.5 INJECTION, POWDER, FOR SOLUTION INTRAVENOUS at 17:21

## 2018-11-06 RX ADMIN — LEVALBUTEROL HYDROCHLORIDE 1.25 MG: 1.25 SOLUTION, CONCENTRATE RESPIRATORY (INHALATION) at 08:54

## 2018-11-06 RX ADMIN — LEVALBUTEROL HYDROCHLORIDE 1.25 MG: 1.25 SOLUTION, CONCENTRATE RESPIRATORY (INHALATION) at 14:11

## 2018-11-06 RX ADMIN — PIPERACILLIN SODIUM AND TAZOBACTAM SODIUM 3.38 G: 36; 4.5 INJECTION, POWDER, FOR SOLUTION INTRAVENOUS at 10:13

## 2018-11-06 RX ADMIN — ISODIUM CHLORIDE 3 ML: 0.03 SOLUTION RESPIRATORY (INHALATION) at 20:11

## 2018-11-06 RX ADMIN — ATORVASTATIN CALCIUM 20 MG: 80 TABLET, FILM COATED ORAL at 07:40

## 2018-11-06 RX ADMIN — PANTOPRAZOLE SODIUM 20 MG: 20 TABLET, DELAYED RELEASE ORAL at 05:07

## 2018-11-06 RX ADMIN — POTASSIUM CHLORIDE 20 MEQ: 200 INJECTION, SOLUTION INTRAVENOUS at 07:37

## 2018-11-06 NOTE — ASSESSMENT & PLAN NOTE
· With sepsis  · Community-acquired  · Will broaden abx coverage dc azithro and dc ceftriaxone start now on zosyn  · Await blood in process and sputum culture not yet obtained   pneumonia antigens  Negative azithro dcd  · Was on comm acquired pathway will look to transition today to cover aspiration pna discussed with dr Mora Art started on zosyn  · Supplemental oxygen 2 L nasal cannula, encourage out of bed and wean oxygen  · Encourage I S  · P r n  Bronchodilators    · Procalcitonin normal today

## 2018-11-06 NOTE — UTILIZATION REVIEW
Continued Stay Review    Date: 18      Vital Signs: Temp (24hrs), Av 4 °F (36 9 °C), Min:97 6 °F (36 4 °C), Max:99 1 °F (37 3 °C)     Temp:  [97 6 °F (36 4 °C)-99 1 °F (37 3 °C)] 97 6 °F (36 4 °C)  HR:  [] 99  Resp:  [18] 18  BP: (115-153)/(57-74) 144/65  SpO2:  [92 %-99 %] 93 %  Body mass index is 23 1 kg/m²  Medications:       acetaminophen 650 mg Oral Once   acetaminophen 650 mg Oral Q6H PRN   albuterol 2 5 mg Nebulization Q4H PRN   atorvastatin 20 mg Oral Daily   dextrose 5 % and sodium chloride 0 9 % 50 mL/hr Intravenous Continuous   docusate sodium 100 mg Oral BID   DULoxetine 20 mg Oral Daily   enoxaparin 40 mg Subcutaneous Daily   gabapentin 300 mg Oral Daily   guaiFENesin 1,200 mg Oral Q12H CHERYL   insulin lispro 1-5 Units Subcutaneous TID AC   levalbuterol 1 25 mg Nebulization TID   levothyroxine 112 mcg Oral Daily   LORazepam 0 5 mg Oral Q8H PRN   montelukast 10 mg Oral HS   pantoprazole 20 mg Oral Early Morning   piperacillin-tazobactam 3 375 g Intravenous Q6H   pneumococcal 13-valent conjugate vaccine 0 5 mL Intramuscular Prior to discharge   potassium chloride 20 mEq Intravenous BID   sodium chloride 3 mL Nebulization TID          Abnormal Labs/Diagnostic Results:     Lab Units 18  0555   WBC Thousand/uL 13 96*   HEMOGLOBIN g/dL 11 3*   NEUTROS PCT % 76*   LYMPHS PCT % 12*           Age/Sex: 80 y o  female     Assessment/Plan:       Left upper lobe pneumonia (HCC)   Assessment & Plan     · With sepsis  · Community-acquired  · Will broaden abx coverage dc azithro and dc ceftriaxone start now on zosyn  · Await blood in process and sputum culture not yet obtained               pneumonia antigens   Negative azithro dcd  · Was on comm acquired pathway will look to transition today to cover aspiration pna discussed with dr Zac Kern started on zosyn  · Supplemental oxygen 2 L nasal cannula, encourage out of bed and wean oxygen      The patient will continue to require additional inpatient hospital stay due to Community acquired pneumonia and possible aspiration pneumonia with sepsis      Discharge Plan / Estimated Discharge Date:  Possibly within the next 24-48 hours

## 2018-11-06 NOTE — PROGRESS NOTES
Progress Note - Fernando Friends 10/31/1929, 80 y o  female MRN: [id number]    [facility] Encounter: [id number]    Primary Care Provider: No primary care provider on file  Date and time admitted to hospital: 11/1/2018 12:15 PM        * Left upper lobe pneumonia ([address] )   Assessment & Plan    · With sepsis  · Community-acquired  · Will broaden abx coverage dc azithro and dc ceftriaxone start now on zosyn  · Await blood in process and sputum culture not yet obtained   pneumonia antigens  Negative azithro dcd  · Was on comm acquired pathway will look to transition today to cover aspiration pna discussed with dr Kelsie Centeno started on zosyn  · Supplemental oxygen 2 L nasal cannula, encourage out of bed and wean oxygen  · Encourage I S  · P r n  Bronchodilators  · Procalcitonin normal today      Coronary artery disease of native artery of native heart with stable angina pectoris (HCC)   Assessment & Plan    · Continue statin  · P r n  Nitroglycerin  · Stable     Abdominal aortic aneurysm (AAA) 3 0 cm to 5 0 cm in diameter in female [facility])   Assessment & Plan    · Incidentally found on CT scan on admission   · Will need vascular surgery follow-up as an outpatient  · CT:  5 cm infrarenal abdominal aortic aneurysm  Toxic metabolic encephalopathy   Assessment & Plan    · Due to sepsis, POA   · Treatment as above  · Wbc count improving and procalcitonin improving       Type 2 diabetes mellitus without complication, without long-term current use of insulin [facility])   Assessment & Plan    No results found for: HGBA1C    Recent Labs      11/05/18   1624  11/05/18   2114  11/06/18   0650  11/06/18   1058   POCGLU  106  103  99  94       Blood Sugar Average: Last 72 hrs:  (P) 707 [id number]   Hold metformin while hospitalized  Sliding scale coverage  Carb controlled diet    Was on basal lantus 5 units but holding as patient's blood sugars are slightly on the low side         VTE Pharmacologic Prophylaxis: Pharmacologic: Enoxaparin (Lovenox)  Mechanical VTE Prophylaxis in Place: Yes    Patient Centered Rounds: I have performed bedside rounds with nursing staff today  Discussions with Specialists or Other Care Team Provider:  Primary RN and     Education and Discussions with Family / Patient:  Patient's daughter and granddaughter    Time Spent for Care: 20 minutes  More than 50% of total time spent on counseling and coordination of care as described above  Current Length of Stay: 5 day(s)    Current Patient Status: Inpatient   Certification Statement: The patient will continue to require additional inpatient hospital stay due to Community-acquired pneumonia and sepsis  Discharge Plan / Estimated Discharge Date:  Possibly for discharge to rehab tomorrow      Code Status: Level 3 - DNAR and DNI      Subjective:   Feels better today  No CP or SOB  No fever or chills  Tolerating po well  Walked with physical therapy  Family is still agreeable to rehab    Objective:     Vitals:   Temp (24hrs), Av 7 °F (37 1 °C), Min:98 5 °F (36 9 °C), Max:98 9 °F (37 2 °C)    Temp:  [98 5 °F (36 9 °C)-98 9 °F (37 2 °C)] 98 7 °F (37 1 °C)  HR:  [] 87  Resp:  [18] 18  BP: (121-181)/(60-78) 151/76  SpO2:  [93 %-98 %] 93 %  Body mass index is 23 1 kg/m²  Input and Output Summary (last 24 hours): Intake/Output Summary (Last 24 hours) at 18 1223  Last data filed at 18 1013   Gross per 24 hour   Intake          1045 83 ml   Output              800 ml   Net           245 83 ml       Physical Exam:     Physical Exam   Constitutional: She is oriented to person, place, and time  She appears well-nourished  No distress  HENT:   Head: Normocephalic  Eyes: Pupils are equal, round, and reactive to light  Neck: Normal range of motion  Neck supple  Cardiovascular: Normal rate, regular rhythm and normal heart sounds      Pulmonary/Chest: Effort normal and breath sounds normal  No respiratory distress  She has no wheezes  She has no rales  Abdominal: Soft  Bowel sounds are normal    Musculoskeletal: Normal range of motion  She exhibits no edema  Neurological: She is alert and oriented to person, place, and time  Skin: Skin is warm and dry  Psychiatric: She has a normal mood and affect  Her behavior is normal  Thought content normal    Nursing note and vitals reviewed  Additional Data:     Labs:      Results from last 7 days  Lab Units 11/05/18  0555   WBC Thousand/uL 13 96*   HEMOGLOBIN g/dL 11 3*   HEMATOCRIT % 35 6   PLATELETS Thousands/uL 336   NEUTROS PCT % 76*   LYMPHS PCT % 12*   MONOS PCT % 8   EOS PCT % 2       Results from last 7 days  Lab Units 11/05/18  0555  11/03/18  1228   POTASSIUM mmol/L 3 8  < > 2 4*   CHLORIDE mmol/L 101  < > 110*   CO2 mmol/L 29  < > 21   BUN mg/dL 10  < > 7   CREATININE mg/dL 0 47*  < > 0 24*   CALCIUM mg/dL 8 9  < > 6 3*   ALK PHOS U/L  --   --  66   ALT U/L  --   --  10*   AST U/L  --   --  10   < > = values in this interval not displayed  Results from last 7 days  Lab Units 11/01/18  1233   INR  1 01       * I Have Reviewed All Lab Data Listed Above  Recent Cultures (last 7 days):       Results from last 7 days  Lab Units 11/02/18  0834 11/01/18  1234 11/01/18  1233   BLOOD CULTURE   --  No Growth After 4 Days  No Growth After 4 Days     LEGIONELLA URINARY ANTIGEN  Negative  --   --        Last 24 Hours Medication List:     Current Facility-Administered Medications:  acetaminophen 650 mg Oral Once Jose Alejandro Chaney MD    acetaminophen 650 mg Oral Q6H PRN Rosalie Villarreal MD    albuterol 2 5 mg Nebulization Q4H PRN Jerson Mansfield MD    atorvastatin 20 mg Oral Daily Rosalie Villarreal MD    dextrose 5 % and sodium chloride 0 9 % 50 mL/hr Intravenous Continuous AMERICA Ibrahim Last Rate: 50 mL/hr (11/04/18 1635)   docusate sodium 100 mg Oral BID Rosalie Villarreal MD    DULoxetine 20 mg Oral Daily Rosalie Villarreal MD    enoxaparin 40 mg Subcutaneous Daily Neena Nelson MD    gabapentin 300 mg Oral Daily Neena Nelson MD    guaiFENesin 1,200 mg Oral Q12H Edgardo Morillo MD    insulin lispro 1-5 Units Subcutaneous TID AC Neena Nelson MD    levalbuterol 1 25 mg Nebulization TID Nancy Carranza MD    levothyroxine 112 mcg Oral Daily Neena Nelson MD    LORazepam 0 5 mg Oral Q8H PRN Neena Nelson MD    montelukast 10 mg Oral HS Neena Nelson MD    pantoprazole 20 mg Oral Early Morning Neena Nelson MD    piperacillin-tazobactam 3 375 g Intravenous Q6H AMERICA Dillon Last Rate: 3 375 g (11/06/18 1013)   pneumococcal 13-valent conjugate vaccine 0 5 mL Intramuscular Prior to discharge Nancy Carranza MD    potassium chloride 20 mEq Intravenous BID AMERICA Dillon Last Rate: 20 mEq (11/06/18 0737)   sodium chloride 3 mL Nebulization TID Nancy Carranza MD         Today, Patient Was Seen By: AMERICA Dumas    ** Please Note: Dragon 360 Dictation voice to text software may have been used in the creation of this document   **

## 2018-11-06 NOTE — OCCUPATIONAL THERAPY NOTE
Occupational Therapy Treatment Note     11/06/18 1244   Restrictions/Precautions   Weight Bearing Precautions Per Order No   Other Precautions Fall Risk;O2;Telemetry; Chair Alarm; Bed Alarm   Lifestyle   Autonomy Per daugther (who doesnt with) pt was able to sponge bath with S, assistance with showering , S dressing, ambulated with RW occassionaly in house  Reciprocal Relationships 2 daughters, granddaughter   Service to Others retired   Intrinsic Gratification [facility]   Pain Assessment   Pain Assessment No/denies pain   Pain Score No Pain   ADL   Where Assessed Chair   Grooming Assistance 4  Minimal Assistance   Grooming Deficit Wash/dry hands; Wash/dry face   Toileting Assistance  3  Moderate Assistance   Toileting Deficit Clothing management up;Clothing management down   Bed Mobility   Sit to Supine 2  Maximal assistance   Additional items Increased time required;LE management   Transfers   Sit to Stand 3  Moderate assistance   Additional items Assist x 1   Stand to Sit 4  Minimal assistance   Additional items Assist x 1   Toilet transfer 4  Minimal assistance   Additional items Assist x 1   Cognition   Overall Cognitive Status Impaired   Arousal/Participation Alert; Cooperative   Attention Attends with cues to redirect   Orientation Level Oriented to person;Oriented to place   Following Commands Follows one step commands with increased time or repetition   Activity Tolerance   Activity Tolerance Patient limited by fatigue;Patient tolerated treatment well   Assessment   Assessment Pt participated in occupational therapy with focus on activity tolerance, functional transfers/mob, toileting/toilet transfers,  bed mob, standing balance and tolerance for pt engagement in functional self-care task/grooming tasks  Pt cleared by TONNY/Leora for pt participation in occupational therapy  Pt received sitting out of bed to bedside chair and agreeable to therapy following pt Identifiers confirmed    Pt reported her goal to go back to bed  Pt required assist for functional transfers/mob with RW and assist for toilet transfers 2* pt decreased overall strength  Pt able to complete toilet hygiene with Set up assist  Pt requires increased time with functional mob with RW 2* pt balance deficits  Pt will require in-pt rehab to continue to address pt noted deficits with decreased strength, coordination, balance and activity tolerance which currently impair pt ADL and functional mob       Plan   Treatment Interventions ADL retraining   Goal Expiration Date 11/16/18   OT Frequency 3-5x/wk   Recommendation   OT Discharge Recommendation Short Term Rehab   Barthel Index   Feeding 5   Bathing 0   Grooming Score 5   Dressing Score 5   Bladder Score 0   Bowels Score 5   Toilet Use Score 0   Transfers (Bed/Chair) Score 5   Mobility (Level Surface) Score 0   Stairs Score 0   Barthel Index Score 25   Modified [place] Scale   Modified [place] Scale 4       Vijay CHAVEZ/CODY

## 2018-11-06 NOTE — SPEECH THERAPY NOTE
Speech/Language Pathology Progress Note    Patient Name: Toño Lundberg  PZKHY'Y Date: 11/6/2018       Subjective:  "I'm so tired" per pt  Per RN, pt with poor sleep last pm     Objective:  Dysphagia tx completed bedside p review of records  Dtr Darlin Martinez not present this pm   Pt was alert & positioned upright in chair She was oriented to self, dtr, not place, time  She was able to follow simple commands today and most speech was intelligible (minimal non-sensical speech) in very simple conversation      Pt was assessed c pureed food (she declined soft food today as she was "full"/had eaten 50% of pureed tray prior to my arrival), as well as  thin liquid/coffee  Oral processing was timely and efficient c puree  Pt able to take thin liquids by cup and straw  Oral control appeared adequate  No indications of laryngeal penetration or aspiration (eg no cough, throat clearing, change in vocal quality or BS) c puree or thin liquid  Spoke c RN p session  Pt continues to take most medications crushed (except colace which she manipulated for prolonged periods of time intermittently)        Plan/Recommendations: Continue pureed diet c thin liquids, continue medications as tolerated  Continue assistance & supervision c meals

## 2018-11-06 NOTE — ASSESSMENT & PLAN NOTE
No results found for: HGBA1C    Recent Labs      11/05/18   1624  11/05/18   2114  11/06/18   0650  11/06/18   1058   POCGLU  106  103  99  94       Blood Sugar Average: Last 72 hrs:  (P) 133 [id number]   Hold metformin while hospitalized  Sliding scale coverage  Carb controlled diet    Was on basal lantus 5 units but holding as patient's blood sugars are slightly on the low side

## 2018-11-06 NOTE — PLAN OF CARE
Problem: PHYSICAL THERAPY ADULT  Goal: Performs mobility at highest level of function for planned discharge setting  See evaluation for individualized goals  Treatment/Interventions: Functional transfer training, Elevations, Therapeutic exercise, Endurance training, Patient/family training, Bed mobility, Gait training  Equipment Recommended: Fernie Cordero       See flowsheet documentation for full assessment, interventions and recommendations  Outcome: Progressing  Prognosis: Fair  Problem List: Decreased strength, Decreased endurance, Impaired balance, Decreased mobility, Decreased coordination, Decreased cognition, Impaired judgement, Decreased safety awareness  Assessment: Pt is making steady progress with functional mobility  Daughter present during session  With encouragement agrees to participate  Limited by weakness and confusion  Rehab aide present to assist with mobility as well as transfer on/off toilet  Slow with unsteady gait today requiring cues for base of support and safety  Chair follow required as well  Sp O2 3L 93%  Declined exercises due to fatigue  Pt would benefit from continued physical therapy to maximize functional mobility and safety  Barriers to Discharge: Inaccessible home environment, Decreased caregiver support (daughter states that her sister is becoming overwhelmed)  Barriers to Discharge Comments: pt lives with daughter and granddaughter but, per family, has had a recent decline in health; family feels that they need help to provide proper care - PT informed case management of situation for follow up  Recommendation:  (Rehab)     PT - OK to Discharge: Yes (to IP rehabilitation when medically stable)    See flowsheet documentation for full assessment

## 2018-11-06 NOTE — PLAN OF CARE
Problem: OCCUPATIONAL THERAPY ADULT  Goal: Performs self-care activities at highest level of function for planned discharge setting  See evaluation for individualized goals  Treatment Interventions: ADL retraining, Functional transfer training, UE strengthening/ROM, Endurance training, Cognitive reorientation, Patient/family training, Compensatory technique education, Continued evaluation, Activityengagement  Equipment Recommended: Bedside commode, Tub seat with back       See flowsheet documentation for full assessment, interventions and recommendations  Outcome: Progressing  Limitation: Decreased ADL status, Decreased UE strength, Decreased Safe judgement during ADL, Decreased cognition, Decreased endurance, Visual deficit, Decreased fine motor control, Decreased high-level ADLs, Decreased self-care trans  Prognosis: Fair  Assessment: Pt participated in occupational therapy with focus on activity tolerance, functional transfers/mob, toileting/toilet transfers,  bed mob, standing balance and tolerance for pt engagement in functional self-care task/grooming tasks  Pt cleared by TONNY/Leora for pt participation in occupational therapy  Pt received sitting out of bed to bedside chair and agreeable to therapy following pt Identifiers confirmed  Pt reported her goal to go back to bed  Pt required assist for functional transfers/mob with RW and assist for toilet transfers 2* pt decreased overall strength  Pt able to complete toilet hygiene with Set up assist  Pt requires increased time with functional mob with RW 2* pt balance deficits  Pt will require in-pt rehab to continue to address pt noted deficits with decreased strength, coordination, balance and activity tolerance which currently impair pt ADL and functional mob         OT Discharge Recommendation: Short Term Rehab  OT - OK to Discharge:  (yes when medically stable to rehab)

## 2018-11-06 NOTE — SOCIAL WORK
CM was informed that pt is for dc tomorrow 11/7  CM spoke to Essie at [facility] who states pt is accepted and a bed is available  CM arranged with [facility] for a 4:45pm dc to [facility]  CM notified pt's bedside RN Darrion Bardales, pt's granddaughter Gifford Dancer, and Essie at [facility] of [facility] time  Facility transfer form and CMN completed  Chart copy requested

## 2018-11-06 NOTE — PHYSICAL THERAPY NOTE
Physical Therapy Progress Note     11/06/18 1226   Pain Assessment   Pain Assessment No/denies pain   Pain Score No Pain   Restrictions/Precautions   Weight Bearing Precautions Per Order No   Other Precautions Fall Risk;O2;Telemetry;Multiple lines; Chair Alarm   General   Chart Reviewed Yes   Family/Caregiver Present Yes  (daughter)   Cognition   Overall Cognitive Status Impaired   Arousal/Participation Alert; Cooperative   Subjective   Subjective Pt denies pain  Daughter present  With encouragement, agrees to participate  Transfers   Sit to Stand 3  Moderate assistance   Additional items Assist x 1; Armrests; Verbal cues   Stand to Sit 4  Minimal assistance   Additional items Assist x 1;Verbal cues   Stand pivot 4  Minimal assistance   Additional items Assist x 1   Toilet transfer 4  Minimal assistance   Additional items Assist x 2   Ambulation/Elevation   Gait pattern Improper Weight shift;Decreased foot clearance;Narrow SUHAS; Inconsistent valentino; Short stride  (slow)   Gait Assistance (min to mod assist)   Additional items Assist x 1;Verbal cues; Tactile cues  (Chair follow)   Assistive Device Rolling walker   Distance 70 feet   Stair Management Assistance Not tested   Balance   Static Sitting Fair -   Dynamic Sitting Poor +   Static Standing Poor +   Dynamic Standing Poor   Ambulatory Poor   Endurance Deficit   Endurance Deficit Yes   Endurance Deficit Description fatigue and weakness   Activity Tolerance   Activity Tolerance Patient limited by fatigue   Nurse [facility] to see per TONNY FRANKEL   Assessment   Prognosis Fair   Problem List Decreased strength;Decreased endurance; Impaired balance;Decreased mobility; Decreased coordination;Decreased cognition; Impaired judgement;Decreased safety awareness   Assessment Pt is making steady progress with functional mobility  Daughter present during session  With encouragement agrees to participate  Limited by weakness and confusion    Rehab aide present to assist with mobility as well as transfer on/off toilet  Slow with unsteady gait today requiring cues for base of support and safety  Chair follow required as well  Sp O2 3L 93%  Declined exercises due to fatigue  Pt would benefit from continued physical therapy to maximize functional mobility and safety  Goals   Patient Goals To rest   STG Expiration Date 11/16/18   Treatment Day 2   Plan   Treatment/Interventions Functional transfer training;LE strengthening/ROM; Therapeutic exercise; Endurance training;Cognitive reorientation;Patient/family training;Bed mobility;Gait training;Spoke to nursing   Progress Progressing toward goals   PT Frequency (3-5x/week)   Recommendation   Recommendation (Rehab)   Equipment Recommended Baltazar Faust  (KARNEA)     Dk Elkins, PTA

## 2018-11-07 VITALS
RESPIRATION RATE: 18 BRPM | HEART RATE: 94 BPM | SYSTOLIC BLOOD PRESSURE: 137 MMHG | DIASTOLIC BLOOD PRESSURE: 66 MMHG | BODY MASS INDEX: 21.7 KG/M2 | TEMPERATURE: 98.1 F | HEIGHT: 62 IN | OXYGEN SATURATION: 93 % | WEIGHT: 117.95 LBS

## 2018-11-07 PROBLEM — J84.10 PULMONARY FIBROSIS (HCC): Chronic | Status: ACTIVE | Noted: 2018-11-07

## 2018-11-07 LAB
GLUCOSE SERPL-MCNC: 107 MG/DL (ref 65–140)
GLUCOSE SERPL-MCNC: 98 MG/DL (ref 65–140)
GLUCOSE SERPL-MCNC: 98 MG/DL (ref 65–140)

## 2018-11-07 PROCEDURE — 94640 AIRWAY INHALATION TREATMENT: CPT

## 2018-11-07 PROCEDURE — G0009 ADMIN PNEUMOCOCCAL VACCINE: HCPCS | Performed by: INTERNAL MEDICINE

## 2018-11-07 PROCEDURE — 99239 HOSP IP/OBS DSCHRG MGMT >30: CPT | Performed by: NURSE PRACTITIONER

## 2018-11-07 PROCEDURE — 90670 PCV13 VACCINE IM: CPT | Performed by: INTERNAL MEDICINE

## 2018-11-07 PROCEDURE — 94760 N-INVAS EAR/PLS OXIMETRY 1: CPT

## 2018-11-07 PROCEDURE — 82948 REAGENT STRIP/BLOOD GLUCOSE: CPT

## 2018-11-07 RX ORDER — CEFUROXIME AXETIL 500 MG/1
500 TABLET ORAL EVERY 12 HOURS SCHEDULED
Qty: 8 TABLET | Refills: 0 | Status: SHIPPED | OUTPATIENT
Start: 2018-11-07 | End: 2018-11-11

## 2018-11-07 RX ORDER — METRONIDAZOLE 500 MG/1
500 TABLET ORAL EVERY 8 HOURS SCHEDULED
Qty: 24 TABLET | Refills: 0 | Status: SHIPPED | OUTPATIENT
Start: 2018-11-07 | End: 2018-11-11

## 2018-11-07 RX ADMIN — LEVALBUTEROL HYDROCHLORIDE 1.25 MG: 1.25 SOLUTION, CONCENTRATE RESPIRATORY (INHALATION) at 13:19

## 2018-11-07 RX ADMIN — PNEUMOCOCCAL 13-VALENT CONJUGATE VACCINE 0.5 ML: 2.2; 2.2; 2.2; 2.2; 2.2; 4.4; 2.2; 2.2; 2.2; 2.2; 2.2; 2.2; 2.2 INJECTION, SUSPENSION INTRAMUSCULAR at 15:10

## 2018-11-07 RX ADMIN — LEVALBUTEROL HYDROCHLORIDE 1.25 MG: 1.25 SOLUTION, CONCENTRATE RESPIRATORY (INHALATION) at 07:15

## 2018-11-07 RX ADMIN — ATORVASTATIN CALCIUM 20 MG: 80 TABLET, FILM COATED ORAL at 09:17

## 2018-11-07 RX ADMIN — ISODIUM CHLORIDE 3 ML: 0.03 SOLUTION RESPIRATORY (INHALATION) at 07:15

## 2018-11-07 RX ADMIN — LEVOTHYROXINE SODIUM 112 MCG: 112 TABLET ORAL at 05:14

## 2018-11-07 RX ADMIN — GUAIFENESIN 1200 MG: 600 TABLET, EXTENDED RELEASE ORAL at 09:17

## 2018-11-07 RX ADMIN — ENOXAPARIN SODIUM 40 MG: 40 INJECTION SUBCUTANEOUS at 09:17

## 2018-11-07 RX ADMIN — DULOXETINE HYDROCHLORIDE 20 MG: 20 CAPSULE, DELAYED RELEASE ORAL at 09:17

## 2018-11-07 RX ADMIN — PANTOPRAZOLE SODIUM 20 MG: 20 TABLET, DELAYED RELEASE ORAL at 05:14

## 2018-11-07 RX ADMIN — PIPERACILLIN SODIUM AND TAZOBACTAM SODIUM 3.38 G: 36; 4.5 INJECTION, POWDER, FOR SOLUTION INTRAVENOUS at 05:05

## 2018-11-07 RX ADMIN — ISODIUM CHLORIDE 3 ML: 0.03 SOLUTION RESPIRATORY (INHALATION) at 13:19

## 2018-11-07 RX ADMIN — PIPERACILLIN SODIUM AND TAZOBACTAM SODIUM 3.38 G: 36; 4.5 INJECTION, POWDER, FOR SOLUTION INTRAVENOUS at 12:01

## 2018-11-07 RX ADMIN — GABAPENTIN 300 MG: 300 CAPSULE ORAL at 09:17

## 2018-11-07 RX ADMIN — POTASSIUM CHLORIDE 20 MEQ: 200 INJECTION, SOLUTION INTRAVENOUS at 09:16

## 2018-11-07 NOTE — ASSESSMENT & PLAN NOTE
· With sepsis  · Community-acquired  · Will transition to oral antibiotics for discharge  · Supplemental oxygen 2 L nasal cannula, encourage out of bed  · Patient unable to wean off of oxygen  Will need to be discharged on this  · Encourage I S  · P r n  Bronchodilators  · Procalcitonin normal at last check   · Patient should have a repeat CXR within 4 weeks of discharge to ensure resolution of the pneumonia

## 2018-11-07 NOTE — ASSESSMENT & PLAN NOTE
· Due to sepsis, POA   · Treatment as above  · Wbc count improving and procalcitonin normalized   · Patient continues to have waxing and waning levels of alertness  But she is alert and conversive when she wakes up  Suspect this is all secondary to her being elderly with pulmonary fibrosis and emphysema as well as acute infection

## 2018-11-07 NOTE — SOCIAL WORK
CM received approval letter via fax from [address] [place] [address] at [address] [place] [address]  Letter sent to [facility] via [address] [place] [address]

## 2018-11-07 NOTE — SOCIAL WORK
CM called and spoke to [address] [place] [address] at [address] [place] [address] twice this morning to inform of pt's dc today and to discuss approval letter for out of state  [address] [place] [address] it is currently with her nurse to review  [address] [place] [address] she will again inform of pt's dc time today  Continue to await approval letter

## 2018-11-07 NOTE — DISCHARGE SUMMARY
Discharge- Kelton Sanchez 10/31/1929, 80 y o  female MRN: [id number]    [facility] Encounter: [id number]    Primary Care Provider: No primary care provider on file  Date and time admitted to hospital: 11/1/2018 12:15 PM        * [facility] )   Assessment & Plan    · With sepsis  · Community-acquired  · Will transition to oral antibiotics for discharge  · Supplemental oxygen 2 L nasal cannula, encourage out of bed  · Patient unable to wean off of oxygen  Will need to be discharged on this  · Encourage I S  · P r n  Bronchodilators  · Procalcitonin normal at last check   · Patient should have a repeat CXR within 4 weeks of discharge to ensure resolution of the pneumonia  Coronary artery disease of native artery of native heart with stable angina pectoris ([facility] )   Assessment & Plan    · Continue statin  · P r n  Nitroglycerin  · Stable     Abdominal aortic aneurysm (AAA) 3 0 cm to 5 0 cm in diameter in female [facility])   Assessment & Plan    · Incidentally found on CT scan on admission   · Will need vascular surgery follow-up as an outpatient  · CT:  5 cm infrarenal abdominal aortic aneurysm  Toxic metabolic encephalopathy   Assessment & Plan    · Due to sepsis, POA   · Treatment as above  · Wbc count improving and procalcitonin normalized   · Patient continues to have waxing and waning levels of alertness  But she is alert and conversive when she wakes up  Suspect this is all secondary to her being elderly with pulmonary fibrosis and emphysema as well as acute infection  Type 2 diabetes mellitus without complication, without long-term current use of insulin [facility])   Assessment & Plan    No results found for: HGBA1C    Recent Labs      11/06/18   1651  11/06/18   2057  11/07/18   0644  11/07/18   1111   POCGLU  97  155*  98  98       Blood Sugar Average: Last 72 hrs:  (P) 043 [id number]   Hold metformin while hospitalized  Sliding scale coverage    Carb controlled diet  Was on basal lantus 5 units but holding as patient's blood sugars are slightly on the low side       Pulmonary fibrosis (HCC)   Assessment & Plan    · Patient was on oxygen at 1 time and now with has been off while at home however has remained on oxygen while recovering from her pneumonia  · Will discharge her to the rehab on oxygen with attempts to wean off however suspect that the patient may require oxygen long-term now  Discharging Physician / Practitioner: AMERICA Espinoza  PCP: No primary care provider on file  Admission Date:   Admission Orders     Ordered        11/01/18 1728  Inpatient Admission (expected length of stay for this patient is greater than two midnights)  Once             Discharge Date: 11/07/18    Resolved Problems  Date Reviewed: 11/7/2018    None          Consultations During Hospital Stay:  · None    Procedures Performed:     · CT scan of the chest abdomen pelvis: Emphysema and pulmonary fibrosis  2   Patchy opacities, most likely pneumonia, in the left upper lobe and lingula  3   Dilated central pulmonary arteries, consistent with pulmonary arterial hypertension  4   Nonspecific mild mediastinal lymphadenopathy  5   5 cm infrarenal abdominal aortic aneurysm  6   No evidence of acute abnormality in the abdomen or pelvis  Significant Findings / Test Results:     · Pneumonia    Incidental Findings:   · Infrarenal abdominal aortic aneurysm and mild mediastinal lymphadenopathy    Test Results Pending at Discharge (will require follow up): · Should have a repeat chest x-ray in about 4 weeks     Outpatient Tests Requested:  · Repeat chest x-ray in about 4 weeks    Complications:  None    Reason for Admission:  Encephalopathy secondary to pneumonia    Hospital Course:      Spenser Xiao is a 80 y o  female patient who originally presented to the hospital on 11/1/2018 due to increased weakness, encephalopathy and sepsis secondary to community-acquired pneumonia with possible aspiration pneumonia  Patient is an elderly frail female who was traveling to see her son and her daughter noticed that she was more lethargic and brought her to the hospital   She was found to have an elevated pro calcitonin level and infection was suspected  She was treated for community-acquired pneumonia and then placed on Zosyn as there was concern for aspiration  She has done better and is stable to transition to a nursing home for rehab  She remains on oxygen and will likely continue this for quite some time secondary to her underlying emphysema and pulmonary fibrosis  Her procalcitonin level has returned to normal and her white blood cell count is down to 13,000  She has been afebrile  She is having more wakeful periods but having times where she is sleepy  She does arouse easily and is conversive during those times  Her family is present at bedside and wishes for to pursue rehab and she will be transferred to a rehab closer to the other family in [place] which is a 2 hr drive  Please see above list of diagnoses and related plan for additional information  Condition at Discharge: stable     Discharge Day Visit / Exam:     Subjective:  Feeling well today but tired  Does not want to go to rehab but will be cooperative  Eventually just wants to get home  No overnight events  Had a bowel movement yesterday  Eating small amounts of her food  No choking  Denies chest pain and shortness of breath  Vitals: Blood Pressure: 149/79 (11/07/18 0705)  Pulse: 92 (11/07/18 0705)  Temperature: 98 °F (36 7 °C) (11/07/18 0705)  Temp Source: Axillary (11/07/18 0705)  Respirations: 16 (11/07/18 0705)  Height: 5' 2" (157 5 cm) (11/01/18 2018)  Weight - Scale: 53 5 kg (117 lb 15 1 oz) (11/07/18 0623)  SpO2: 95 % (2L) (11/07/18 0715)  Exam:   Physical Exam   Constitutional: She appears well-nourished  No distress  HENT:   Head: Normocephalic     Eyes: Pupils are equal, round, and reactive to light  Neck: Normal range of motion  Neck supple  Cardiovascular: Normal rate, regular rhythm and normal heart sounds  Pulmonary/Chest: Effort normal and breath sounds normal  No respiratory distress  She has no wheezes  Abdominal: Soft  Bowel sounds are normal    Musculoskeletal: Normal range of motion  She exhibits no edema  Neurological: She is alert  Sleeping during exam however easily awakened and answers questions appropriately  Skin: Skin is warm and dry  Psychiatric: She has a normal mood and affect  Her behavior is normal  Thought content normal    Nursing note and vitals reviewed  Discussion with Family:  Discussed with daughter at bedside    Discharge instructions/Information to patient and family:   See after visit summary for information provided to patient and family  Provisions for Follow-Up Care:  See after visit summary for information related to follow-up care and any pertinent home health orders  Disposition:     Transfer to nursing home    For Discharges to [facility] SNF:   · Not Applicable to this Patient - Not Applicable to this Patient    Planned Readmission:  Not anticipated     Discharge Statement:  I spent 40 minutes discharging the patient  This time was spent on the day of discharge  I had direct contact with the patient on the day of discharge  Greater than 50% of the total time was spent examining patient, answering all patient questions, arranging and discussing plan of care with patient as well as directly providing post-discharge instructions  Additional time then spent on discharge activities  Discharge Medications:  See after visit summary for reconciled discharge medications provided to patient and family        ** Please Note: This note has been constructed using a voice recognition system **

## 2018-11-07 NOTE — RESTORATIVE TECHNICIAN NOTE
Restorative Specialist Mobility Note       Activity: Ambulate in room, Bathroom privileges, Chair, Dangle, Stand at bedside (Educated/encouraged pt to ambulate with assistance 3-4 x's/day  Chair alarm on   Pt callbell, phone/tray within reach )     Assistive Device: Front wheel walker (NC O2 on 2L)       Wood [employer], Restorative Technician, [employer]

## 2018-11-07 NOTE — ASSESSMENT & PLAN NOTE
· Patient was on oxygen at 1 time and now with has been off while at home however has remained on oxygen while recovering from her pneumonia  · Will discharge her to the rehab on oxygen with attempts to wean off however suspect that the patient may require oxygen long-term now

## 2018-11-07 NOTE — ASSESSMENT & PLAN NOTE
No results found for: HGBA1C    Recent Labs      11/06/18   1651  11/06/18   2057  11/07/18   0644  11/07/18   1111   POCGLU  97  155*  98  98       Blood Sugar Average: Last 72 hrs:  (P) 373 [id number]   Hold metformin while hospitalized  Sliding scale coverage  Carb controlled diet    Was on basal lantus 5 units but holding as patient's blood sugars are slightly on the low side

## 2018-12-12 ENCOUNTER — HOSPITAL ENCOUNTER (EMERGENCY)
Dept: HOSPITAL 14 - H.ER | Age: 83
Discharge: HOME | End: 2018-12-12
Payer: MEDICARE

## 2018-12-12 VITALS — HEART RATE: 89 BPM

## 2018-12-12 VITALS — DIASTOLIC BLOOD PRESSURE: 74 MMHG | RESPIRATION RATE: 19 BRPM | TEMPERATURE: 98.6 F | SYSTOLIC BLOOD PRESSURE: 132 MMHG
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
## 2018-12-12 DIAGNOSIS — F03.90: ICD-10-CM

## 2018-12-12 DIAGNOSIS — E78.00: ICD-10-CM

## 2018-12-12 DIAGNOSIS — Z79.84: ICD-10-CM
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
## 2018-12-12 DIAGNOSIS — S79.912A: ICD-10-CM

## 2018-12-12 DIAGNOSIS — E03.9: ICD-10-CM

## 2018-12-12 DIAGNOSIS — M51.36: ICD-10-CM

## 2018-12-12 DIAGNOSIS — S09.90XA: Primary | ICD-10-CM

## 2018-12-12 DIAGNOSIS — E11.9: ICD-10-CM

## 2018-12-12 DIAGNOSIS — I10: ICD-10-CM

## 2018-12-12 NOTE — RAD
Date of service: 



12/12/2018



PROCEDURE:  Radiographs of the Lumbar Spine.



HISTORY:

pain







COMPARISON:

No prior.



FINDINGS:



BONES:

Osteopenia, leftward lumbar convexity, diffuse lumbar spondylosis.  

Diffuse lumbar endplate spurring.  No compression fracture per 

lateral series 7552, image 5 appreciated.  No gross subluxation on 

this image.  On this lateral view the AP spinal canal appear shallow 

at L3-L4 and L5 levels..  Facet hypertrophic arthrosis at L4-5 and 

L5-S1 is noted.  No spondylolysis seen. 



Extensive atherosclerotic vascular calcifications are present.  

Bilateral common iliac vascular grafts are present.  Right upper 

quadrant chain sutures and clips present. 



Moderate stool retention rectum and right upper abdominal quadrant. 



Inferior right hemipelvic phleboliths inferred. 



DISC SPACES:

Unremarkable.



OTHER FINDINGS:

On the oblique view series 7552 image for the atherosclerotic 

descending abdominal aorta AP dimension appears aneurysmally a 

prominent at approximately 4.9 cm.  The chronicity of this is 

unknown.  No comparison pertinent studies here suggested.



IMPRESSION:

No suspect compression fracture seen.  In patient's bones are 

markedly osteopenic limiting optimal evaluation. 



Spondylosis, levoscoliosis, facet hypertrophic arthrosis our findings 

noted along with degenerative disc space narrowing most pronounced in 

the mid to upper lumbar spine. 



Extensive atherosclerotic vascular disease with common iliac vascular 

scanner stents present.  Is in for that the patient has had some form 

of prior vascular imaging assessment and no prior such images or 

reports are now available.  The atherosclerotic aneurysmally dilated 

descending abdominal aorta is therefore of unknown chronicity is also 

tortuous.  Comparison with prior outside studies is advised.  

Correlation with patient's prior vascular surgeon is advised in terms 

of assessing chronicity of this status. 



Cholecystectomy inferred.

## 2018-12-12 NOTE — CT
Date of service: 



12/12/2018



PROCEDURE:  CT HEAD WITHOUT CONTRAST.



HISTORY:

headache



COMPARISON:

None available.



TECHNIQUE:

Axial computed tomography images were obtained through the head/brain 

without intravenous contrast.  



Radiation dose:



Total exam DLP = 834.25 mGy-cm.



This CT exam was performed using one or more of the following dose 

reduction techniques: Automated exposure control, adjustment of the 

mA and/or kV according to patient size, and/or use of iterative 

reconstruction technique.



FINDINGS:



HEMORRHAGE:

No intracranial hemorrhage. 



BRAIN:

No mass effect or edema.  Marked cerebral atrophy present.  Marked 

periventricular hypodensities compatible with prominent microvascular 

ischemic changes.  Left and likely lesser right temporal lobe infarct 

changes--Subcortical.  No associated mass effect.



VENTRICLES:

Dilatation commensurate with the degree of atrophy. 



CALVARIUM:

Unremarkable.



PARANASAL SINUSES:

Minimal left posterior ethmoidal sinus mucosal thickening and/or 

small incidental retention cyst.  No more significant appearing 

paranasal sinus inflammatory changes noted. 



MASTOID AIR CELLS:

Unremarkable as visualized. No inflammatory changes.



OTHER FINDINGS:

None.



IMPRESSION:

No intracranial hemorrhage or mass effect.



Marked cerebral atrophy marked chronic appearing microvascular 

ischemic changes.  Left and probably right chronic appearing temporal 

lobe infarcts in subcortical.  No gross edema appreciated. 



. 



Trace posterior left ethmoidal sinus inflammatory changes as above.  

No more significant appearing paranasal sinus inflammatory changes 

suggested.

## 2018-12-12 NOTE — CT
Date of service: 



12/12/2018



PROCEDURE:  CT Cervical Spine without contrast



HISTORY:

neck pain



COMPARISON:

None available.



TECHNIQUE:

Axial computed tomography images were obtained of the cervical spine 

without the use of intravenous contrast. Coronal and sagittal 

reformatted images were created and reviewed.



Radiation dose:



Total exam DLP = 302.19 mGy-cm.



This CT exam was performed using one or more of the following dose 

reduction techniques: Automated exposure control, adjustment of the 

mA and/or kV according to patient size, and/or use of iterative 

reconstruction technique.



FINDINGS:



VERTEBRAE:

No fracture. Normal alignment. No destructive bony lesion.



DISCS/SPINAL CANAL/NEURAL FORAMINA:

Moderate spondylosis noted.  Mild to moderate narrowing of the 

intervertebral disc is spaces noted.



PARASPINAL SOFT TISSUES:

There is large subcutaneous lipoma in the posterior mid neck noted. 



OTHER FINDINGS:

The scan through the upper chest demonstrate moderate emphysematous 

changes.



IMPRESSION:

No evidence of acute displaced fracture or subluxation.



Additional findings as discussed above.

## 2018-12-12 NOTE — ED PDOC
HPI: Trauma/Fall





- HPI


Time Seen by Provider: 12/12/18 14:20


Chief Complaint (Nursing): Trauma


Chief Complaint (Provider): Pain back


History Per: Patient


History/Exam Limitations: no limitations


Onset/Duration Of Symptoms: Days (yesterday)


Additional Complaint(s): 





Pt. has dementia and is not suppose to walk on her own.  She gets unbalanced.  

Pt. got up on her own yesterday and fell backward onto her rocking chair.  

Granddaughter got her up and into bed.  Pt. then tried getting up out of bed and

fell face forward today morning.  Has complain of left lower back and hip.  No 

numbness, tingles, chest pain, weakness, dizziness, palpitations.  No 

incontinence or constipation.  No abd pain.  No headaches or neck pain.  Per 

daughter her falls were accidentally as she is not suppose to walk around on her

own. 





Past Medical History


Reviewed: Nursing Documentation, Vital Signs


Vital Signs: 





                                Last Vital Signs











Temp  98.3 F   12/12/18 13:57


 


Pulse  89   12/12/18 13:57


 


Resp  18   12/12/18 13:57


 


BP  167/84 H  12/12/18 13:57


 


Pulse Ox  93 L  12/12/18 13:57














- Medical History


PMH: Arthritis, Asthma, COPD, Dementia, Diabetes, HTN, Hypercholesterolemia, 

Hypothyroidism





- Family History


Family History: States: Unknown Family Hx





- Immunization History


Hx Tetanus Toxoid Vaccination: No


Hx Influenza Vaccination: No


Hx Pneumococcal Vaccination: No





- Home Medications


Home Medications: 


                                Ambulatory Orders











 Medication  Instructions  Recorded


 


Albuterol Sulfate [Albuterol 3 ml IH Q4 PRN 02/02/14





Sulfate 3 ml]  


 


Aspirin [Aspirin EC] 325 mg PO DAILY 02/02/14


 


Atorvastatin Calcium [Lipitor] 20 mg PO DAILY 02/02/14


 


DULoxetine [Cymbalta] 60 mg PO DAILY 02/02/14


 


Levothyroxine [Synthroid] 0.112 mg PO DAILY 02/02/14


 


Metformin Hydrochloride [Metformin] 500 mg PO BID 02/02/14


 


Montelukast [Singulair] 10 mg PO DAILY 02/02/14


 


RX: Donepezil Hydrochloride 10 mg PO DAILY 02/02/14


 


RX: Lorazepam 0.5 mg PO DAILY 02/02/14


 


RX: Nitroglycerin 0.4 mg SL PRN PRN 02/02/14


 


RX: Omeprazole 20 mg PO DAILY 02/02/14


 


RX: Zolpidem Tartrate 5 mg PO HS PRN 02/02/14


 


Tiotropium Bromide Inhaler 1 inhaler IH PRN PRN 02/02/14





[Spiriva Inhalation Handihaler  





Device]  


 


Tramadol Hydrochloride [Tramadol 50 mg PO BID PRN 02/02/14





HCl]  


 


Valsartan [Diovan] 80 mg PO DAILY 02/02/14


 


Docusate Sodium [Colace] 100 mg PO BID #30 sgl 04/30/15


 


Methylprednisolone [Medrol Dosepak] 4 mg PO DAILY #1 packet 04/30/15


 


Oxycodone HCl/Acetaminophen 1 tab PO Q6 PRN #24 tab 04/30/15





[Percocet 325 mg-5 mg]  


 


Ibuprofen [Motrin] 600 mg PO TID 7 Days  tab 04/24/17


 


Lidocaine 5% [Lidoderm] 1 ea TD DAILY PRN #10 patch 12/12/18














- Allergies


Allergies/Adverse Reactions: 


                                    Allergies











Allergy/AdvReac Type Severity Reaction Status Date / Time


 


No Known Allergies Allergy   Verified 12/12/18 13:56














Review of Systems


ROS Statement: Except As Marked, All Systems Reviewed And Found Negative


Musculoskeletal: Positive for: Back Pain, Leg Pain





Physical Exam





- Reviewed


Nursing Documentation Reviewed: Yes


Vital Signs Reviewed: Yes





- Physical Exam


Appears: Positive for: Non-toxic, No Acute Distress


Head Exam: Positive for: ATRAUMATIC, NORMAL INSPECTION, NORMOCEPHALIC


Skin: Positive for: Normal Color, Warm, DRY


Eye Exam: Positive for: EOMI, Normal appearance, PERRL


ENT: Positive for: Normal ENT Inspection


Neck: Positive for: Normal, Painless ROM, Supple


Cardiovascular/Chest: Positive for: Regular Rate, Rhythm


Respiratory: Positive for: CNT, Normal Breath Sounds


Gastrointestinal/Abdominal: Positive for: Normal Exam, Soft.  Negative for: 

Tenderness


Back: Positive for: Other (mild left lower lateral back; no step off.)


Extremity: Positive for: Tenderness (left hip).  Negative for: Normal ROM 

(limite ROM at the left hip due to pain), Pedal Edema, Calf Tenderness


Neurologic/Psych: Positive for: Alert, CNs II-XII.  Negative for: Aphasia, 

Facial Droop





- ECG


O2 Sat by Pulse Oximetry: 93





- Progress


ED Course And Treament: 





1456:  Stable.  Dr. Bright to take over care.  Pt. on asa, no other blood 

thinners.  





Disposition





- Clinical Impression


Clinical Impression: 


 Hip injury, Degenerative disc disease, lumbar, Head injury








- Disposition


Referrals: 


Israel Dickens MD [Family Provider] -  (FOLLOW UP WITH DR DICKENS TOMORROW AS 

SCHEDULED)


Disposition Time: 15:00


Condition: STABLE


Prescriptions: 


Lidocaine 5% [Lidoderm] 1 ea TD DAILY PRN #10 patch


 PRN Reason: PAIN


Instructions:  Preventing Falls in the Older Adult, Contusion (DC), Minor Head 

Injury (DC), Degenerative Disc Disease (DC)

## 2018-12-12 NOTE — RAD
Date of service: 



12/12/2018



PROCEDURE:  



HISTORY:

pain



COMPARISON:

None



TECHNIQUE:

Three views



FINDINGS:

Generalized osteopenia noted.  Bilateral superolateral hip joint 

space narrowing right greater than left.  Greater right acetabular 

spurring in this patient with left-sided hip pain noted.



No gross fracture appreciated.



No dislocation seen.



Extensive atherosclerotic vascular disease noted.  Bilateral common 

iliac vascular stents inferred.



IMPRESSION:

Generalized osteopenia.  Bilateral hip arthrosis right greater than 

symptomatic left.  The non symptomatic sided right femoral head is 

slightly deformed 



No acute fracture of the left hip appreciated. 



Vascular disease as noted above.

## 2018-12-12 NOTE — ED PDOC
- ECG


O2 Sat by Pulse Oximetry: 93 (RA)


Pulse Ox Interpretation: Normal





Medical Decision Making


Medical Decision Making: 


Time: 1500


-- Patient endorsed to me by Dr. Castro, pending XRs, CT, re-evaluation and final

ER disposition. 





Time: 1522


LUMBAR XR RESULTS


FINDINGS:





BONES:


Osteopenia, leftward lumbar convexity, diffuse lumbar spondylosis.  Diffuse 

lumbar endplate spurring.  No compression fracture per lateral series 7552, im

age 5 appreciated.  No gross subluxation on this image.  On this lateral view 

the AP spinal canal appear shallow at L3-L4 and L5 levels..  Facet hypertrophic 

arthrosis at L4-5 and L5-S1 is noted.  No spondylolysis seen. 





Extensive atherosclerotic vascular calcifications are present.  Bilateral common

iliac vascular grafts are present.  Right upper quadrant chain sutures and clips

present. 





Moderate stool retention rectum and right upper abdominal quadrant. 





Inferior right hemipelvic phleboliths inferred. 





DISC SPACES:


Unremarkable.





OTHER FINDINGS:


On the oblique view series 7552 image for the atherosclerotic descending 

abdominal aorta AP dimension appears aneurysmally a prominent at approximately 

4.9 cm.  The chronicity of this is unknown.  No comparison pertinent studies 

here suggested.





IMPRESSION:


No suspect compression fracture seen.  In patient's bones are markedly 

osteopenic limiting optimal evaluation. 





Spondylosis, levoscoliosis, facet hypertrophic arthrosis our findings noted 

along with degenerative disc space narrowing most pronounced in the mid to upper

lumbar spine. 





Extensive atherosclerotic vascular disease with common iliac vascular scanner 

stents present.  Is in for that the patient has had some form of prior vascular 

imaging assessment and no prior such images or reports are now available.  The 

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 

unknown chronicity is also tortuous.  Comparison with prior outside studies is 

advised.  Correlation with patient's prior vascular surgeon is advised in terms 

of assessing chronicity of this status. 





Cholecystectomy inferred.





Time: 1527


CT HEAD RESULTS


FINDINGS:





HEMORRHAGE:


No intracranial hemorrhage. 





BRAIN:


No mass effect or edema.  Marked cerebral atrophy present.  Marked 

periventricular hypodensities compatible with prominent microvascular ischemic 

changes.  Left and likely lesser right temporal lobe infarct changes--

Subcortical.  No associated mass effect.





VENTRICLES:


Dilatation commensurate with the degree of atrophy. 





CALVARIUM:


Unremarkable.





PARANASAL SINUSES:


Minimal left posterior ethmoidal sinus mucosal thickening and/or small 

incidental retention cyst.  No more significant appearing paranasal sinus 

inflammatory changes noted. 





MASTOID AIR CELLS:


Unremarkable as visualized. No inflammatory changes.





OTHER FINDINGS:


None.





IMPRESSION:


No intracranial hemorrhage or mass effect.





Marked cerebral atrophy marked chronic appearing microvascular ischemic changes.

 Left and probably right chronic appearing temporal lobe infarcts in 

subcortical.  No gross edema appreciated. 


Trace posterior left ethmoidal sinus inflammatory changes as above.  No more 

significant appearing paranasal sinus inflammatory changes suggested.





Time: 1544


CT CERVICAL RESULTS


FINDINGS:





VERTEBRAE:


No fracture. Normal alignment. No destructive bony lesion.





DISCS/SPINAL CANAL/NEURAL FORAMINA:


Moderate spondylosis noted.  Mild to moderate narrowing of the intervertebral 

disc is spaces noted.





PARASPINAL SOFT TISSUES:


There is large subcutaneous lipoma in the posterior mid neck noted. 





OTHER FINDINGS:


The scan through the upper chest demonstrate moderate emphysematous changes.





IMPRESSION:


No evidence of acute displaced fracture or subluxation.





Additional findings as discussed above.





Time: 1630


HIP XR RESULTS


FINDINGS:


Generalized osteopenia noted.  Bilateral superolateral hip joint space narrowing

right greater than left.  Greater right acetabular spurring in this patient with

left-sided hip pain noted.





No gross fracture appreciated.





No dislocation seen.





Extensive atherosclerotic vascular disease noted.  Bilateral common iliac 

vascular stents inferred.





IMPRESSION:


Generalized osteopenia.  Bilateral hip arthrosis right greater than symptomatic 

left.  The non symptomatic sided right femoral head is slightly deformed 





No acute fracture of the left hip appreciated. 





Vascular disease as noted above.





Time: 1635


-- Discussed findings and plan of care with patient and family. Patient advised 

to take Tylenol and Motrin as needed. Will give lidoderm patch for area of most 

developed pain. Patient to follow up with PMD tomorrow. 


______________________________________________________________________

____________


Scribe Attestation:


Documented by Alexander Noel, acting as a scribe for Dipti Bright MD.





Provider Scribe Attestation:


All medical record entries made by the Scribe were at my direction and 

personally dictated by me. I have reviewed the chart and agree that the record 

accurately reflects my personal performance of the history, physical exam, 

medical decision making, and the department course for this patient. I have also

personally directed, reviewed, and agree with the discharge instructions and 

disposition.





Disposition


Counseled Patient/Family Regarding: Studies Performed, Diagnosis, Need For 

Followup, Rx Given





- Clinical Impression


Clinical Impression: 


 Hip injury, Degenerative disc disease, lumbar, Head injury








- POA


Present On Arrival: Falls Or Trauma





- Disposition


Referrals: 


Israel Dickens MD [Family Provider] -  (FOLLOW UP WITH DR DICKENS TOMORROW AS 

SCHEDULED)


Disposition: Routine/Home


Disposition Time: 16:40


Condition: STABLE


Prescriptions: 


Lidocaine 5% [Lidoderm] 1 ea TD DAILY PRN #10 patch


 PRN Reason: PAIN


Instructions:  Minor Head Injury (DC), Degenerative Disc Disease (DC), Contusion

 (DC), Preventing Falls in the Older Adult

## 2018-12-14 VITALS — OXYGEN SATURATION: 93 %

## 2021-09-07 ENCOUNTER — OFFICE VISIT (OUTPATIENT)
Dept: FAMILY MEDICINE CLINIC | Facility: CLINIC | Age: 86
End: 2021-09-07
Payer: MEDICARE

## 2021-09-07 ENCOUNTER — APPOINTMENT (OUTPATIENT)
Dept: LAB | Age: 86
End: 2021-09-07
Payer: MEDICARE

## 2021-09-07 VITALS
BODY MASS INDEX: 18.85 KG/M2 | DIASTOLIC BLOOD PRESSURE: 60 MMHG | HEART RATE: 61 BPM | SYSTOLIC BLOOD PRESSURE: 104 MMHG | OXYGEN SATURATION: 96 % | HEIGHT: 60 IN | RESPIRATION RATE: 16 BRPM | WEIGHT: 96 LBS | TEMPERATURE: 97.3 F

## 2021-09-07 DIAGNOSIS — E78.49 OTHER HYPERLIPIDEMIA: ICD-10-CM

## 2021-09-07 DIAGNOSIS — I73.9 PERIPHERAL VASCULAR DISEASE (HCC): ICD-10-CM

## 2021-09-07 DIAGNOSIS — M79.672 PAIN IN BOTH FEET: ICD-10-CM

## 2021-09-07 DIAGNOSIS — E11.9 TYPE 2 DIABETES MELLITUS WITH HEMOGLOBIN A1C GOAL OF LESS THAN 7.0% (HCC): Primary | ICD-10-CM

## 2021-09-07 DIAGNOSIS — E11.9 TYPE 2 DIABETES MELLITUS WITH HEMOGLOBIN A1C GOAL OF LESS THAN 7.0% (HCC): ICD-10-CM

## 2021-09-07 DIAGNOSIS — M79.671 PAIN IN BOTH FEET: ICD-10-CM

## 2021-09-07 DIAGNOSIS — E44.1 MILD PROTEIN-CALORIE MALNUTRITION (HCC): ICD-10-CM

## 2021-09-07 DIAGNOSIS — I25.119 CORONARY ARTERY DISEASE INVOLVING NATIVE CORONARY ARTERY OF NATIVE HEART WITH ANGINA PECTORIS (HCC): ICD-10-CM

## 2021-09-07 DIAGNOSIS — E03.8 OTHER SPECIFIED HYPOTHYROIDISM: ICD-10-CM

## 2021-09-07 DIAGNOSIS — F02.80 DEMENTIA DUE TO MEDICAL CONDITION WITHOUT BEHAVIORAL DISTURBANCE (HCC): ICD-10-CM

## 2021-09-07 DIAGNOSIS — E78.5 DYSLIPIDEMIA, GOAL LDL BELOW 70: ICD-10-CM

## 2021-09-07 DIAGNOSIS — I25.118 ATHEROSCLEROTIC HEART DISEASE OF NATIVE CORONARY ARTERY WITH OTHER FORMS OF ANGINA PECTORIS (HCC): ICD-10-CM

## 2021-09-07 DIAGNOSIS — J44.9 COPD, SEVERITY TO BE DETERMINED (HCC): ICD-10-CM

## 2021-09-07 LAB
ALBUMIN SERPL BCP-MCNC: 2.7 G/DL (ref 3.5–5)
ALP SERPL-CCNC: 118 U/L (ref 46–116)
ALT SERPL W P-5'-P-CCNC: 21 U/L (ref 12–78)
ANION GAP SERPL CALCULATED.3IONS-SCNC: 3 MMOL/L (ref 4–13)
AST SERPL W P-5'-P-CCNC: 20 U/L (ref 5–45)
BASOPHILS # BLD AUTO: 0.06 THOUSANDS/ΜL (ref 0–0.1)
BASOPHILS NFR BLD AUTO: 1 % (ref 0–1)
BILIRUB SERPL-MCNC: 0.26 MG/DL (ref 0.2–1)
BUN SERPL-MCNC: 20 MG/DL (ref 5–25)
CALCIUM ALBUM COR SERPL-MCNC: 9.5 MG/DL (ref 8.3–10.1)
CALCIUM SERPL-MCNC: 8.5 MG/DL (ref 8.3–10.1)
CHLORIDE SERPL-SCNC: 106 MMOL/L (ref 100–108)
CHOLEST SERPL-MCNC: 157 MG/DL (ref 50–200)
CO2 SERPL-SCNC: 30 MMOL/L (ref 21–32)
CREAT SERPL-MCNC: 0.52 MG/DL (ref 0.6–1.3)
EOSINOPHIL # BLD AUTO: 0.25 THOUSAND/ΜL (ref 0–0.61)
EOSINOPHIL NFR BLD AUTO: 2 % (ref 0–6)
ERYTHROCYTE [DISTWIDTH] IN BLOOD BY AUTOMATED COUNT: 14.5 % (ref 11.6–15.1)
EST. AVERAGE GLUCOSE BLD GHB EST-MCNC: 123 MG/DL
GFR SERPL CREATININE-BSD FRML MDRD: 84 ML/MIN/1.73SQ M
GLUCOSE P FAST SERPL-MCNC: 89 MG/DL (ref 65–99)
HBA1C MFR BLD: 5.9 %
HCT VFR BLD AUTO: 38.9 % (ref 34.8–46.1)
HDLC SERPL-MCNC: 59 MG/DL
HGB BLD-MCNC: 11.8 G/DL (ref 11.5–15.4)
IMM GRANULOCYTES # BLD AUTO: 0.07 THOUSAND/UL (ref 0–0.2)
IMM GRANULOCYTES NFR BLD AUTO: 1 % (ref 0–2)
LDLC SERPL CALC-MCNC: 86 MG/DL (ref 0–100)
LYMPHOCYTES # BLD AUTO: 1.82 THOUSANDS/ΜL (ref 0.6–4.47)
LYMPHOCYTES NFR BLD AUTO: 14 % (ref 14–44)
MCH RBC QN AUTO: 28.2 PG (ref 26.8–34.3)
MCHC RBC AUTO-ENTMCNC: 30.3 G/DL (ref 31.4–37.4)
MCV RBC AUTO: 93 FL (ref 82–98)
MONOCYTES # BLD AUTO: 0.85 THOUSAND/ΜL (ref 0.17–1.22)
MONOCYTES NFR BLD AUTO: 7 % (ref 4–12)
NEUTROPHILS # BLD AUTO: 9.81 THOUSANDS/ΜL (ref 1.85–7.62)
NEUTS SEG NFR BLD AUTO: 75 % (ref 43–75)
NRBC BLD AUTO-RTO: 0 /100 WBCS
PLATELET # BLD AUTO: 343 THOUSANDS/UL (ref 149–390)
PMV BLD AUTO: 9.7 FL (ref 8.9–12.7)
POTASSIUM SERPL-SCNC: 4.2 MMOL/L (ref 3.5–5.3)
PROT SERPL-MCNC: 7 G/DL (ref 6.4–8.2)
RBC # BLD AUTO: 4.18 MILLION/UL (ref 3.81–5.12)
SODIUM SERPL-SCNC: 139 MMOL/L (ref 136–145)
TRIGL SERPL-MCNC: 62 MG/DL
TSH SERPL DL<=0.05 MIU/L-ACNC: 0.45 UIU/ML (ref 0.36–3.74)
WBC # BLD AUTO: 12.86 THOUSAND/UL (ref 4.31–10.16)

## 2021-09-07 PROCEDURE — 36415 COLL VENOUS BLD VENIPUNCTURE: CPT

## 2021-09-07 PROCEDURE — 83036 HEMOGLOBIN GLYCOSYLATED A1C: CPT

## 2021-09-07 PROCEDURE — 80061 LIPID PANEL: CPT

## 2021-09-07 PROCEDURE — 80053 COMPREHEN METABOLIC PANEL: CPT

## 2021-09-07 PROCEDURE — 85025 COMPLETE CBC W/AUTO DIFF WBC: CPT

## 2021-09-07 PROCEDURE — 99204 OFFICE O/P NEW MOD 45 MIN: CPT | Performed by: FAMILY MEDICINE

## 2021-09-07 PROCEDURE — 84443 ASSAY THYROID STIM HORMONE: CPT

## 2021-09-07 RX ORDER — UREA 10 %
1 LOTION (ML) TOPICAL
COMMUNITY
Start: 2021-08-08

## 2021-09-07 RX ORDER — ASPIRIN 81 MG
TABLET, DELAYED RELEASE (ENTERIC COATED) ORAL
COMMUNITY

## 2021-09-07 RX ORDER — CEPHALEXIN 500 MG/1
CAPSULE ORAL
COMMUNITY
Start: 2021-08-31 | End: 2022-04-26 | Stop reason: ALTCHOICE

## 2021-09-07 RX ORDER — ATORVASTATIN CALCIUM 10 MG/1
10 TABLET, FILM COATED ORAL
COMMUNITY
Start: 2021-08-08 | End: 2022-08-01 | Stop reason: SDUPTHER

## 2021-09-07 RX ORDER — LEVOTHYROXINE SODIUM 0.1 MG/1
100 TABLET ORAL DAILY
COMMUNITY
Start: 2021-08-08 | End: 2022-08-01 | Stop reason: SDUPTHER

## 2021-09-07 RX ORDER — DONEPEZIL HYDROCHLORIDE 10 MG/1
10 TABLET, FILM COATED ORAL
COMMUNITY
Start: 2021-08-08 | End: 2022-08-01 | Stop reason: SDUPTHER

## 2021-09-07 RX ORDER — ASPIRIN 81 MG/1
81 TABLET ORAL DAILY
COMMUNITY
End: 2022-08-01 | Stop reason: SDUPTHER

## 2021-09-07 RX ORDER — DULOXETIN HYDROCHLORIDE 20 MG/1
20 CAPSULE, DELAYED RELEASE ORAL DAILY
COMMUNITY
Start: 2021-08-09 | End: 2022-08-01 | Stop reason: SDUPTHER

## 2021-09-07 NOTE — ASSESSMENT & PLAN NOTE
Malnutrition Findings:           BMI Findings: Body mass index is 18 75 kg/m²  Improving  Will continue to encourage high-protein diet

## 2021-09-07 NOTE — PROGRESS NOTES
Assessment/Plan:  Type 2 diabetes mellitus with hemoglobin A1c goal of less than 7 0% (HCC)    I will check her A1c  Continue to monitor  No results found for: HGBA1C    Hypothyroidism    Continue same  Will continue to monitor  Hypertension    Well controlled  Continue same  Will continue to monitor  Dementia due to medical condition without behavioral disturbance (HCC)    Continue Aricept  Will continue to monitor  Mild protein-calorie malnutrition ([facility] )  Malnutrition Findings:           BMI Findings: Body mass index is 18 75 kg/m²  Improving  Will continue to encourage high-protein diet  Dyslipidemia, goal LDL below 70    Continue same  Will continue to monitor  I will check fasting lipid profile  CAD (coronary artery disease), native coronary artery    Stable  Asymptomatic  Continue same  Continue to monitor  Diagnoses and all orders for this visit:    Type 2 diabetes mellitus with hemoglobin A1c goal of less than 7 0% (HCC)  -     CBC and differential; Future  -     Comprehensive metabolic panel; Future  -     Lipid Panel with Direct LDL reflex; Future  -     Hemoglobin A1C; Future  -     TSH, 3rd generation with Free T4 reflex; Future    Other specified hypothyroidism    Peripheral vascular disease ([facility] [address] )    Other hyperlipidemia  -     CBC and differential; Future  -     Comprehensive metabolic panel; Future  -     Lipid Panel with Direct LDL reflex; Future  -     TSH, 3rd generation with Free T4 reflex; Future    Mild protein-calorie malnutrition (HCC)    COPD, severity to be determined ([facility] [address] )    Dementia due to medical condition without behavioral disturbance ([facility] [address] )    Atherosclerotic heart disease of native coronary artery with other forms of angina pectoris ([address] )    Pain in both feet  -     Ambulatory referral to Podiatry;  Future    Dyslipidemia, goal LDL below 70    Coronary artery disease involving native coronary artery of native heart with angina pectoris ([facility] )    Other orders  -     atorvastatin (LIPITOR) 10 mg tablet; Take 10 mg by mouth daily at bedtime  -     cephalexin (KEFLEX) 500 mg capsule; TAKE ONE CAPSULE BY MOUTH TWICE A DAY FOR 10 DAYS  -     donepezil (ARICEPT) 10 mg tablet; Take 10 mg by mouth daily at bedtime  -     DULoxetine (CYMBALTA) 20 mg capsule; Take 20 mg by mouth daily  -     levothyroxine 100 mcg tablet; Take 100 mcg by mouth daily  -     metoprolol tartrate (LOPRESSOR) 25 mg tablet; Take 12 5 mg by mouth 2 (two) times a day  -     melatonin 1 mg; Take 1 mg by mouth daily at bedtime  -     Docusate Sodium 100 MG capsule; Take by mouth  -     aspirin (ECOTRIN LOW STRENGTH) 81 mg EC tablet; Take 81 mg by mouth daily          There are no Patient Instructions on file for this visit  Return in about 1 year (around 9/7/2022)  Subjective:      Patient ID: Al Burleson is a 80 y o  female  Chief Complaint   Patient presents with   [address] Medicare Wellness Visit         She is here today with her daughter and granddaughter to establish and for follow-up multiple medical problems  She has been taking all her medications  Denies any side effects  She is doing well with her eating and sleeping  She has problem with urinary incontinence she wear pads  She had problems UTI lays today and was prescribed Keflex  Her symptoms improved  The following portions of the patient's history were reviewed and updated as appropriate: allergies, current medications, past family history, past medical history, past social history, past surgical history and problem list     Review of Systems   Constitutional: Negative for chills and fever  HENT: Negative for trouble swallowing  Eyes: Negative for visual disturbance  Respiratory: Negative for cough and shortness of breath  Cardiovascular: Negative for chest pain, palpitations and leg swelling  Gastrointestinal: Negative for abdominal pain, constipation and diarrhea  Endocrine: Negative for cold intolerance and heat intolerance  Genitourinary: Negative for dysuria  Musculoskeletal: Negative for gait problem  Skin: Negative for rash  Neurological: Negative for dizziness, tremors, seizures and headaches  Hematological: Negative for adenopathy  Psychiatric/Behavioral: Negative for behavioral problems  Current Outpatient Medications   Medication Sig Dispense Refill    albuterol (2 5 mg/3 mL) 0 083 % nebulizer solution Take 2 5 mg by nebulization every 6 (six) hours as needed for wheezing or shortness of breath      atorvastatin (LIPITOR) 10 mg tablet Take 10 mg by mouth daily at bedtime      cephalexin (KEFLEX) 500 mg capsule TAKE ONE CAPSULE BY MOUTH TWICE A DAY FOR 10 DAYS      Docusate Sodium 100 MG capsule Take by mouth      donepezil (ARICEPT) 10 mg tablet Take 10 mg by mouth daily at bedtime      DULoxetine (CYMBALTA) 20 mg capsule Take 20 mg by mouth daily      levothyroxine 100 mcg tablet Take 100 mcg by mouth daily      melatonin 1 mg Take 1 mg by mouth daily at bedtime      metoprolol tartrate (LOPRESSOR) 25 mg tablet Take 12 5 mg by mouth 2 (two) times a day      montelukast (SINGULAIR) 10 mg tablet Take 10 mg by mouth daily at bedtime      nitroglycerin (NITROSTAT) 0 4 mg SL tablet Place 0 4 mg under the tongue every 5 (five) minutes as needed for chest pain      omeprazole (PriLOSEC) 20 mg delayed release capsule Take 20 mg by mouth daily      aspirin (ECOTRIN LOW STRENGTH) 81 mg EC tablet Take 81 mg by mouth daily       No current facility-administered medications for this visit  Objective:    /60 (BP Location: Left arm, Patient Position: Sitting, Cuff Size: Adult)   Pulse 61   Temp (!) 97 3 °F (36 3 °C) (Tympanic)   Resp 16   Ht 5' (1 524 m)   Wt 43 5 kg (96 lb)   SpO2 96%   BMI 18 75 kg/m²        Physical Exam  Vitals and nursing note reviewed  Constitutional:       Appearance: Normal appearance   She is well-developed  HENT:      Head: Normocephalic and atraumatic  Left Ear: There is impacted cerumen  Eyes:      Pupils: Pupils are equal, round, and reactive to light  Cardiovascular:      Rate and Rhythm: Normal rate and regular rhythm  Heart sounds: Normal heart sounds  Pulmonary:      Effort: Pulmonary effort is normal       Breath sounds: Normal breath sounds  Abdominal:      General: Bowel sounds are normal       Palpations: Abdomen is soft  Musculoskeletal:      Cervical back: Normal range of motion and neck supple  Right lower leg: No edema  Left lower leg: No edema  Lymphadenopathy:      Cervical: No cervical adenopathy  Skin:     General: Skin is warm  Neurological:      Mental Status: She is alert and oriented to person, place, and time  Cranial Nerves: No cranial nerve deficit                  Olga Wiggins MD

## 2021-09-23 ENCOUNTER — PATIENT OUTREACH (OUTPATIENT)
Dept: FAMILY MEDICINE CLINIC | Facility: CLINIC | Age: 86
End: 2021-09-23

## 2021-09-23 DIAGNOSIS — Z71.89 COMPLEX CARE COORDINATION: Primary | ICD-10-CM

## 2021-09-23 NOTE — PROGRESS NOTES
Outpatient Care Management Note:  RE:Pt referred for Care Management, possibly social work CM by PCP provider  Requested to contact pt's granddaughter  Message left for Hailee Willams, requesting return call back  First attempt

## 2021-10-07 ENCOUNTER — PATIENT OUTREACH (OUTPATIENT)
Dept: FAMILY MEDICINE CLINIC | Facility: CLINIC | Age: 86
End: 2021-10-07

## 2021-10-08 ENCOUNTER — PATIENT OUTREACH (OUTPATIENT)
Dept: FAMILY MEDICINE CLINIC | Facility: CLINIC | Age: 86
End: 2021-10-08

## 2021-10-08 DIAGNOSIS — Z71.89 COMPLEX CARE COORDINATION: Primary | ICD-10-CM

## 2021-10-11 ENCOUNTER — PATIENT OUTREACH (OUTPATIENT)
Dept: FAMILY MEDICINE CLINIC | Facility: CLINIC | Age: 86
End: 2021-10-11

## 2021-10-12 ENCOUNTER — PATIENT OUTREACH (OUTPATIENT)
Dept: FAMILY MEDICINE CLINIC | Facility: CLINIC | Age: 86
End: 2021-10-12

## 2021-10-12 DIAGNOSIS — F02.80 DEMENTIA DUE TO MEDICAL CONDITION WITHOUT BEHAVIORAL DISTURBANCE (HCC): Primary | ICD-10-CM

## 2021-10-12 DIAGNOSIS — J44.9 COPD, SEVERITY TO BE DETERMINED (HCC): ICD-10-CM

## 2021-10-12 DIAGNOSIS — I73.9 PERIPHERAL VASCULAR DISEASE (HCC): ICD-10-CM

## 2021-10-12 DIAGNOSIS — N30.00 ACUTE CYSTITIS WITHOUT HEMATURIA: ICD-10-CM

## 2021-10-12 DIAGNOSIS — F41.9 ANXIETY: ICD-10-CM

## 2021-10-12 DIAGNOSIS — E11.9 TYPE 2 DIABETES MELLITUS WITH HEMOGLOBIN A1C GOAL OF LESS THAN 7.0% (HCC): ICD-10-CM

## 2021-10-12 DIAGNOSIS — I25.119 CORONARY ARTERY DISEASE INVOLVING NATIVE CORONARY ARTERY OF NATIVE HEART WITH ANGINA PECTORIS (HCC): ICD-10-CM

## 2021-10-12 DIAGNOSIS — E44.1 MILD PROTEIN-CALORIE MALNUTRITION (HCC): ICD-10-CM

## 2021-10-12 RX ORDER — CIPROFLOXACIN 500 MG/1
500 TABLET, FILM COATED ORAL EVERY 12 HOURS SCHEDULED
Qty: 14 TABLET | Refills: 0 | Status: SHIPPED | OUTPATIENT
Start: 2021-10-12 | End: 2021-10-19

## 2021-10-12 RX ORDER — ALPRAZOLAM 0.25 MG/1
0.25 TABLET ORAL
Qty: 30 TABLET | Refills: 2 | Status: SHIPPED | OUTPATIENT
Start: 2021-10-12 | End: 2021-11-09 | Stop reason: SDUPTHER

## 2021-10-15 ENCOUNTER — TELEPHONE (OUTPATIENT)
Dept: FAMILY MEDICINE CLINIC | Facility: CLINIC | Age: 86
End: 2021-10-15

## 2021-10-15 ENCOUNTER — APPOINTMENT (OUTPATIENT)
Dept: LAB | Age: 86
End: 2021-10-15
Payer: MEDICARE

## 2021-10-15 DIAGNOSIS — N30.00 ACUTE CYSTITIS WITHOUT HEMATURIA: ICD-10-CM

## 2021-10-15 LAB
BACTERIA UR QL AUTO: ABNORMAL /HPF
BILIRUB UR QL STRIP: NEGATIVE
CLARITY UR: ABNORMAL
COLOR UR: YELLOW
GLUCOSE UR STRIP-MCNC: NEGATIVE MG/DL
HGB UR QL STRIP.AUTO: ABNORMAL
HYALINE CASTS #/AREA URNS LPF: ABNORMAL /LPF
KETONES UR STRIP-MCNC: NEGATIVE MG/DL
LEUKOCYTE ESTERASE UR QL STRIP: ABNORMAL
NITRITE UR QL STRIP: POSITIVE
NON-SQ EPI CELLS URNS QL MICRO: ABNORMAL /HPF
PH UR STRIP.AUTO: 6.5 [PH]
PROT UR STRIP-MCNC: NEGATIVE MG/DL
RBC #/AREA URNS AUTO: ABNORMAL /HPF
SP GR UR STRIP.AUTO: 1.02 (ref 1–1.03)
UROBILINOGEN UR QL STRIP.AUTO: 1 E.U./DL
WBC #/AREA URNS AUTO: ABNORMAL /HPF

## 2021-10-15 PROCEDURE — 87186 SC STD MICRODIL/AGAR DIL: CPT

## 2021-10-15 PROCEDURE — 81001 URINALYSIS AUTO W/SCOPE: CPT

## 2021-10-15 PROCEDURE — 87077 CULTURE AEROBIC IDENTIFY: CPT

## 2021-10-15 PROCEDURE — 87086 URINE CULTURE/COLONY COUNT: CPT

## 2021-10-17 LAB — BACTERIA UR CULT: ABNORMAL

## 2021-10-26 ENCOUNTER — IMMUNIZATIONS (OUTPATIENT)
Dept: FAMILY MEDICINE CLINIC | Facility: HOSPITAL | Age: 86
End: 2021-10-26

## 2021-10-26 DIAGNOSIS — Z23 ENCOUNTER FOR IMMUNIZATION: Primary | ICD-10-CM

## 2021-10-26 PROCEDURE — 0001A COVID-19 PFIZER VACC 0.3 ML: CPT

## 2021-10-26 PROCEDURE — 91300 COVID-19 PFIZER VACC 0.3 ML: CPT

## 2021-10-29 ENCOUNTER — PATIENT OUTREACH (OUTPATIENT)
Dept: FAMILY MEDICINE CLINIC | Facility: CLINIC | Age: 86
End: 2021-10-29

## 2021-11-05 ENCOUNTER — TELEPHONE (OUTPATIENT)
Dept: FAMILY MEDICINE CLINIC | Facility: CLINIC | Age: 86
End: 2021-11-05

## 2021-11-12 ENCOUNTER — APPOINTMENT (OUTPATIENT)
Dept: LAB | Facility: CLINIC | Age: 86
End: 2021-11-12
Payer: MEDICARE

## 2021-11-12 DIAGNOSIS — N30.00 ACUTE CYSTITIS WITHOUT HEMATURIA: ICD-10-CM

## 2021-11-12 LAB
BACTERIA UR QL AUTO: ABNORMAL /HPF
BILIRUB UR QL STRIP: ABNORMAL
CLARITY UR: ABNORMAL
COLOR UR: YELLOW
GLUCOSE UR STRIP-MCNC: NEGATIVE MG/DL
HGB UR QL STRIP.AUTO: ABNORMAL
HYALINE CASTS #/AREA URNS LPF: ABNORMAL /LPF
KETONES UR STRIP-MCNC: ABNORMAL MG/DL
LEUKOCYTE ESTERASE UR QL STRIP: ABNORMAL
NITRITE UR QL STRIP: POSITIVE
NON-SQ EPI CELLS URNS QL MICRO: ABNORMAL /HPF
PH UR STRIP.AUTO: 5.5 [PH]
PROT UR STRIP-MCNC: ABNORMAL MG/DL
RBC #/AREA URNS AUTO: ABNORMAL /HPF
SP GR UR STRIP.AUTO: 1.03 (ref 1–1.03)
UROBILINOGEN UR QL STRIP.AUTO: 0.2 E.U./DL
WBC #/AREA URNS AUTO: ABNORMAL /HPF

## 2021-11-12 PROCEDURE — 87086 URINE CULTURE/COLONY COUNT: CPT

## 2021-11-12 PROCEDURE — 87186 SC STD MICRODIL/AGAR DIL: CPT

## 2021-11-12 PROCEDURE — 81001 URINALYSIS AUTO W/SCOPE: CPT

## 2021-11-12 PROCEDURE — 87077 CULTURE AEROBIC IDENTIFY: CPT

## 2021-11-14 LAB
BACTERIA UR CULT: ABNORMAL
BACTERIA UR CULT: ABNORMAL

## 2021-11-15 ENCOUNTER — TELEPHONE (OUTPATIENT)
Dept: FAMILY MEDICINE CLINIC | Facility: CLINIC | Age: 86
End: 2021-11-15

## 2021-11-19 ENCOUNTER — TELEPHONE (OUTPATIENT)
Dept: ADMINISTRATIVE | Facility: OTHER | Age: 86
End: 2021-11-19

## 2021-11-19 ENCOUNTER — CLINICAL SUPPORT (OUTPATIENT)
Dept: FAMILY MEDICINE CLINIC | Facility: CLINIC | Age: 86
End: 2021-11-19
Payer: MEDICARE

## 2021-11-19 DIAGNOSIS — Z23 NEED FOR INFLUENZA VACCINATION: Primary | ICD-10-CM

## 2021-11-19 PROCEDURE — 90662 IIV NO PRSV INCREASED AG IM: CPT

## 2021-11-19 PROCEDURE — G0008 ADMIN INFLUENZA VIRUS VAC: HCPCS

## 2021-11-23 ENCOUNTER — PATIENT OUTREACH (OUTPATIENT)
Dept: FAMILY MEDICINE CLINIC | Facility: CLINIC | Age: 86
End: 2021-11-23

## 2022-01-01 ENCOUNTER — HOME CARE VISIT (OUTPATIENT)
Dept: HOME HOSPICE | Facility: HOSPICE | Age: 87
End: 2022-01-01
Payer: MEDICARE

## 2022-01-01 ENCOUNTER — HOSPITAL ENCOUNTER (INPATIENT)
Facility: HOSPITAL | Age: 87
LOS: 5 days | Discharge: HOME HEALTH | DRG: 177 | End: 2022-10-11
Attending: EMERGENCY MEDICINE | Admitting: INTERNAL MEDICINE
Payer: MEDICARE

## 2022-01-01 ENCOUNTER — HOSPICE ADMISSION (OUTPATIENT)
Dept: HOME HOSPICE | Facility: HOSPICE | Age: 87
End: 2022-01-01
Payer: MEDICARE

## 2022-01-01 ENCOUNTER — HOME CARE VISIT (OUTPATIENT)
Dept: HOME HEALTH SERVICES | Facility: HOME HEALTHCARE | Age: 87
End: 2022-01-01
Payer: MEDICARE

## 2022-01-01 ENCOUNTER — APPOINTMENT (OUTPATIENT)
Dept: RADIOLOGY | Facility: HOSPITAL | Age: 87
DRG: 177 | End: 2022-01-01
Payer: MEDICARE

## 2022-01-01 VITALS
DIASTOLIC BLOOD PRESSURE: 76 MMHG | HEART RATE: 66 BPM | TEMPERATURE: 98 F | RESPIRATION RATE: 18 BRPM | OXYGEN SATURATION: 98 % | SYSTOLIC BLOOD PRESSURE: 184 MMHG

## 2022-01-01 VITALS
SYSTOLIC BLOOD PRESSURE: 120 MMHG | HEART RATE: 111 BPM | RESPIRATION RATE: 20 BRPM | DIASTOLIC BLOOD PRESSURE: 84 MMHG | TEMPERATURE: 98.2 F

## 2022-01-01 VITALS — RESPIRATION RATE: 24 BRPM | SYSTOLIC BLOOD PRESSURE: 142 MMHG | DIASTOLIC BLOOD PRESSURE: 70 MMHG | HEART RATE: 74 BPM

## 2022-01-01 VITALS — HEART RATE: 72 BPM | RESPIRATION RATE: 24 BRPM

## 2022-01-01 DIAGNOSIS — J96.21 ACUTE ON CHRONIC RESPIRATORY FAILURE WITH HYPOXIA (HCC): ICD-10-CM

## 2022-01-01 DIAGNOSIS — R13.10 DYSPHAGIA, UNSPECIFIED TYPE: ICD-10-CM

## 2022-01-01 DIAGNOSIS — E03.8 OTHER SPECIFIED HYPOTHYROIDISM: ICD-10-CM

## 2022-01-01 DIAGNOSIS — R06.03 RESPIRATORY DISTRESS: Primary | ICD-10-CM

## 2022-01-01 DIAGNOSIS — J20.8 ACUTE BRONCHITIS DUE TO OTHER SPECIFIED ORGANISMS: Primary | ICD-10-CM

## 2022-01-01 DIAGNOSIS — J96.01 ACUTE RESPIRATORY FAILURE WITH HYPOXIA (HCC): Primary | ICD-10-CM

## 2022-01-01 DIAGNOSIS — F02.80 DEMENTIA DUE TO MEDICAL CONDITION WITHOUT BEHAVIORAL DISTURBANCE (HCC): ICD-10-CM

## 2022-01-01 DIAGNOSIS — T17.908A ASPIRATION INTO AIRWAY, INITIAL ENCOUNTER: ICD-10-CM

## 2022-01-01 DIAGNOSIS — J18.9 PNEUMONIA OF LEFT UPPER LOBE DUE TO INFECTIOUS ORGANISM: ICD-10-CM

## 2022-01-01 DIAGNOSIS — J44.9 COPD, SEVERITY TO BE DETERMINED (HCC): ICD-10-CM

## 2022-01-01 DIAGNOSIS — I10 PRIMARY HYPERTENSION: ICD-10-CM

## 2022-01-01 DIAGNOSIS — R53.81 GENERAL PHYSICAL DETERIORATION: ICD-10-CM

## 2022-01-01 DIAGNOSIS — E78.49 OTHER HYPERLIPIDEMIA: ICD-10-CM

## 2022-01-01 DIAGNOSIS — J18.9 PNEUMONIA: ICD-10-CM

## 2022-01-01 LAB
ALBUMIN SERPL BCP-MCNC: 1.8 G/DL (ref 3.5–5)
ALBUMIN SERPL BCP-MCNC: 2.3 G/DL (ref 3.5–5)
ALP SERPL-CCNC: 137 U/L (ref 46–116)
ALP SERPL-CCNC: 209 U/L (ref 46–116)
ALT SERPL W P-5'-P-CCNC: 20 U/L (ref 12–78)
ALT SERPL W P-5'-P-CCNC: 9 U/L (ref 12–78)
ANION GAP SERPL CALCULATED.3IONS-SCNC: 2 MMOL/L (ref 4–13)
ANION GAP SERPL CALCULATED.3IONS-SCNC: 4 MMOL/L (ref 4–13)
ANION GAP SERPL CALCULATED.3IONS-SCNC: 4 MMOL/L (ref 4–13)
ANION GAP SERPL CALCULATED.3IONS-SCNC: 5 MMOL/L (ref 4–13)
APTT PPP: 27 SECONDS (ref 23–37)
AST SERPL W P-5'-P-CCNC: 13 U/L (ref 5–45)
AST SERPL W P-5'-P-CCNC: 32 U/L (ref 5–45)
ATRIAL RATE: 88 BPM
BACTERIA BLD CULT: NORMAL
BACTERIA BLD CULT: NORMAL
BACTERIA SPT RESP CULT: NORMAL
BASOPHILS # BLD AUTO: 0.02 THOUSANDS/ΜL (ref 0–0.1)
BASOPHILS # BLD AUTO: 0.03 THOUSANDS/ΜL (ref 0–0.1)
BASOPHILS # BLD AUTO: 0.03 THOUSANDS/ΜL (ref 0–0.1)
BASOPHILS # BLD AUTO: 0.04 THOUSANDS/ΜL (ref 0–0.1)
BASOPHILS # BLD AUTO: 0.05 THOUSANDS/ΜL (ref 0–0.1)
BASOPHILS NFR BLD AUTO: 0 % (ref 0–1)
BILIRUB SERPL-MCNC: 0.19 MG/DL (ref 0.2–1)
BILIRUB SERPL-MCNC: 0.23 MG/DL (ref 0.2–1)
BUN SERPL-MCNC: 11 MG/DL (ref 5–25)
BUN SERPL-MCNC: 13 MG/DL (ref 5–25)
BUN SERPL-MCNC: 21 MG/DL (ref 5–25)
BUN SERPL-MCNC: 22 MG/DL (ref 5–25)
CALCIUM ALBUM COR SERPL-MCNC: 10.3 MG/DL (ref 8.3–10.1)
CALCIUM ALBUM COR SERPL-MCNC: 10.3 MG/DL (ref 8.3–10.1)
CALCIUM SERPL-MCNC: 8.4 MG/DL (ref 8.3–10.1)
CALCIUM SERPL-MCNC: 8.5 MG/DL (ref 8.3–10.1)
CALCIUM SERPL-MCNC: 8.7 MG/DL (ref 8.3–10.1)
CALCIUM SERPL-MCNC: 8.9 MG/DL (ref 8.3–10.1)
CHLORIDE SERPL-SCNC: 102 MMOL/L (ref 96–108)
CHLORIDE SERPL-SCNC: 103 MMOL/L (ref 96–108)
CHLORIDE SERPL-SCNC: 104 MMOL/L (ref 96–108)
CHLORIDE SERPL-SCNC: 104 MMOL/L (ref 96–108)
CO2 SERPL-SCNC: 29 MMOL/L (ref 21–32)
CO2 SERPL-SCNC: 30 MMOL/L (ref 21–32)
CO2 SERPL-SCNC: 32 MMOL/L (ref 21–32)
CO2 SERPL-SCNC: 32 MMOL/L (ref 21–32)
CREAT SERPL-MCNC: 0.35 MG/DL (ref 0.6–1.3)
CREAT SERPL-MCNC: 0.41 MG/DL (ref 0.6–1.3)
CREAT SERPL-MCNC: 0.47 MG/DL (ref 0.6–1.3)
CREAT SERPL-MCNC: 0.47 MG/DL (ref 0.6–1.3)
DME PARACHUTE DELIVERY DATE REQUESTED: NORMAL
DME PARACHUTE ITEM DESCRIPTION: NORMAL
DME PARACHUTE ORDER STATUS: NORMAL
DME PARACHUTE SUPPLIER NAME: NORMAL
DME PARACHUTE SUPPLIER PHONE: NORMAL
EOSINOPHIL # BLD AUTO: 0.09 THOUSAND/ΜL (ref 0–0.61)
EOSINOPHIL # BLD AUTO: 0.1 THOUSAND/ΜL (ref 0–0.61)
EOSINOPHIL # BLD AUTO: 0.14 THOUSAND/ΜL (ref 0–0.61)
EOSINOPHIL # BLD AUTO: 0.15 THOUSAND/ΜL (ref 0–0.61)
EOSINOPHIL # BLD AUTO: 0.25 THOUSAND/ΜL (ref 0–0.61)
EOSINOPHIL NFR BLD AUTO: 1 % (ref 0–6)
EOSINOPHIL NFR BLD AUTO: 2 % (ref 0–6)
ERYTHROCYTE [DISTWIDTH] IN BLOOD BY AUTOMATED COUNT: 14.8 % (ref 11.6–15.1)
ERYTHROCYTE [DISTWIDTH] IN BLOOD BY AUTOMATED COUNT: 14.9 % (ref 11.6–15.1)
ERYTHROCYTE [DISTWIDTH] IN BLOOD BY AUTOMATED COUNT: 15.1 % (ref 11.6–15.1)
ERYTHROCYTE [DISTWIDTH] IN BLOOD BY AUTOMATED COUNT: 15.2 % (ref 11.6–15.1)
ERYTHROCYTE [DISTWIDTH] IN BLOOD BY AUTOMATED COUNT: 15.2 % (ref 11.6–15.1)
FERRITIN SERPL-MCNC: 65 NG/ML (ref 8–388)
FOLATE SERPL-MCNC: 8.3 NG/ML (ref 3.1–17.5)
GFR SERPL CREATININE-BSD FRML MDRD: 86 ML/MIN/1.73SQ M
GFR SERPL CREATININE-BSD FRML MDRD: 86 ML/MIN/1.73SQ M
GFR SERPL CREATININE-BSD FRML MDRD: 89 ML/MIN/1.73SQ M
GFR SERPL CREATININE-BSD FRML MDRD: 94 ML/MIN/1.73SQ M
GGT SERPL-CCNC: 38 U/L (ref 5–85)
GLUCOSE SERPL-MCNC: 116 MG/DL (ref 65–140)
GLUCOSE SERPL-MCNC: 137 MG/DL (ref 65–140)
GLUCOSE SERPL-MCNC: 146 MG/DL (ref 65–140)
GLUCOSE SERPL-MCNC: 91 MG/DL (ref 65–140)
GRAM STN SPEC: NORMAL
HCT VFR BLD AUTO: 29.7 % (ref 34.8–46.1)
HCT VFR BLD AUTO: 32.8 % (ref 34.8–46.1)
HCT VFR BLD AUTO: 32.8 % (ref 34.8–46.1)
HCT VFR BLD AUTO: 34 % (ref 34.8–46.1)
HCT VFR BLD AUTO: 36.5 % (ref 34.8–46.1)
HEMOCCULT STL QL: NEGATIVE
HEMOCCULT STL QL: NORMAL
HEMOCCULT STL QL: NORMAL
HGB BLD-MCNC: 10.2 G/DL (ref 11.5–15.4)
HGB BLD-MCNC: 10.5 G/DL (ref 11.5–15.4)
HGB BLD-MCNC: 8.6 G/DL (ref 11.5–15.4)
HGB BLD-MCNC: 9.6 G/DL (ref 11.5–15.4)
HGB BLD-MCNC: 9.7 G/DL (ref 11.5–15.4)
IMM GRANULOCYTES # BLD AUTO: 0.07 THOUSAND/UL (ref 0–0.2)
IMM GRANULOCYTES # BLD AUTO: 0.09 THOUSAND/UL (ref 0–0.2)
IMM GRANULOCYTES # BLD AUTO: 0.09 THOUSAND/UL (ref 0–0.2)
IMM GRANULOCYTES # BLD AUTO: 0.12 THOUSAND/UL (ref 0–0.2)
IMM GRANULOCYTES # BLD AUTO: 0.15 THOUSAND/UL (ref 0–0.2)
IMM GRANULOCYTES NFR BLD AUTO: 1 % (ref 0–2)
INR PPP: 0.96 (ref 0.84–1.19)
IRON SATN MFR SERPL: 17 % (ref 15–50)
IRON SERPL-MCNC: 34 UG/DL (ref 50–170)
LACTATE SERPL-SCNC: 1 MMOL/L (ref 0.5–2)
LYMPHOCYTES # BLD AUTO: 1.06 THOUSANDS/ΜL (ref 0.6–4.47)
LYMPHOCYTES # BLD AUTO: 1.5 THOUSANDS/ΜL (ref 0.6–4.47)
LYMPHOCYTES # BLD AUTO: 1.58 THOUSANDS/ΜL (ref 0.6–4.47)
LYMPHOCYTES # BLD AUTO: 1.62 THOUSANDS/ΜL (ref 0.6–4.47)
LYMPHOCYTES # BLD AUTO: 2.05 THOUSANDS/ΜL (ref 0.6–4.47)
LYMPHOCYTES NFR BLD AUTO: 10 % (ref 14–44)
LYMPHOCYTES NFR BLD AUTO: 13 % (ref 14–44)
LYMPHOCYTES NFR BLD AUTO: 14 % (ref 14–44)
LYMPHOCYTES NFR BLD AUTO: 14 % (ref 14–44)
LYMPHOCYTES NFR BLD AUTO: 4 % (ref 14–44)
MCH RBC QN AUTO: 26.5 PG (ref 26.8–34.3)
MCH RBC QN AUTO: 26.6 PG (ref 26.8–34.3)
MCH RBC QN AUTO: 26.8 PG (ref 26.8–34.3)
MCH RBC QN AUTO: 27 PG (ref 26.8–34.3)
MCH RBC QN AUTO: 27.7 PG (ref 26.8–34.3)
MCHC RBC AUTO-ENTMCNC: 28.8 G/DL (ref 31.4–37.4)
MCHC RBC AUTO-ENTMCNC: 29 G/DL (ref 31.4–37.4)
MCHC RBC AUTO-ENTMCNC: 29.3 G/DL (ref 31.4–37.4)
MCHC RBC AUTO-ENTMCNC: 29.6 G/DL (ref 31.4–37.4)
MCHC RBC AUTO-ENTMCNC: 30 G/DL (ref 31.4–37.4)
MCV RBC AUTO: 91 FL (ref 82–98)
MCV RBC AUTO: 92 FL (ref 82–98)
MONOCYTES # BLD AUTO: 0.51 THOUSAND/ΜL (ref 0.17–1.22)
MONOCYTES # BLD AUTO: 0.65 THOUSAND/ΜL (ref 0.17–1.22)
MONOCYTES # BLD AUTO: 0.69 THOUSAND/ΜL (ref 0.17–1.22)
MONOCYTES # BLD AUTO: 0.79 THOUSAND/ΜL (ref 0.17–1.22)
MONOCYTES # BLD AUTO: 0.9 THOUSAND/ΜL (ref 0.17–1.22)
MONOCYTES NFR BLD AUTO: 4 % (ref 4–12)
MONOCYTES NFR BLD AUTO: 4 % (ref 4–12)
MONOCYTES NFR BLD AUTO: 5 % (ref 4–12)
MONOCYTES NFR BLD AUTO: 5 % (ref 4–12)
MONOCYTES NFR BLD AUTO: 6 % (ref 4–12)
MRSA NOSE QL CULT: NORMAL
NEUTROPHILS # BLD AUTO: 12.66 THOUSANDS/ΜL (ref 1.85–7.62)
NEUTROPHILS # BLD AUTO: 13.79 THOUSANDS/ΜL (ref 1.85–7.62)
NEUTROPHILS # BLD AUTO: 21.7 THOUSANDS/ΜL (ref 1.85–7.62)
NEUTROPHILS # BLD AUTO: 8.22 THOUSANDS/ΜL (ref 1.85–7.62)
NEUTROPHILS # BLD AUTO: 9.13 THOUSANDS/ΜL (ref 1.85–7.62)
NEUTS SEG NFR BLD AUTO: 78 % (ref 43–75)
NEUTS SEG NFR BLD AUTO: 79 % (ref 43–75)
NEUTS SEG NFR BLD AUTO: 80 % (ref 43–75)
NEUTS SEG NFR BLD AUTO: 83 % (ref 43–75)
NEUTS SEG NFR BLD AUTO: 90 % (ref 43–75)
NRBC BLD AUTO-RTO: 0 /100 WBCS
P AXIS: 22 DEGREES
PLATELET # BLD AUTO: 270 THOUSANDS/UL (ref 149–390)
PLATELET # BLD AUTO: 278 THOUSANDS/UL (ref 149–390)
PLATELET # BLD AUTO: 290 THOUSANDS/UL (ref 149–390)
PLATELET # BLD AUTO: 303 THOUSANDS/UL (ref 149–390)
PLATELET # BLD AUTO: 334 THOUSANDS/UL (ref 149–390)
PMV BLD AUTO: 9.1 FL (ref 8.9–12.7)
PMV BLD AUTO: 9.1 FL (ref 8.9–12.7)
PMV BLD AUTO: 9.2 FL (ref 8.9–12.7)
PMV BLD AUTO: 9.5 FL (ref 8.9–12.7)
PMV BLD AUTO: 9.6 FL (ref 8.9–12.7)
POTASSIUM SERPL-SCNC: 3.5 MMOL/L (ref 3.5–5.3)
POTASSIUM SERPL-SCNC: 3.6 MMOL/L (ref 3.5–5.3)
POTASSIUM SERPL-SCNC: 4 MMOL/L (ref 3.5–5.3)
POTASSIUM SERPL-SCNC: 5 MMOL/L (ref 3.5–5.3)
PR INTERVAL: 196 MS
PROCALCITONIN SERPL-MCNC: 0.13 NG/ML
PROCALCITONIN SERPL-MCNC: 0.14 NG/ML
PROT SERPL-MCNC: 6 G/DL (ref 6.4–8.4)
PROT SERPL-MCNC: 6.9 G/DL (ref 6.4–8.4)
PROTHROMBIN TIME: 12.9 SECONDS (ref 11.6–14.5)
QRS AXIS: -5 DEGREES
QRSD INTERVAL: 84 MS
QT INTERVAL: 366 MS
QTC INTERVAL: 442 MS
RBC # BLD AUTO: 3.23 MILLION/UL (ref 3.81–5.12)
RBC # BLD AUTO: 3.58 MILLION/UL (ref 3.81–5.12)
RBC # BLD AUTO: 3.59 MILLION/UL (ref 3.81–5.12)
RBC # BLD AUTO: 3.68 MILLION/UL (ref 3.81–5.12)
RBC # BLD AUTO: 3.96 MILLION/UL (ref 3.81–5.12)
SARS-COV-2 RNA RESP QL NAA+PROBE: NEGATIVE
SODIUM SERPL-SCNC: 137 MMOL/L (ref 135–147)
SODIUM SERPL-SCNC: 138 MMOL/L (ref 135–147)
T WAVE AXIS: 5 DEGREES
TIBC SERPL-MCNC: 206 UG/DL (ref 250–450)
VANCOMYCIN TROUGH SERPL-MCNC: 10.3 UG/ML (ref 10–20)
VENTRICULAR RATE: 88 BPM
VIT B12 SERPL-MCNC: 172 PG/ML (ref 100–900)
WBC # BLD AUTO: 10.42 THOUSAND/UL (ref 4.31–10.16)
WBC # BLD AUTO: 11.67 THOUSAND/UL (ref 4.31–10.16)
WBC # BLD AUTO: 15.72 THOUSAND/UL (ref 4.31–10.16)
WBC # BLD AUTO: 16.46 THOUSAND/UL (ref 4.31–10.16)
WBC # BLD AUTO: 24 THOUSAND/UL (ref 4.31–10.16)

## 2022-01-01 PROCEDURE — 85025 COMPLETE CBC W/AUTO DIFF WBC: CPT

## 2022-01-01 PROCEDURE — 85610 PROTHROMBIN TIME: CPT | Performed by: EMERGENCY MEDICINE

## 2022-01-01 PROCEDURE — 80048 BASIC METABOLIC PNL TOTAL CA: CPT

## 2022-01-01 PROCEDURE — T2042 HOSPICE ROUTINE HOME CARE: HCPCS

## 2022-01-01 PROCEDURE — G0299 HHS/HOSPICE OF RN EA 15 MIN: HCPCS

## 2022-01-01 PROCEDURE — 87040 BLOOD CULTURE FOR BACTERIA: CPT | Performed by: EMERGENCY MEDICINE

## 2022-01-01 PROCEDURE — 99232 SBSQ HOSP IP/OBS MODERATE 35: CPT | Performed by: INTERNAL MEDICINE

## 2022-01-01 PROCEDURE — 85025 COMPLETE CBC W/AUTO DIFF WBC: CPT | Performed by: EMERGENCY MEDICINE

## 2022-01-01 PROCEDURE — 80202 ASSAY OF VANCOMYCIN: CPT | Performed by: STUDENT IN AN ORGANIZED HEALTH CARE EDUCATION/TRAINING PROGRAM

## 2022-01-01 PROCEDURE — 87070 CULTURE OTHR SPECIMN AEROBIC: CPT

## 2022-01-01 PROCEDURE — 99223 1ST HOSP IP/OBS HIGH 75: CPT | Performed by: INTERNAL MEDICINE

## 2022-01-01 PROCEDURE — 84145 PROCALCITONIN (PCT): CPT | Performed by: STUDENT IN AN ORGANIZED HEALTH CARE EDUCATION/TRAINING PROGRAM

## 2022-01-01 PROCEDURE — 96365 THER/PROPH/DIAG IV INF INIT: CPT

## 2022-01-01 PROCEDURE — 87205 SMEAR GRAM STAIN: CPT

## 2022-01-01 PROCEDURE — 10330057 MEDICATION, GENERAL

## 2022-01-01 PROCEDURE — 80053 COMPREHEN METABOLIC PANEL: CPT | Performed by: EMERGENCY MEDICINE

## 2022-01-01 PROCEDURE — 82977 ASSAY OF GGT: CPT | Performed by: STUDENT IN AN ORGANIZED HEALTH CARE EDUCATION/TRAINING PROGRAM

## 2022-01-01 PROCEDURE — 10330064 GLOVE, EXAM VNYL LG N/S (100/BX 10BX/CS)

## 2022-01-01 PROCEDURE — 36415 COLL VENOUS BLD VENIPUNCTURE: CPT | Performed by: EMERGENCY MEDICINE

## 2022-01-01 PROCEDURE — 10330064

## 2022-01-01 PROCEDURE — 94760 N-INVAS EAR/PLS OXIMETRY 1: CPT

## 2022-01-01 PROCEDURE — 82607 VITAMIN B-12: CPT

## 2022-01-01 PROCEDURE — 10330064 FOAM, ADH SIL W/BORDER SACRAL 7"X7" (10/

## 2022-01-01 PROCEDURE — 10330064 GLOVE, EXAM VNYL MED N/S (100/BX 10BX/CS

## 2022-01-01 PROCEDURE — 36415 COLL VENOUS BLD VENIPUNCTURE: CPT | Performed by: STUDENT IN AN ORGANIZED HEALTH CARE EDUCATION/TRAINING PROGRAM

## 2022-01-01 PROCEDURE — 85730 THROMBOPLASTIN TIME PARTIAL: CPT | Performed by: EMERGENCY MEDICINE

## 2022-01-01 PROCEDURE — 82746 ASSAY OF FOLIC ACID SERUM: CPT

## 2022-01-01 PROCEDURE — 99285 EMERGENCY DEPT VISIT HI MDM: CPT | Performed by: EMERGENCY MEDICINE

## 2022-01-01 PROCEDURE — G0155 HHCP-SVS OF CSW,EA 15 MIN: HCPCS

## 2022-01-01 PROCEDURE — NC001 PR NO CHARGE: Performed by: INTERNAL MEDICINE

## 2022-01-01 PROCEDURE — 80053 COMPREHEN METABOLIC PANEL: CPT

## 2022-01-01 PROCEDURE — 10330064 ALIGNER, FOAM BODY SM (8/CS)

## 2022-01-01 PROCEDURE — 80048 BASIC METABOLIC PNL TOTAL CA: CPT | Performed by: STUDENT IN AN ORGANIZED HEALTH CARE EDUCATION/TRAINING PROGRAM

## 2022-01-01 PROCEDURE — 92526 ORAL FUNCTION THERAPY: CPT

## 2022-01-01 PROCEDURE — 84145 PROCALCITONIN (PCT): CPT | Performed by: EMERGENCY MEDICINE

## 2022-01-01 PROCEDURE — 94640 AIRWAY INHALATION TREATMENT: CPT

## 2022-01-01 PROCEDURE — 93005 ELECTROCARDIOGRAM TRACING: CPT

## 2022-01-01 PROCEDURE — 83605 ASSAY OF LACTIC ACID: CPT | Performed by: EMERGENCY MEDICINE

## 2022-01-01 PROCEDURE — 10330064 BRIEF, WINGS CHOICE+ QUILTED LG (18/BG 4

## 2022-01-01 PROCEDURE — U0003 INFECTIOUS AGENT DETECTION BY NUCLEIC ACID (DNA OR RNA); SEVERE ACUTE RESPIRATORY SYNDROME CORONAVIRUS 2 (SARS-COV-2) (CORONAVIRUS DISEASE [COVID-19]), AMPLIFIED PROBE TECHNIQUE, MAKING USE OF HIGH THROUGHPUT TECHNOLOGIES AS DESCRIBED BY CMS-2020-01-R: HCPCS | Performed by: EMERGENCY MEDICINE

## 2022-01-01 PROCEDURE — 92610 EVALUATE SWALLOWING FUNCTION: CPT

## 2022-01-01 PROCEDURE — 93010 ELECTROCARDIOGRAM REPORT: CPT | Performed by: INTERNAL MEDICINE

## 2022-01-01 PROCEDURE — 87081 CULTURE SCREEN ONLY: CPT | Performed by: STUDENT IN AN ORGANIZED HEALTH CARE EDUCATION/TRAINING PROGRAM

## 2022-01-01 PROCEDURE — 83550 IRON BINDING TEST: CPT

## 2022-01-01 PROCEDURE — 99238 HOSP IP/OBS DSCHRG MGMT 30/<: CPT | Performed by: INTERNAL MEDICINE

## 2022-01-01 PROCEDURE — 10330087 HSPC SERVICE INTENSITY ADD-ON

## 2022-01-01 PROCEDURE — 85025 COMPLETE CBC W/AUTO DIFF WBC: CPT | Performed by: STUDENT IN AN ORGANIZED HEALTH CARE EDUCATION/TRAINING PROGRAM

## 2022-01-01 PROCEDURE — 99222 1ST HOSP IP/OBS MODERATE 55: CPT | Performed by: INTERNAL MEDICINE

## 2022-01-01 PROCEDURE — 71045 X-RAY EXAM CHEST 1 VIEW: CPT

## 2022-01-01 PROCEDURE — U0005 INFEC AGEN DETEC AMPLI PROBE: HCPCS | Performed by: EMERGENCY MEDICINE

## 2022-01-01 PROCEDURE — 82272 OCCULT BLD FECES 1-3 TESTS: CPT

## 2022-01-01 PROCEDURE — 83540 ASSAY OF IRON: CPT

## 2022-01-01 PROCEDURE — 94664 DEMO&/EVAL PT USE INHALER: CPT

## 2022-01-01 PROCEDURE — 99285 EMERGENCY DEPT VISIT HI MDM: CPT

## 2022-01-01 PROCEDURE — 10330064 UNDERPAD, REUSE 36X36 1DZ     BECKCL

## 2022-01-01 PROCEDURE — 82728 ASSAY OF FERRITIN: CPT

## 2022-01-01 RX ORDER — AZITHROMYCIN 250 MG/1
TABLET, FILM COATED ORAL
Qty: 6 TABLET | Refills: 0 | Status: SHIPPED | OUTPATIENT
Start: 2022-01-01 | End: 2022-01-01

## 2022-01-01 RX ORDER — LEVALBUTEROL 1.25 MG/.5ML
1.25 SOLUTION, CONCENTRATE RESPIRATORY (INHALATION) 2 TIMES DAILY
Qty: 60 ML | Refills: 0 | Status: SHIPPED | OUTPATIENT
Start: 2022-01-01 | End: 2022-01-01

## 2022-01-01 RX ORDER — ALBUTEROL SULFATE 2.5 MG/3ML
2.5 SOLUTION RESPIRATORY (INHALATION) EVERY 4 HOURS PRN
Status: DISCONTINUED | OUTPATIENT
Start: 2022-01-01 | End: 2022-01-01 | Stop reason: HOSPADM

## 2022-01-01 RX ORDER — IPRATROPIUM BROMIDE AND ALBUTEROL SULFATE 2.5; .5 MG/3ML; MG/3ML
3 SOLUTION RESPIRATORY (INHALATION) ONCE
Status: DISCONTINUED | OUTPATIENT
Start: 2022-01-01 | End: 2022-01-01

## 2022-01-01 RX ORDER — SODIUM CHLORIDE FOR INHALATION 0.9 %
3 VIAL, NEBULIZER (ML) INHALATION
Status: DISCONTINUED | OUTPATIENT
Start: 2022-01-01 | End: 2022-01-01 | Stop reason: HOSPADM

## 2022-01-01 RX ORDER — GABAPENTIN 100 MG/1
100 CAPSULE ORAL
Status: DISCONTINUED | OUTPATIENT
Start: 2022-01-01 | End: 2022-01-01 | Stop reason: HOSPADM

## 2022-01-01 RX ORDER — LEVALBUTEROL INHALATION SOLUTION 0.63 MG/3ML
0.63 SOLUTION RESPIRATORY (INHALATION) 2 TIMES DAILY
Status: DISCONTINUED | OUTPATIENT
Start: 2022-01-01 | End: 2022-01-01

## 2022-01-01 RX ORDER — PANTOPRAZOLE SODIUM 40 MG/1
40 TABLET, DELAYED RELEASE ORAL
Status: DISCONTINUED | OUTPATIENT
Start: 2022-01-01 | End: 2022-01-01 | Stop reason: HOSPADM

## 2022-01-01 RX ORDER — ALBUTEROL SULFATE 2.5 MG/3ML
SOLUTION RESPIRATORY (INHALATION)
Status: COMPLETED
Start: 2022-01-01 | End: 2022-01-01

## 2022-01-01 RX ORDER — VANCOMYCIN HYDROCHLORIDE 500 MG/100ML
12.5 INJECTION, SOLUTION INTRAVENOUS EVERY 12 HOURS
Status: DISCONTINUED | OUTPATIENT
Start: 2022-01-01 | End: 2022-01-01

## 2022-01-01 RX ORDER — GUAIFENESIN 600 MG/1
600 TABLET, EXTENDED RELEASE ORAL EVERY 12 HOURS SCHEDULED
Status: DISCONTINUED | OUTPATIENT
Start: 2022-01-01 | End: 2022-01-01

## 2022-01-01 RX ORDER — LANOLIN ALCOHOL/MO/W.PET/CERES
3 CREAM (GRAM) TOPICAL
Status: DISCONTINUED | OUTPATIENT
Start: 2022-01-01 | End: 2022-01-01 | Stop reason: HOSPADM

## 2022-01-01 RX ORDER — CEFDINIR 300 MG/1
300 CAPSULE ORAL EVERY 12 HOURS SCHEDULED
Qty: 10 CAPSULE | Refills: 0 | Status: SHIPPED | OUTPATIENT
Start: 2022-01-01 | End: 2022-01-01

## 2022-01-01 RX ORDER — MONTELUKAST SODIUM 10 MG/1
10 TABLET ORAL
Status: DISCONTINUED | OUTPATIENT
Start: 2022-01-01 | End: 2022-01-01 | Stop reason: HOSPADM

## 2022-01-01 RX ORDER — IPRATROPIUM BROMIDE AND ALBUTEROL SULFATE 2.5; .5 MG/3ML; MG/3ML
3 SOLUTION RESPIRATORY (INHALATION)
Status: DISCONTINUED | OUTPATIENT
Start: 2022-01-01 | End: 2022-01-01

## 2022-01-01 RX ORDER — CEFDINIR 300 MG/1
300 CAPSULE ORAL EVERY 12 HOURS SCHEDULED
Status: DISCONTINUED | OUTPATIENT
Start: 2022-01-01 | End: 2022-01-01 | Stop reason: HOSPADM

## 2022-01-01 RX ORDER — IPRATROPIUM BROMIDE AND ALBUTEROL SULFATE 2.5; .5 MG/3ML; MG/3ML
SOLUTION RESPIRATORY (INHALATION)
Status: COMPLETED
Start: 2022-01-01 | End: 2022-01-01

## 2022-01-01 RX ORDER — LEVALBUTEROL 1.25 MG/.5ML
1.25 SOLUTION, CONCENTRATE RESPIRATORY (INHALATION)
Status: DISCONTINUED | OUTPATIENT
Start: 2022-01-01 | End: 2022-01-01

## 2022-01-01 RX ORDER — LEVALBUTEROL INHALATION SOLUTION 0.63 MG/3ML
0.63 SOLUTION RESPIRATORY (INHALATION) 2 TIMES DAILY
Qty: 180 ML | Refills: 0 | Status: CANCELLED | OUTPATIENT
Start: 2022-01-01

## 2022-01-01 RX ORDER — SODIUM CHLORIDE FOR INHALATION 3 %
4 VIAL, NEBULIZER (ML) INHALATION
Status: DISCONTINUED | OUTPATIENT
Start: 2022-01-01 | End: 2022-01-01

## 2022-01-01 RX ORDER — ATORVASTATIN CALCIUM 10 MG/1
10 TABLET, FILM COATED ORAL
Status: DISCONTINUED | OUTPATIENT
Start: 2022-01-01 | End: 2022-01-01 | Stop reason: HOSPADM

## 2022-01-01 RX ORDER — DULOXETIN HYDROCHLORIDE 20 MG/1
20 CAPSULE, DELAYED RELEASE ORAL DAILY
Status: DISCONTINUED | OUTPATIENT
Start: 2022-01-01 | End: 2022-01-01 | Stop reason: HOSPADM

## 2022-01-01 RX ORDER — GUAIFENESIN 600 MG/1
600 TABLET, EXTENDED RELEASE ORAL EVERY 12 HOURS SCHEDULED
Qty: 30 TABLET | Refills: 1 | Status: SHIPPED | OUTPATIENT
Start: 2022-01-01

## 2022-01-01 RX ORDER — ATORVASTATIN CALCIUM 10 MG/1
10 TABLET, FILM COATED ORAL
Qty: 90 TABLET | Refills: 1 | Status: SHIPPED | OUTPATIENT
Start: 2022-01-01 | End: 2022-01-01

## 2022-01-01 RX ORDER — METOPROLOL TARTRATE 5 MG/5ML
5 INJECTION INTRAVENOUS EVERY 6 HOURS
Status: DISCONTINUED | OUTPATIENT
Start: 2022-01-01 | End: 2022-01-01

## 2022-01-01 RX ORDER — LEVOTHYROXINE SODIUM 88 UG/1
88 TABLET ORAL DAILY
Qty: 90 TABLET | Refills: 1 | Status: SHIPPED | OUTPATIENT
Start: 2022-01-01

## 2022-01-01 RX ORDER — LISINOPRIL 10 MG/1
10 TABLET ORAL DAILY
Status: DISCONTINUED | OUTPATIENT
Start: 2022-01-01 | End: 2022-01-01

## 2022-01-01 RX ORDER — LEVOTHYROXINE SODIUM 88 UG/1
88 TABLET ORAL
Status: DISCONTINUED | OUTPATIENT
Start: 2022-01-01 | End: 2022-01-01 | Stop reason: HOSPADM

## 2022-01-01 RX ORDER — SODIUM CHLORIDE FOR INHALATION 0.9 %
3 VIAL, NEBULIZER (ML) INHALATION
Status: DISCONTINUED | OUTPATIENT
Start: 2022-01-01 | End: 2022-01-01

## 2022-01-01 RX ORDER — SODIUM CHLORIDE 9 MG/ML
50 INJECTION, SOLUTION INTRAVENOUS CONTINUOUS
Status: DISCONTINUED | OUTPATIENT
Start: 2022-01-01 | End: 2022-01-01 | Stop reason: HOSPADM

## 2022-01-01 RX ORDER — LEVALBUTEROL INHALATION SOLUTION 0.63 MG/3ML
0.63 SOLUTION RESPIRATORY (INHALATION) EVERY 6 HOURS PRN
Status: DISCONTINUED | OUTPATIENT
Start: 2022-01-01 | End: 2022-01-01

## 2022-01-01 RX ORDER — ASPIRIN 81 MG/1
81 TABLET ORAL DAILY
Status: DISCONTINUED | OUTPATIENT
Start: 2022-01-01 | End: 2022-01-01 | Stop reason: HOSPADM

## 2022-01-01 RX ORDER — LISINOPRIL 10 MG/1
10 TABLET ORAL DAILY
Status: DISCONTINUED | OUTPATIENT
Start: 2022-01-01 | End: 2022-01-01 | Stop reason: HOSPADM

## 2022-01-01 RX ORDER — DONEPEZIL HYDROCHLORIDE 10 MG/1
10 TABLET, FILM COATED ORAL
Status: DISCONTINUED | OUTPATIENT
Start: 2022-01-01 | End: 2022-01-01 | Stop reason: HOSPADM

## 2022-01-01 RX ORDER — ENOXAPARIN SODIUM 100 MG/ML
40 INJECTION SUBCUTANEOUS DAILY
Status: DISCONTINUED | OUTPATIENT
Start: 2022-01-01 | End: 2022-01-01 | Stop reason: HOSPADM

## 2022-01-01 RX ORDER — LISINOPRIL 10 MG/1
10 TABLET ORAL DAILY
Qty: 60 TABLET | Refills: 0 | Status: SHIPPED | OUTPATIENT
Start: 2022-01-01

## 2022-01-01 RX ORDER — PREDNISONE 50 MG/1
50 TABLET ORAL DAILY
Qty: 5 TABLET | Refills: 0 | Status: SHIPPED | OUTPATIENT
Start: 2022-01-01 | End: 2022-01-01

## 2022-01-01 RX ORDER — LEVALBUTEROL 1.25 MG/.5ML
1.25 SOLUTION, CONCENTRATE RESPIRATORY (INHALATION) 2 TIMES DAILY
Status: DISCONTINUED | OUTPATIENT
Start: 2022-01-01 | End: 2022-01-01 | Stop reason: HOSPADM

## 2022-01-01 RX ORDER — MONTELUKAST SODIUM 10 MG/1
10 TABLET ORAL
Qty: 90 TABLET | Refills: 1 | Status: SHIPPED | OUTPATIENT
Start: 2022-01-01

## 2022-01-01 RX ADMIN — CEFDINIR 300 MG: 300 CAPSULE ORAL at 21:33

## 2022-01-01 RX ADMIN — ENOXAPARIN SODIUM 40 MG: 40 INJECTION SUBCUTANEOUS at 08:33

## 2022-01-01 RX ADMIN — DONEPEZIL HYDROCHLORIDE 10 MG: 10 TABLET ORAL at 21:44

## 2022-01-01 RX ADMIN — ALBUTEROL SULFATE 2.5 MG: 2.5 SOLUTION RESPIRATORY (INHALATION) at 17:57

## 2022-01-01 RX ADMIN — Medication 12.5 MG: at 21:33

## 2022-01-01 RX ADMIN — PIPERACILLIN AND TAZOBACTAM 4.5 G: 36; 4.5 INJECTION, POWDER, FOR SOLUTION INTRAVENOUS at 12:31

## 2022-01-01 RX ADMIN — DULOXETINE 20 MG: 20 CAPSULE, DELAYED RELEASE ORAL at 09:36

## 2022-01-01 RX ADMIN — PIPERACILLIN AND TAZOBACTAM 4.5 G: 36; 4.5 INJECTION, POWDER, FOR SOLUTION INTRAVENOUS at 23:31

## 2022-01-01 RX ADMIN — Medication 3 MG: at 21:50

## 2022-01-01 RX ADMIN — PIPERACILLIN AND TAZOBACTAM 4.5 G: 36; 4.5 INJECTION, POWDER, FOR SOLUTION INTRAVENOUS at 17:44

## 2022-01-01 RX ADMIN — DONEPEZIL HYDROCHLORIDE 10 MG: 10 TABLET ORAL at 21:33

## 2022-01-01 RX ADMIN — GUAIFENESIN 600 MG: 600 TABLET, EXTENDED RELEASE ORAL at 11:58

## 2022-01-01 RX ADMIN — LEVOTHYROXINE SODIUM 88 MCG: 88 TABLET ORAL at 05:23

## 2022-01-01 RX ADMIN — ENOXAPARIN SODIUM 40 MG: 40 INJECTION SUBCUTANEOUS at 08:18

## 2022-01-01 RX ADMIN — VANCOMYCIN HYDROCHLORIDE 500 MG: 500 INJECTION, SOLUTION INTRAVENOUS at 13:12

## 2022-01-01 RX ADMIN — IPRATROPIUM BROMIDE AND ALBUTEROL SULFATE 3 ML: 2.5; .5 SOLUTION RESPIRATORY (INHALATION) at 08:19

## 2022-01-01 RX ADMIN — ISODIUM CHLORIDE 3 ML: 0.03 SOLUTION RESPIRATORY (INHALATION) at 21:58

## 2022-01-01 RX ADMIN — GABAPENTIN 100 MG: 100 CAPSULE ORAL at 21:11

## 2022-01-01 RX ADMIN — LEVALBUTEROL 1.25 MG: 1.25 SOLUTION, CONCENTRATE RESPIRATORY (INHALATION) at 19:48

## 2022-01-01 RX ADMIN — ISODIUM CHLORIDE 3 ML: 0.03 SOLUTION RESPIRATORY (INHALATION) at 08:37

## 2022-01-01 RX ADMIN — PIPERACILLIN AND TAZOBACTAM 4.5 G: 36; 4.5 INJECTION, POWDER, FOR SOLUTION INTRAVENOUS at 05:17

## 2022-01-01 RX ADMIN — LEVOTHYROXINE SODIUM 88 MCG: 88 TABLET ORAL at 05:17

## 2022-01-01 RX ADMIN — DULOXETINE 20 MG: 20 CAPSULE, DELAYED RELEASE ORAL at 09:18

## 2022-01-01 RX ADMIN — ISODIUM CHLORIDE 3 ML: 0.03 SOLUTION RESPIRATORY (INHALATION) at 20:01

## 2022-01-01 RX ADMIN — PANTOPRAZOLE SODIUM 40 MG: 40 TABLET, DELAYED RELEASE ORAL at 05:17

## 2022-01-01 RX ADMIN — LISINOPRIL 10 MG: 10 TABLET ORAL at 00:16

## 2022-01-01 RX ADMIN — ENOXAPARIN SODIUM 40 MG: 40 INJECTION SUBCUTANEOUS at 09:18

## 2022-01-01 RX ADMIN — ASPIRIN 81 MG: 81 TABLET, COATED ORAL at 09:35

## 2022-01-01 RX ADMIN — GUAIFENESIN 400 MG: 100 SOLUTION ORAL at 19:05

## 2022-01-01 RX ADMIN — DONEPEZIL HYDROCHLORIDE 10 MG: 10 TABLET ORAL at 21:50

## 2022-01-01 RX ADMIN — ISODIUM CHLORIDE 3 ML: 0.03 SOLUTION RESPIRATORY (INHALATION) at 13:37

## 2022-01-01 RX ADMIN — Medication 3 MG: at 21:44

## 2022-01-01 RX ADMIN — Medication 12.5 MG: at 08:33

## 2022-01-01 RX ADMIN — ENOXAPARIN SODIUM 40 MG: 40 INJECTION SUBCUTANEOUS at 09:35

## 2022-01-01 RX ADMIN — Medication 12.5 MG: at 08:54

## 2022-01-01 RX ADMIN — IPRATROPIUM BROMIDE AND ALBUTEROL SULFATE 3 ML: 2.5; .5 SOLUTION RESPIRATORY (INHALATION) at 22:09

## 2022-01-01 RX ADMIN — DULOXETINE 20 MG: 20 CAPSULE, DELAYED RELEASE ORAL at 08:33

## 2022-01-01 RX ADMIN — Medication 12.5 MG: at 21:11

## 2022-01-01 RX ADMIN — LEVALBUTEROL HYDROCHLORIDE 1.25 MG: 1.25 SOLUTION, CONCENTRATE RESPIRATORY (INHALATION) at 13:37

## 2022-01-01 RX ADMIN — GABAPENTIN 100 MG: 100 CAPSULE ORAL at 21:44

## 2022-01-01 RX ADMIN — SODIUM CHLORIDE 75 ML/HR: 0.9 INJECTION, SOLUTION INTRAVENOUS at 00:41

## 2022-01-01 RX ADMIN — ATORVASTATIN CALCIUM 10 MG: 10 TABLET, FILM COATED ORAL at 21:33

## 2022-01-01 RX ADMIN — GABAPENTIN 100 MG: 100 CAPSULE ORAL at 21:50

## 2022-01-01 RX ADMIN — GABAPENTIN 100 MG: 100 CAPSULE ORAL at 21:34

## 2022-01-01 RX ADMIN — MONTELUKAST 10 MG: 10 TABLET, FILM COATED ORAL at 21:11

## 2022-01-01 RX ADMIN — LISINOPRIL 10 MG: 10 TABLET ORAL at 08:33

## 2022-01-01 RX ADMIN — VANCOMYCIN HYDROCHLORIDE 500 MG: 500 INJECTION, SOLUTION INTRAVENOUS at 09:53

## 2022-01-01 RX ADMIN — LEVALBUTEROL HYDROCHLORIDE 1.25 MG: 1.25 SOLUTION, CONCENTRATE RESPIRATORY (INHALATION) at 21:58

## 2022-01-01 RX ADMIN — SODIUM CHLORIDE 75 ML/HR: 0.9 INJECTION, SOLUTION INTRAVENOUS at 17:44

## 2022-01-01 RX ADMIN — PANTOPRAZOLE SODIUM 40 MG: 40 TABLET, DELAYED RELEASE ORAL at 06:24

## 2022-01-01 RX ADMIN — Medication 12.5 MG: at 09:20

## 2022-01-01 RX ADMIN — VANCOMYCIN HYDROCHLORIDE 750 MG: 750 INJECTION, SOLUTION INTRAVENOUS at 22:36

## 2022-01-01 RX ADMIN — PIPERACILLIN AND TAZOBACTAM 4.5 G: 36; 4.5 INJECTION, POWDER, FOR SOLUTION INTRAVENOUS at 05:31

## 2022-01-01 RX ADMIN — ISODIUM CHLORIDE 3 ML: 0.03 SOLUTION RESPIRATORY (INHALATION) at 19:48

## 2022-01-01 RX ADMIN — VANCOMYCIN HYDROCHLORIDE 500 MG: 500 INJECTION, SOLUTION INTRAVENOUS at 21:44

## 2022-01-01 RX ADMIN — ATORVASTATIN CALCIUM 10 MG: 10 TABLET, FILM COATED ORAL at 21:11

## 2022-01-01 RX ADMIN — MONTELUKAST 10 MG: 10 TABLET, FILM COATED ORAL at 21:44

## 2022-01-01 RX ADMIN — CEFDINIR 300 MG: 300 CAPSULE ORAL at 16:04

## 2022-01-01 RX ADMIN — ASPIRIN 81 MG: 81 TABLET, COATED ORAL at 08:33

## 2022-01-01 RX ADMIN — PIPERACILLIN AND TAZOBACTAM 4.5 G: 36; 4.5 INJECTION, POWDER, FOR SOLUTION INTRAVENOUS at 00:08

## 2022-01-01 RX ADMIN — ATORVASTATIN CALCIUM 10 MG: 10 TABLET, FILM COATED ORAL at 21:44

## 2022-01-01 RX ADMIN — PIPERACILLIN AND TAZOBACTAM 4.5 G: 36; 4.5 INJECTION, POWDER, FOR SOLUTION INTRAVENOUS at 11:54

## 2022-01-01 RX ADMIN — CEFDINIR 300 MG: 300 CAPSULE ORAL at 08:55

## 2022-01-01 RX ADMIN — ISODIUM CHLORIDE 3 ML: 0.03 SOLUTION RESPIRATORY (INHALATION) at 14:53

## 2022-01-01 RX ADMIN — Medication 12.5 MG: at 21:48

## 2022-01-01 RX ADMIN — LEVOTHYROXINE SODIUM 88 MCG: 88 TABLET ORAL at 06:24

## 2022-01-01 RX ADMIN — LEVALBUTEROL HYDROCHLORIDE 1.25 MG: 1.25 SOLUTION, CONCENTRATE RESPIRATORY (INHALATION) at 08:37

## 2022-01-01 RX ADMIN — PIPERACILLIN AND TAZOBACTAM 4.5 G: 36; 4.5 INJECTION, POWDER, FOR SOLUTION INTRAVENOUS at 00:41

## 2022-01-01 RX ADMIN — ASPIRIN 81 MG: 81 TABLET, COATED ORAL at 09:18

## 2022-01-01 RX ADMIN — Medication 3 MG: at 21:33

## 2022-01-01 RX ADMIN — AZITHROMYCIN MONOHYDRATE 500 MG: 500 INJECTION, POWDER, LYOPHILIZED, FOR SOLUTION INTRAVENOUS at 03:03

## 2022-01-01 RX ADMIN — PANTOPRAZOLE SODIUM 40 MG: 40 TABLET, DELAYED RELEASE ORAL at 05:23

## 2022-01-01 RX ADMIN — LEVALBUTEROL HYDROCHLORIDE 1.25 MG: 1.25 SOLUTION, CONCENTRATE RESPIRATORY (INHALATION) at 14:53

## 2022-01-01 RX ADMIN — CEFEPIME 2000 MG: 2 INJECTION, POWDER, FOR SOLUTION INTRAVENOUS at 22:36

## 2022-01-01 RX ADMIN — ATORVASTATIN CALCIUM 10 MG: 10 TABLET, FILM COATED ORAL at 21:50

## 2022-01-01 RX ADMIN — PIPERACILLIN AND TAZOBACTAM 4.5 G: 36; 4.5 INJECTION, POWDER, FOR SOLUTION INTRAVENOUS at 11:58

## 2022-01-01 RX ADMIN — MONTELUKAST 10 MG: 10 TABLET, FILM COATED ORAL at 21:50

## 2022-01-01 RX ADMIN — GUAIFENESIN 400 MG: 100 SOLUTION ORAL at 11:55

## 2022-01-01 RX ADMIN — DONEPEZIL HYDROCHLORIDE 10 MG: 10 TABLET ORAL at 21:11

## 2022-01-01 RX ADMIN — PIPERACILLIN AND TAZOBACTAM 4.5 G: 36; 4.5 INJECTION, POWDER, FOR SOLUTION INTRAVENOUS at 05:23

## 2022-01-01 RX ADMIN — DULOXETINE 20 MG: 20 CAPSULE, DELAYED RELEASE ORAL at 08:56

## 2022-01-01 RX ADMIN — Medication 3 MG: at 21:11

## 2022-01-01 RX ADMIN — LEVALBUTEROL 1.25 MG: 1.25 SOLUTION, CONCENTRATE RESPIRATORY (INHALATION) at 08:22

## 2022-01-01 RX ADMIN — Medication 12.5 MG: at 09:35

## 2022-01-01 RX ADMIN — PIPERACILLIN AND TAZOBACTAM 4.5 G: 36; 4.5 INJECTION, POWDER, FOR SOLUTION INTRAVENOUS at 17:32

## 2022-01-01 RX ADMIN — ISODIUM CHLORIDE 3 ML: 0.03 SOLUTION RESPIRATORY (INHALATION) at 08:22

## 2022-01-01 RX ADMIN — ASPIRIN 81 MG: 81 TABLET, COATED ORAL at 08:54

## 2022-01-01 RX ADMIN — MONTELUKAST 10 MG: 10 TABLET, FILM COATED ORAL at 21:34

## 2022-01-01 RX ADMIN — SODIUM CHLORIDE 75 ML/HR: 0.9 INJECTION, SOLUTION INTRAVENOUS at 13:10

## 2022-01-01 RX ADMIN — LISINOPRIL 10 MG: 10 TABLET ORAL at 08:55

## 2022-01-01 RX ADMIN — PIPERACILLIN AND TAZOBACTAM 4.5 G: 36; 4.5 INJECTION, POWDER, FOR SOLUTION INTRAVENOUS at 16:25

## 2022-01-01 RX ADMIN — LEVALBUTEROL HYDROCHLORIDE 1.25 MG: 1.25 SOLUTION, CONCENTRATE RESPIRATORY (INHALATION) at 20:01

## 2022-01-01 RX ADMIN — ENOXAPARIN SODIUM 40 MG: 40 INJECTION SUBCUTANEOUS at 08:55

## 2022-01-01 RX ADMIN — Medication 12.5 MG: at 21:50

## 2022-01-06 ENCOUNTER — TELEPHONE (OUTPATIENT)
Dept: FAMILY MEDICINE CLINIC | Facility: CLINIC | Age: 87
End: 2022-01-06

## 2022-01-06 NOTE — TELEPHONE ENCOUNTER
----- Message from Gerhardt Hedger on behalf of Jacquie Gordon sent at 1/4/2022 11:06 AM EST -----  Regarding: Jacquie Gordon   This message is being sent by Gerhardt Hedger on behalf of Zenaidahikiana Gordon  Jill Sellers, its granddaughter writing that she has a UTI  Urine dip done at home positive leuks no nitrates trace of proton SG 1 030 and of blood  Can a urine culture and antibiotic be ordered and sent to [facility] pharmacy please  Thank you   Gerhardt Hedger (on behalf of iDevices[employer] Gordon)  [phone]

## 2022-01-27 ENCOUNTER — TELEPHONE (OUTPATIENT)
Dept: FAMILY MEDICINE CLINIC | Facility: CLINIC | Age: 87
End: 2022-01-27

## 2022-01-27 DIAGNOSIS — R32 URINARY INCONTINENCE, UNSPECIFIED TYPE: Primary | ICD-10-CM

## 2022-01-27 DIAGNOSIS — R32 URINARY INCONTINENCE, UNSPECIFIED TYPE: ICD-10-CM

## 2022-01-27 DIAGNOSIS — Z23 ENCOUNTER FOR IMMUNIZATION: Primary | ICD-10-CM

## 2022-01-27 RX ORDER — INCONTINENCE PAD,LINER,DISP
EACH MISCELLANEOUS 2 TIMES DAILY
Qty: 48 EACH | Refills: 5 | Status: SHIPPED | OUTPATIENT
Start: 2022-01-27 | End: 2022-02-01 | Stop reason: SDUPTHER

## 2022-01-27 RX ORDER — UNDERPADS 23" X 36"
EACH MISCELLANEOUS
Qty: 200 EACH | Refills: 5 | Status: SHIPPED | OUTPATIENT
Start: 2022-01-27 | End: 2022-02-01 | Stop reason: SDUPTHER

## 2022-01-27 NOTE — TELEPHONE ENCOUNTER
----- Message from Bubba Villalba on behalf of Rome Pinedo sent at 1/27/2022  4:03 PM EST -----  Regarding: Rx for Rome Pinedo  This message is being sent by Bubba Villalba on behalf of Rome Pinedo  Also I forgot to add for a RX for overnight pads, long length, extra absorbent ( we were using poise size 6)     Bubba Villalba CRNP  [phone]

## 2022-01-27 NOTE — TELEPHONE ENCOUNTER
----- Message from Gifford Dancer on behalf of Kelton Sanchez sent at 1/27/2022  3:45 PM EST -----  Regarding: Prescriptions  This message is being sent by Gifford Dancer on behalf of Kelton Sanchez  I am writing on behalf of Kelton Sanchez [facility]) in regards to about possibility of writing RX for her for urinary incontinence supplies  RX for overnight diapers, pulls ups (depends), adult wipes (mckesson we have been using), skin barrier at night (nutrashield) that we put on her bottom, and sacral patches to protect her from any breakdown being that she is wheelchair bound, non ambulatory  (duoderm I believe/ with tegaderm)  She has no open wounds doing it all preventative  If this is possible I would use [id number] [facility] in [place]  Is there any paper work that I would need to fill out or is it RX  Call me if any questions [phone]  Thank you for your time       Gifford Dancer, CRNP

## 2022-01-27 NOTE — TELEPHONE ENCOUNTER
I called pt grand daughter and she requested hydrocellular foam dressing 4 x 4 (allevyn) to prevent bed sores due to sitting all day with little movement in chair, wipes with aloe 6 pK box of 100 wipes by [employer], medium night time diapers and medium pull ups  I sent all requested inconstance items to [facility]

## 2022-02-01 ENCOUNTER — TELEPHONE (OUTPATIENT)
Dept: FAMILY MEDICINE CLINIC | Facility: CLINIC | Age: 87
End: 2022-02-01

## 2022-02-01 DIAGNOSIS — R32 URINARY INCONTINENCE, UNSPECIFIED TYPE: ICD-10-CM

## 2022-02-01 RX ORDER — INCONTINENCE PAD,LINER,DISP
EACH MISCELLANEOUS 2 TIMES DAILY
Qty: 48 EACH | Refills: 5 | Status: SHIPPED | OUTPATIENT
Start: 2022-02-01 | End: 2022-02-04

## 2022-02-01 RX ORDER — UNDERPADS 23" X 36"
EACH MISCELLANEOUS
Qty: 200 EACH | Refills: 5 | Status: SHIPPED | OUTPATIENT
Start: 2022-02-01 | End: 2022-02-04

## 2022-02-01 NOTE — TELEPHONE ENCOUNTER
Call from Gerhardt Jairocourtney, granddaughter taking care of patient, said we faxed all scripts for supplies to [facility] but they do not deal with her insurance so is asking if the scripts can be printed and she will  on Thursday afternoon    She needs the first 6 scripts on patient med list   Let her know if there are any problems

## 2022-02-04 RX ORDER — INCONTINENCE PAD,LINER,DISP
EACH MISCELLANEOUS 2 TIMES DAILY
Qty: 48 EACH | Refills: 5 | Status: SHIPPED | OUTPATIENT
Start: 2022-02-04 | End: 2022-02-17 | Stop reason: SDUPTHER

## 2022-02-04 RX ORDER — UNDERPADS 23" X 36"
EACH MISCELLANEOUS
Qty: 200 EACH | Refills: 5 | Status: SHIPPED | OUTPATIENT
Start: 2022-02-04

## 2022-02-04 NOTE — TELEPHONE ENCOUNTER
Message in voice mail from [facility], states they received orders for [facility] ups & pads  However, the orders are signed by Popeye Bishop  Please call [facility] at [phone]

## 2022-02-16 ENCOUNTER — TELEPHONE (OUTPATIENT)
Dept: FAMILY MEDICINE CLINIC | Facility: CLINIC | Age: 87
End: 2022-02-16

## 2022-02-16 ENCOUNTER — APPOINTMENT (OUTPATIENT)
Dept: LAB | Age: 87
End: 2022-02-16
Payer: MEDICARE

## 2022-02-16 NOTE — TELEPHONE ENCOUNTER
Please see below message that pt grand daughter is requesting an antibiotic for UTI  She will send urine and there is a picture of urine dip stick in her media   Please advise

## 2022-02-16 NOTE — TELEPHONE ENCOUNTER
----- Message from Gifford Dancer on behalf of Kelton Sanchez sent at 2/16/2022  8:48 AM EST -----  Regarding: Kelton Sanchez -UTI symptoms   This message is being sent by Gifford Dancer on behalf of Kelton Sanchez  Today I checked my grandmas urine for she was urinating less and her attitude was off  I know there is an order already for a urine culture so I will send that to lab today but would you be able to send in antibiotic for her  Attached is urine dip results from [facility] and picture of specimen       Gifford Dancer (granddaughter)

## 2022-02-17 ENCOUNTER — TELEPHONE (OUTPATIENT)
Dept: FAMILY MEDICINE CLINIC | Facility: CLINIC | Age: 87
End: 2022-02-17

## 2022-02-17 DIAGNOSIS — R32 URINARY INCONTINENCE, UNSPECIFIED TYPE: ICD-10-CM

## 2022-02-17 RX ORDER — INCONTINENCE PAD,LINER,DISP
EACH MISCELLANEOUS 2 TIMES DAILY
Qty: 100 EACH | Refills: 5 | Status: SHIPPED | OUTPATIENT
Start: 2022-02-17 | End: 2022-05-13 | Stop reason: SDUPTHER

## 2022-02-17 RX ORDER — INCONTINENCE PAD,LINER,DISP
EACH MISCELLANEOUS 2 TIMES DAILY
Qty: 100 EACH | Refills: 5 | Status: SHIPPED | OUTPATIENT
Start: 2022-02-17 | End: 2022-02-17

## 2022-02-17 NOTE — TELEPHONE ENCOUNTER
Call from [facility] asking to redo the script for Poise pads    We sent #48 and they are asking for #100    She said patient's insurance allows for #300/month for these supplies so between briefs, pull-ups and pads they need 100 of the pads

## 2022-03-09 ENCOUNTER — APPOINTMENT (OUTPATIENT)
Dept: LAB | Age: 87
End: 2022-03-09
Payer: MEDICARE

## 2022-04-13 ENCOUNTER — RA CDI HCC (OUTPATIENT)
Dept: OTHER | Facility: HOSPITAL | Age: 87
End: 2022-04-13

## 2022-04-13 NOTE — PROGRESS NOTES
[facility]  coding opportunities     I71 4, J84 10 and E11 51     Chart Reviewed number of suggestions sent to Provider: 3     Patients Insurance     Medicare Insurance: Medicare

## 2022-04-19 ENCOUNTER — OFFICE VISIT (OUTPATIENT)
Dept: FAMILY MEDICINE CLINIC | Facility: CLINIC | Age: 87
End: 2022-04-19
Payer: MEDICARE

## 2022-04-19 VITALS
RESPIRATION RATE: 20 BRPM | TEMPERATURE: 97.8 F | HEART RATE: 64 BPM | WEIGHT: 94.4 LBS | DIASTOLIC BLOOD PRESSURE: 82 MMHG | OXYGEN SATURATION: 90 % | HEIGHT: 60 IN | SYSTOLIC BLOOD PRESSURE: 138 MMHG | BODY MASS INDEX: 18.53 KG/M2

## 2022-04-19 DIAGNOSIS — E11.9 TYPE 2 DIABETES MELLITUS WITH HEMOGLOBIN A1C GOAL OF LESS THAN 7.0% (HCC): ICD-10-CM

## 2022-04-19 DIAGNOSIS — I25.119 CORONARY ARTERY DISEASE INVOLVING NATIVE CORONARY ARTERY OF NATIVE HEART WITH ANGINA PECTORIS (HCC): ICD-10-CM

## 2022-04-19 DIAGNOSIS — I10 PRIMARY HYPERTENSION: ICD-10-CM

## 2022-04-19 DIAGNOSIS — M79.671 FOOT PAIN, BILATERAL: Primary | ICD-10-CM

## 2022-04-19 DIAGNOSIS — E44.1 MILD PROTEIN-CALORIE MALNUTRITION (HCC): ICD-10-CM

## 2022-04-19 DIAGNOSIS — E03.8 OTHER SPECIFIED HYPOTHYROIDISM: ICD-10-CM

## 2022-04-19 DIAGNOSIS — Z00.00 MEDICARE ANNUAL WELLNESS VISIT, SUBSEQUENT: ICD-10-CM

## 2022-04-19 DIAGNOSIS — F02.80 DEMENTIA DUE TO MEDICAL CONDITION WITHOUT BEHAVIORAL DISTURBANCE (HCC): ICD-10-CM

## 2022-04-19 DIAGNOSIS — J44.9 COPD, SEVERITY TO BE DETERMINED (HCC): ICD-10-CM

## 2022-04-19 DIAGNOSIS — M79.672 FOOT PAIN, BILATERAL: Primary | ICD-10-CM

## 2022-04-19 DIAGNOSIS — I73.9 PERIPHERAL VASCULAR DISEASE (HCC): ICD-10-CM

## 2022-04-19 PROCEDURE — 99214 OFFICE O/P EST MOD 30 MIN: CPT | Performed by: FAMILY MEDICINE

## 2022-04-19 PROCEDURE — G0439 PPPS, SUBSEQ VISIT: HCPCS | Performed by: FAMILY MEDICINE

## 2022-04-19 PROCEDURE — 1123F ACP DISCUSS/DSCN MKR DOCD: CPT | Performed by: FAMILY MEDICINE

## 2022-04-19 RX ORDER — SENNOSIDES 8.6 MG
650 CAPSULE ORAL EVERY 8 HOURS PRN
COMMUNITY

## 2022-04-19 RX ORDER — ALBUTEROL SULFATE 2.5 MG/3ML
SOLUTION RESPIRATORY (INHALATION)
COMMUNITY
Start: 2022-03-29

## 2022-04-19 RX ORDER — GABAPENTIN 100 MG/1
100 CAPSULE ORAL 3 TIMES DAILY
Qty: 90 CAPSULE | Refills: 3 | Status: SHIPPED | OUTPATIENT
Start: 2022-04-19

## 2022-04-19 NOTE — PROGRESS NOTES
Assessment and Plan:     Problem List Items Addressed This Visit        Endocrine    Type 2 diabetes mellitus with hemoglobin A1c goal of less than 7 0% ([facility])       Lab Results   Component Value Date    HGBA1C 5 9 (H) 09/07/2021   Her last A1c was in September of last year  Well she is to continue without medications  Family does not desire monitoring her sugar anymore  Hypothyroidism     Continue on Synthroid  We will continue to monitor TSH  Respiratory    COPD, severity to be determined ([facility] [address] )     Fair control  Continue same  Will continue to monitor  Relevant Medications    albuterol (2 5 mg/3 mL) 0 083 % nebulizer solution       Cardiovascular and Mediastinum    CAD (coronary artery disease), native coronary artery     Stable  Asymptomatic  Will continue to monitor  Peripheral vascular disease (HCC)     Asymptomatic  Will continue to monitor  Hypertension     Well controlled  Continue same  Will continue to monitor  Nervous and Auditory    Dementia due to medical condition without behavioral disturbance ([facility])     Worsening  Continue same  Will continue to monitor  Other    Mild protein-calorie malnutrition ([facility] [address] )     Malnutrition Findings:                       increase protein in her diet  BMI Findings: Body mass index is 18 44 kg/m²  Medicare annual wellness visit, subsequent     Was discussed about immunizations, nutrition and safety measures  Foot pain, bilateral - Primary     Continue follow-up with podiatrist   She was given prescription for Neurontin  Discussed about possible side effects  Relevant Medications    gabapentin (Neurontin) 100 mg capsule        BMI Counseling: Body mass index is 18 44 kg/m²  The BMI is below normal  Patient advised to gain weight  Rationale for BMI follow-up plan is due to patient being underweight       Depression Screening and Follow-up Plan: Patient was screened for depression during today's encounter  They screened negative with a PHQ-2 score of 0  Falls Plan of Care: balance, strength, and gait training instructions were provided  Preventive health issues were discussed with patient, and age appropriate screening tests were ordered as noted in patient's After Visit Summary  Personalized health advice and appropriate referrals for health education or preventive services given if needed, as noted in patient's After Visit Summary  History of Present Illness:     Patient presents for Medicare Annual Wellness visit    Patient Care Team:  Josué Gage MD as PCP - General (Family Medicine)  Bernice Arriaga MD     Problem List:     Patient Active Problem List   Diagnosis    Left upper lobe pneumonia    Type 2 diabetes mellitus with hemoglobin A1c goal of less than 7 0% ([facility] [address] )    CAD (coronary artery disease), native coronary artery    Intermittent asthma    Abdominal aortic aneurysm (AAA) 3 0 cm to 5 0 cm in diameter in female ([facility] [address] )    Hypothyroidism    Dyslipidemia, goal LDL below 70    Toxic metabolic encephalopathy    Pulmonary fibrosis ([address] )    Dementia due to medical condition without behavioral disturbance ([facility] [address] )    Mononeuritis of lower limb    Peripheral vascular disease ([facility] [address] )    Squamous cell carcinoma of right thigh    Mild protein-calorie malnutrition ([address] )    COPD, severity to be determined ([facility] [address] )    Hypertension    Medicare annual wellness visit, subsequent    Foot pain, bilateral      Past Medical and Surgical History:     Past Medical History:   Diagnosis Date    Bell's palsy remote    Coronary artery disease     Diabetes mellitus ([address]     Disease of thyroid gland     GERD (gastroesophageal reflux disease)     Hip fracture ([address] )     Hyperlipidemia     Hypertension     Psychiatric disorder     Stroke [facility])      History reviewed  No pertinent surgical history     Family History:     Family History   Problem Relation Age of Onset    Diabetes Mother     Cancer Father     Cancer Sister     Cancer Brother       Social History:     Social History     Socioeconomic History    Marital status: Single     Spouse name: None    Number of children: None    Years of education: None    Highest education level: None   Occupational History    None   Tobacco Use    Smoking status: Former Smoker    Smokeless tobacco: Never Used   Vaping Use    Vaping Use: Never used   Substance and Sexual Activity    Alcohol use: No    Drug use: No    Sexual activity: None   Other Topics Concern    None   Social History Narrative    None     Social Determinants of Health     Financial Resource Strain: Not on file   Food Insecurity: Not on file   Transportation Needs: Not on file   Physical Activity: Not on file   Stress: Not on file   Social Connections: Not on file   Intimate Partner Violence: Not on file   Housing Stability: Not on file      Medications and Allergies:     Current Outpatient Medications   Medication Sig Dispense Refill    acetaminophen (TYLENOL) 650 mg CR tablet Take 650 mg by mouth every 8 (eight) hours as needed for mild pain      albuterol (2 5 mg/3 mL) 0 083 % nebulizer solution TAKE 3ML BY NEBULIZER EVERY 6 HOURS AS  NEEDED FOR WHEEZING OR SHORTNESS OR BREATH      ALPRAZolam (XANAX) 0 5 mg tablet Take 1 tablet (0 5 mg total) by mouth daily at bedtime as needed for anxiety 30 tablet 3    aspirin (ECOTRIN LOW STRENGTH) 81 mg EC tablet Take 81 mg by mouth daily      atorvastatin (LIPITOR) 10 mg tablet Take 10 mg by mouth daily at bedtime      dimethicone 1 % cream Apply topically 2 (two) times a day as needed for dry skin 113 g 2    Docusate Sodium 100 MG capsule Take by mouth      donepezil (ARICEPT) 10 mg tablet Take 10 mg by mouth daily at bedtime      DULoxetine (CYMBALTA) 20 mg capsule Take 20 mg by mouth daily      Incontinence Supplies MISC Pt uses up to 3 packs of 100 wipes per week 4 each 5    Incontinence Supply Disposable (Incontinence Brief Medium) MISC Pt uses 5 pullups daily as needed 200 each 5    Incontinence Supply Disposable (Poise Maximum Absorbency) PADS Use 2 (two) times a day Size 6 long 100 each 5    Incontinence Supply Disposable MISC Pt uses 3 at night as needed 100 each 5    levothyroxine 100 mcg tablet Take 100 mcg by mouth daily      melatonin 1 mg Take 1 mg by mouth daily at bedtime      metoprolol tartrate (LOPRESSOR) 25 mg tablet Take 12 5 mg by mouth 2 (two) times a day      montelukast (SINGULAIR) 10 mg tablet Take 10 mg by mouth daily at bedtime      nitroglycerin (NITROSTAT) 0 4 mg SL tablet Place 0 4 mg under the tongue every 5 (five) minutes as needed for chest pain      omeprazole (PriLOSEC) 20 mg delayed release capsule Take 20 mg by mouth daily      gabapentin (Neurontin) 100 mg capsule Take 1 capsule (100 mg total) by mouth 3 (three) times a day 90 capsule 3    Gauze Pads & Dressings (AMD Foam Dressing) 4"X4" PADS Use 2 (two) times a day (Patient not taking: Reported on 4/19/2022 ) 40 each 5     No current facility-administered medications for this visit  No Known Allergies   Immunizations:     Immunization History   Administered Date(s) Administered    COVID-19 PFIZER VACCINE 0 3 ML IM 01/11/2021, 02/21/2021, 10/26/2021    INFLUENZA 10/19/2011, 09/12/2012, 12/26/2013, 11/06/2014, 03/14/2016, 10/06/2016    Influenza, high dose seasonal 0 7 mL 11/19/2021    Pneumococcal Conjugate 13-Valent 07/14/2016, 11/07/2018    Pneumococcal Polysaccharide PPV23 01/21/2011    Tdap 04/10/2015      Health Maintenance: There are no preventive care reminders to display for this patient  There are no preventive care reminders to display for this patient     Medicare Health Risk Assessment:     /82 (BP Location: Left arm, Patient Position: Sitting, Cuff Size: Adult)   Pulse 64   Temp 97 8 °F (36 6 °C) (Tympanic)   Resp 20   Ht 5' (1 524 m)   Wt 42 8 kg (94 lb 6 4 oz)   SpO2 90%   BMI 18 44 kg/m²      Bill is here for her Subsequent Wellness visit  Last Medicare Wellness visit information reviewed, patient interviewed and updates made to the record today  Health Risk Assessment:   Patient rates overall health as good  Patient feels that their physical health rating is slightly worse  Patient is satisfied with their life  Eyesight was rated as much worse  Hearing was rated as same  Patient feels that their emotional and mental health rating is slightly better  Patients states they are never, rarely angry  Patient states they are often unusually tired/fatigued  Pain experienced in the last 7 days has been some  Patient's pain rating has been 5/10  Foot pain, see's Dr Demarcus Huynh     Depression Screening:   PHQ-2 Score: 0      Fall Risk Screening: In the past year, patient has experienced: no history of falling in past year      Urinary Incontinence Screening:   Patient has leaked urine accidently in the last six months  Home Safety:  Patient has trouble with stairs inside or outside of their home  Patient has working smoke alarms and has no working carbon monoxide detector  Home safety hazards include: none  Nutrition:   Current diet is Regular  Medications:   Patient is currently taking over-the-counter supplements  OTC medications include: see medication list  Patient is not able to manage medications  Activities of Daily Living (ADLs)/Instrumental Activities of Daily Living (IADLs):   Walk and transfer into and out of bed and chair?: No  Dress and groom yourself?: No    Bathe or shower yourself?: No    Feed yourself?  No  Do your laundry/housekeeping?: No  Manage your money, pay your bills and track your expenses?: No  Make your own meals?: No    Do your own shopping?: No    ADL comments: Pt has health aid to assist in ADL's  Resides with granddaughter    Previous Hospitalizations:   Any hospitalizations or ED visits within the last 12 months?: Yes How many hospitalizations have you had in the last year?: 1-2    Advance Care Planning:   Living will: No    Durable POA for healthcare: No    Advanced directive: No      Cognitive Screening:   Provider or family/friend/caregiver concerned regarding cognition?: No    PREVENTIVE SCREENINGS      Cardiovascular Screening:    General: Screening Not Indicated and History Lipid Disorder      Diabetes Screening:     General: Screening Not Indicated and History Diabetes      Colorectal Cancer Screening:     General: Screening Not Indicated      Breast Cancer Screening:     General: Screening Not Indicated      Cervical Cancer Screening:    General: Screening Not Indicated      Osteoporosis Screening:    General: Risks and Benefits Discussed      Abdominal Aortic Aneurysm (AAA) Screening:        General: Screening Not Indicated and History AAA      Lung Cancer Screening:     General: Screening Not Indicated      Hepatitis C Screening:    General: Risks and Benefits Discussed    Screening, Brief Intervention, and Referral to Treatment (SBIRT)    Screening  Typical number of drinks in a day: 0  Typical number of drinks in a week: 0  Interpretation: Low risk drinking behavior  Single Item Drug Screening:  How often have you used an illegal drug (including marijuana) or a prescription medication for non-medical reasons in the past year? never    Single Item Drug Screen Score: 0  Interpretation: Negative screen for possible drug use disorder    Brief Intervention  Alcohol & drug use screenings were reviewed  No concerns regarding substance use disorder identified         Ahmet Bergman MD

## 2022-04-19 NOTE — PATIENT INSTRUCTIONS
Medicare Preventive Visit Patient Instructions  Thank you for completing your Welcome to Medicare Visit or Medicare Annual Wellness Visit today  Your next wellness visit will be due in one year (4/20/2023)  The screening/preventive services that you may require over the next 5-10 years are detailed below  Some tests may not apply to you based off risk factors and/or age  Screening tests ordered at today's visit but not completed yet may show as past due  Also, please note that scanned in results may not display below  Preventive Screenings:  Service Recommendations Previous Testing/Comments   Colorectal Cancer Screening  * Colonoscopy    * Fecal Occult Blood Test (FOBT)/Fecal Immunochemical Test (FIT)  * Fecal DNA/Cologuard Test  * Flexible Sigmoidoscopy Age: 54-65 years old   Colonoscopy: every 10 years (may be performed more frequently if at higher risk)  OR  FOBT/FIT: every 1 year  OR  Cologuard: every 3 years  OR  Sigmoidoscopy: every 5 years  Screening may be recommended earlier than age 48 if at higher risk for colorectal cancer  Also, an individualized decision between you and your healthcare provider will decide whether screening between the ages of 74-80 would be appropriate  Colonoscopy: Not on file  FOBT/FIT: Not on file  Cologuard: Not on file  Sigmoidoscopy: Not on file    Screening Not Indicated     Breast Cancer Screening Age: 36 years old  Frequency: every 1-2 years  Not required if history of left and right mastectomy Mammogram: Not on file        Cervical Cancer Screening Between the ages of 21-29, pap smear recommended once every 3 years  Between the ages of 33-67, can perform pap smear with HPV co-testing every 5 years     Recommendations may differ for women with a history of total hysterectomy, cervical cancer, or abnormal pap smears in past  Pap Smear: Not on file    Screening Not Indicated   Hepatitis C Screening Once for adults born between 1945 and 1965  More frequently in patients at high risk for Hepatitis C Hep C Antibody: Not on file        Diabetes Screening 1-2 times per year if you're at risk for diabetes or have pre-diabetes Fasting glucose: 89 mg/dL   A1C: 5 9 %    Screening Not Indicated  History Diabetes   Cholesterol Screening Once every 5 years if you don't have a lipid disorder  May order more often based on risk factors  Lipid panel: 09/07/2021    Screening Not Indicated  History Lipid Disorder     Other Preventive Screenings Covered by Medicare:  1  Abdominal Aortic Aneurysm (AAA) Screening: covered once if your at risk  You're considered to be at risk if you have a family history of AAA  2  Lung Cancer Screening: covers low dose CT scan once per year if you meet all of the following conditions: (1) Age 50-69; (2) No signs or symptoms of lung cancer; (3) Current smoker or have quit smoking within the last 15 years; (4) You have a tobacco smoking history of at least 30 pack years (packs per day multiplied by number of years you smoked); (5) You get a written order from a healthcare provider  3  Glaucoma Screening: covered annually if you're considered high risk: (1) You have diabetes OR (2) Family history of glaucoma OR (3)  aged 48 and older OR (3)  American aged 72 and older  3  Osteoporosis Screening: covered every 2 years if you meet one of the following conditions: (1) You're estrogen deficient and at risk for osteoporosis based off medical history and other findings; (2) Have a vertebral abnormality; (3) On glucocorticoid therapy for more than 3 months; (4) Have primary hyperparathyroidism; (5) On osteoporosis medications and need to assess response to drug therapy  · Last bone density test (DXA Scan): Not on file  5  HIV Screening: covered annually if you're between the age of 12-76  Also covered annually if you are younger than 13 and older than 72 with risk factors for HIV infection   For pregnant patients, it is covered up to 3 times per pregnancy  Immunizations:  Immunization Recommendations   Influenza Vaccine Annual influenza vaccination during flu season is recommended for all persons aged >= 6 months who do not have contraindications   Pneumococcal Vaccine (Prevnar and Pneumovax)  * Prevnar = PCV13  * Pneumovax = PPSV23   Adults 25-60 years old: 1-3 doses may be recommended based on certain risk factors  Adults 72 years old: Prevnar (PCV13) vaccine recommended followed by Pneumovax (PPSV23) vaccine  If already received PPSV23 since turning 65, then PCV13 recommended at least one year after PPSV23 dose  Hepatitis B Vaccine 3 dose series if at intermediate or high risk (ex: diabetes, end stage renal disease, liver disease)   Tetanus (Td) Vaccine - COST NOT COVERED BY MEDICARE PART B Following completion of primary series, a booster dose should be given every 10 years to maintain immunity against tetanus  Td may also be given as tetanus wound prophylaxis  Tdap Vaccine - COST NOT COVERED BY MEDICARE PART B Recommended at least once for all adults  For pregnant patients, recommended with each pregnancy  Shingles Vaccine (Shingrix) - COST NOT COVERED BY MEDICARE PART B  2 shot series recommended in those aged 48 and above     Health Maintenance Due:  There are no preventive care reminders to display for this patient  Immunizations Due:  There are no preventive care reminders to display for this patient  Advance Directives   What are advance directives? Advance directives are legal documents that state your wishes and plans for medical care  These plans are made ahead of time in case you lose your ability to make decisions for yourself  Advance directives can apply to any medical decision, such as the treatments you want, and if you want to donate organs  What are the types of advance directives? There are many types of advance directives, and each state has rules about how to use them   You may choose a combination of any of the following:  · Living will: This is a written record of the treatment you want  You can also choose which treatments you do not want, which to limit, and which to stop at a certain time  This includes surgery, medicine, IV fluid, and tube feedings  · Durable power of  for healthcare [place] [facility]): This is a written record that states who you want to make healthcare choices for you when you are unable to make them for yourself  This person, called a proxy, is usually a family member or a friend  You may choose more than 1 proxy  · Do not resuscitate (DNR) order:  A DNR order is used in case your heart stops beating or you stop breathing  It is a request not to have certain forms of treatment, such as CPR  A DNR order may be included in other types of advance directives  · Medical directive: This covers the care that you want if you are in a coma, near death, or unable to make decisions for yourself  You can list the treatments you want for each condition  Treatment may include pain medicine, surgery, blood transfusions, dialysis, IV or tube feedings, and a ventilator (breathing machine)  · Values history: This document has questions about your views, beliefs, and how you feel and think about life  This information can help others choose the care that you would choose  Why are advance directives important? An advance directive helps you control your care  Although spoken wishes may be used, it is better to have your wishes written down  Spoken wishes can be misunderstood, or not followed  Treatments may be given even if you do not want them  An advance directive may make it easier for your family to make difficult choices about your care  Urinary Incontinence   Urinary incontinence (UI)  is when you lose control of your bladder  UI develops because your bladder cannot store or empty urine properly  The 3 most common types of UI are stress incontinence, urge incontinence, or both    Medicines:   · May be given to help strengthen your bladder control  Report any side effects of medication to your healthcare provider  Do pelvic muscle exercises often:  Your pelvic muscles help you stop urinating  Squeeze these muscles tight for 5 seconds, then relax for 5 seconds  Gradually work up to squeezing for 10 seconds  Do 3 sets of 15 repetitions a day, or as directed  This will help strengthen your pelvic muscles and improve bladder control  Train your bladder:  Go to the bathroom at set times, such as every 2 hours, even if you do not feel the urge to go  You can also try to hold your urine when you feel the urge to go  For example, hold your urine for 5 minutes when you feel the urge to go  As that becomes easier, hold your urine for 10 minutes  Self-care:   · Keep a UI record  Write down how often you leak urine and how much you leak  Make a note of what you were doing when you leaked urine  · Drink liquids as directed  You may need to limit the amount of liquid you drink to help control your urine leakage  Do not drink any liquid right before you go to bed  Limit or do not have drinks that contain caffeine or alcohol  · Prevent constipation  Eat a variety of high-fiber foods  Good examples are high-fiber cereals, beans, vegetables, and whole-grain breads  Walking is the best way to trigger your intestines to have a bowel movement  · Exercise regularly and maintain a healthy weight  Weight loss and exercise will decrease pressure on your bladder and help you control your leakage  · Use a catheter as directed  to help empty your bladder  A catheter is a tiny, plastic tube that is put into your bladder to drain your urine  · Go to behavior therapy as directed  Behavior therapy may be used to help you learn to control your urge to urinate  Underweight  Underweight is defined as having a body mass index (BMI) of less than 18 5 kg/m2   Anorexia  is a loss of appetite, decreased food intake, or both   Your appetite naturally decreases as you get older  You also get full faster than you used to  This occurs because your body needs less energy  Other body changes can also lead to a decreased appetite  Even though some appetite loss is normal, you still need to get enough calories and nutrients to keep you healthy  You can start to lose too much weight if you do not eat as much food as your body needs  Unwanted weight loss can cause health problems, or worsen health problems you already have  You can also become dehydrated if you do not drink enough liquid  How to eat healthy and get enough nutrients:   · Choose healthy foods  Eat a variety of fruits, vegetables, whole grains, low-fat dairy foods, lean meats, and other protein foods  Limit foods high in fat, sugar, and salt  Limit or avoid alcohol as directed  Work with a dietitian to help you plan your meals if you need to follow a special diet  A dietitian can also teach you how to modify foods if you have trouble chewing or swallowing  · Snack on healthy foods between meals  if you only eat a small amount during meals  Snacks provide extra healthy nutrients and calories between meals  Examples include fruit, cheese, and whole grain crackers  · Drink liquids as directed  to avoid dehydration  Drink liquids between meals if they cause you to get full too quickly during meals  Ask how much liquid to drink each day and which liquids are best for you  · Use herbs, spices, and flavor enhancers to add flavor to foods  Avoid using herbs and spice blends that also contain sodium  Ask your healthcare provider or dietitian about flavor enhancers  Flavor enhancers with ham, natural fowler, and roast beef flavors can also be sprinkled on food to add flavor  · Share meals with others as often as you can  Eating with others may help you to eat better during meal time  Ask family members, neighbors, or friends to join you for lunch   There are also senior centers where you can meet people, and share meals with them  · Ask family and friends for help  with shopping or preparing foods  Ask for a ride to the grocery store, if needed  © Copyright [employer] 2018 Information is for End User's use only and may not be sold, redistributed or otherwise used for commercial purposes   All illustrations and images included in CareNotes® are the copyrighted property of A [employer] M [employer]  or [address] [place] [address]

## 2022-04-26 PROBLEM — Z00.00 MEDICARE ANNUAL WELLNESS VISIT, SUBSEQUENT: Status: ACTIVE | Noted: 2022-04-26

## 2022-04-26 PROBLEM — M79.672 FOOT PAIN, BILATERAL: Status: ACTIVE | Noted: 2022-04-26

## 2022-04-26 PROBLEM — M79.671 FOOT PAIN, BILATERAL: Status: ACTIVE | Noted: 2022-01-01

## 2022-04-26 NOTE — ASSESSMENT & PLAN NOTE
Malnutrition Findings:                       increase protein in her diet  BMI Findings: Body mass index is 18 44 kg/m²

## 2022-04-26 NOTE — ASSESSMENT & PLAN NOTE
Continue follow-up with podiatrist   She was given prescription for Neurontin  Discussed about possible side effects

## 2022-04-26 NOTE — ASSESSMENT & PLAN NOTE
Lab Results   Component Value Date    HGBA1C 5 9 (H) 09/07/2021   Her last A1c was in September of last year  Well she is to continue without medications  Family does not desire monitoring her sugar anymore

## 2022-05-13 DIAGNOSIS — R32 URINARY INCONTINENCE, UNSPECIFIED TYPE: ICD-10-CM

## 2022-05-13 RX ORDER — INCONTINENCE PAD,LINER,DISP
EACH MISCELLANEOUS 2 TIMES DAILY
Qty: 300 EACH | Refills: 5 | Status: SHIPPED | OUTPATIENT
Start: 2022-05-13

## 2022-05-13 NOTE — TELEPHONE ENCOUNTER
Phone call from [facility], refill Poise pads  They are requesting the script be changed to a quantity of 300 with refills

## 2022-06-15 ENCOUNTER — TELEPHONE (OUTPATIENT)
Dept: FAMILY MEDICINE CLINIC | Facility: CLINIC | Age: 87
End: 2022-06-15

## 2022-06-15 NOTE — TELEPHONE ENCOUNTER
Message from [address] [place] [address] on Tuesday 6/14 at 9:37am    Morteza Wilson it was her second request for poise pads  Quantity is increased to 300, which is allowed per month  No longer using the other incontinence supplies  Also asking for refills      Uses more than twice per day

## 2022-06-22 ENCOUNTER — APPOINTMENT (OUTPATIENT)
Dept: LAB | Age: 87
End: 2022-06-22
Payer: MEDICARE

## 2022-06-24 DIAGNOSIS — F41.9 ANXIETY: ICD-10-CM

## 2022-06-27 RX ORDER — ALPRAZOLAM 0.5 MG/1
TABLET ORAL
Qty: 30 TABLET | Refills: 3 | Status: SHIPPED | OUTPATIENT
Start: 2022-06-27 | End: 2022-06-28 | Stop reason: SDUPTHER

## 2022-06-27 NOTE — TELEPHONE ENCOUNTER
Alprazolam refill sent to provider  opal; Patients      Select Patient Id Name D O B   R Fay 106   [] Malcolm [facility] [address] Criteria    Name Date of Birth Date Range   Joaquín Kirby 46[birth year]     Requester Name Requested Date   Eliseo Galeano Jun 27 2022 08:50:27 GMT-0400 Samia Tony Daylight Time)     Summary   Prescriptions  9  Prescribers  1  Pharmacies  1  View Map    Drug Classes   Benzodiazepines  9  Stimulants  0  Opioids  0  Muscle Relaxants  0    Opioid Dosage   Total MME for Active Prescriptions  0    Average MME  0 00  MME Graph   MME Calculator       · Prescriptions  · Notifications    · Prescribers  · Pharmacies  · MME Graph      [] PA   Drug Categories:      [] Benzodiazepines       Show  entries  Search:  Patient Id Prescription #  Filled Written Drug Label Qty Days Strength MME** Prescriber Pharmacy Payment REFILL #/Auth State Detail   1  [id number] 06/14/2022 03/07/2022 ALPRAZolam (Tablet)  30 0 30 0 5 MG NA VASQUEZ) [address] [place] [address] #Replaced by [facility]  Medicare 3 / 3 PA    1  [id number] 05/16/2022 03/07/2022 ALPRAZolam (Tablet)  30 0 30 0 5 MG NA VASQUEZ) [address] [place] [address] #Replaced by [facility]  Medicare 02 / 3 PA    1  [id number] 04/07/2022 03/07/2022 ALPRAZolam (Tablet)  30 0 30 0 5 MG

## 2022-06-28 DIAGNOSIS — F41.9 ANXIETY: ICD-10-CM

## 2022-06-28 RX ORDER — ALPRAZOLAM 0.5 MG/1
0.5 TABLET ORAL
Qty: 30 TABLET | Refills: 3 | Status: SHIPPED | OUTPATIENT
Start: 2022-06-28 | End: 2022-08-01 | Stop reason: SDUPTHER

## 2022-06-28 NOTE — TELEPHONE ENCOUNTER
Call from Dheeraj who said that when they went to download the script for xanax for this patient the signature did not come through so is asking if it can be resent

## 2022-06-28 NOTE — TELEPHONE ENCOUNTER
Alprazolam request sent to provider  Verified with  [facility] signature did not go through on previous script  PMED;  Patient Prescription Report        Patients      Select Patient Id Name D O B   R Fay 106   [] Malcolm [facility] [address] [facility] [address] Criteria    Name Date of Birth Date Range   Bonnie Tejada 37- [birth year] To [birth year]-     Requester Name Requested Date   Lindsay Jefferson Tue Jun 28 2022 13:53:56 GMT-0400 Jesusita Cos Daylight Time)     Summary   Prescriptions  9  Prescribers  1  Pharmacies  1  View Map    Drug Classes   Benzodiazepines  9  Stimulants  0  Opioids  0  Muscle Relaxants  0    Opioid Dosage   Total MME for Active Prescriptions  0    Average MME  0 00  MME Graph   MME Calculator       · Prescriptions  · Notifications    · Prescribers  · Pharmacies  · MME Graph      [] PA   Drug Categories:      [] Benzodiazepines       Show  entries  Search:  Patient Id Prescription #  Filled Written Drug Label Qty Days Strength MME** Prescriber Pharmacy Payment REFILL #/Auth State Detail   1  [id number] 06/14/2022 03/07/2022 ALPRAZolam (Tablet)  30 0 30 0 5 MG NA VASQUEZ) [address] [place] [address] [address][id number]  Medicare 3 / 3 PA    1  [id number] 05/16/2022 03/07/2022 ALPRAZolam (Tablet)  30 0 30 0 5 MG NA VASQUEZ) [address] [place] [address] [address]0763  Medicare 02 / 3 PA    1  [id number] 04/07/2022 03/07/2022 ALPRAZolam (Tablet)  30 0

## 2022-07-31 DIAGNOSIS — E03.8 OTHER SPECIFIED HYPOTHYROIDISM: ICD-10-CM

## 2022-07-31 DIAGNOSIS — F02.80 DEMENTIA DUE TO MEDICAL CONDITION WITHOUT BEHAVIORAL DISTURBANCE (HCC): ICD-10-CM

## 2022-07-31 DIAGNOSIS — E78.49 OTHER HYPERLIPIDEMIA: ICD-10-CM

## 2022-07-31 DIAGNOSIS — I10 PRIMARY HYPERTENSION: Primary | ICD-10-CM

## 2022-07-31 DIAGNOSIS — F41.9 ANXIETY: ICD-10-CM

## 2022-08-01 DIAGNOSIS — J44.9 COPD, SEVERITY TO BE DETERMINED (HCC): Primary | ICD-10-CM

## 2022-08-01 DIAGNOSIS — E78.49 OTHER HYPERLIPIDEMIA: ICD-10-CM

## 2022-08-01 RX ORDER — ASPIRIN 81 MG/1
81 TABLET ORAL DAILY
Qty: 90 TABLET | Refills: 1 | Status: SHIPPED | OUTPATIENT
Start: 2022-08-01

## 2022-08-01 RX ORDER — LEVOTHYROXINE SODIUM 0.1 MG/1
100 TABLET ORAL DAILY
Qty: 90 TABLET | Refills: 1 | Status: SHIPPED | OUTPATIENT
Start: 2022-08-01 | End: 2022-09-27 | Stop reason: SDUPTHER

## 2022-08-01 RX ORDER — ALPRAZOLAM 0.5 MG/1
0.5 TABLET ORAL
Qty: 30 TABLET | Refills: 3 | Status: SHIPPED | OUTPATIENT
Start: 2022-08-01 | End: 2022-10-18

## 2022-08-01 RX ORDER — MONTELUKAST SODIUM 10 MG/1
10 TABLET ORAL
Qty: 90 TABLET | Refills: 1 | Status: SHIPPED | OUTPATIENT
Start: 2022-08-01 | End: 2022-08-01 | Stop reason: SDUPTHER

## 2022-08-01 RX ORDER — DONEPEZIL HYDROCHLORIDE 10 MG/1
10 TABLET, FILM COATED ORAL
Qty: 90 TABLET | Refills: 1 | Status: SHIPPED | OUTPATIENT
Start: 2022-08-01 | End: 2022-10-16

## 2022-08-01 RX ORDER — OMEPRAZOLE 20 MG/1
20 CAPSULE, DELAYED RELEASE ORAL DAILY
Qty: 90 CAPSULE | Refills: 1 | Status: SHIPPED | OUTPATIENT
Start: 2022-08-01

## 2022-08-01 RX ORDER — ATORVASTATIN CALCIUM 10 MG/1
10 TABLET, FILM COATED ORAL
Qty: 90 TABLET | Refills: 1 | Status: SHIPPED | OUTPATIENT
Start: 2022-08-01 | End: 2022-08-01 | Stop reason: SDUPTHER

## 2022-08-01 RX ORDER — DULOXETIN HYDROCHLORIDE 20 MG/1
20 CAPSULE, DELAYED RELEASE ORAL DAILY
Qty: 90 CAPSULE | Refills: 1 | Status: SHIPPED | OUTPATIENT
Start: 2022-08-01

## 2022-08-01 NOTE — TELEPHONE ENCOUNTER
Message from Pallavi Mancilla, daughter, who said she is appreciative of us refilling her mother's meds but we forgot two of them      Montelukast and atorvastatin

## 2022-08-01 NOTE — TELEPHONE ENCOUNTER
From: Danny Dailey  To: Office of Robb Garcia MD  Sent: 7/31/2022 12:52 PM EDT  Subject: Medication Renewal Request    Refills have been requested for the following medications: Other - Metoprolol 25 mg - take 1/2 tablet in am and pm, Levothyroxine 100mcg daily, duloxetine 20mg daily, aspirin 81mg daily, donepezil 10mg take one at bedtime , atorvastatin 10mg take one at bedtime , omeprazole 20mg take daily, montelukast 10mg take at bedtime  She has been taking all these meds but needs to be refilled for her last pcp gave her 90 day supply with three refills  Can you prescribe them to the [facility] in [place] the same way please  Also she needs refill on her albuterol   Thanks    [facility] [address]    This message is being sent by Bebeto Hall on behalf of Danny Dailey    Medication renewals requested in this message routed separately:     ALPRAZolam Finneydean Cid) 0 5 mg tablet [Steffany Sharp]

## 2022-08-01 NOTE — TELEPHONE ENCOUNTER
Last seen 4/19/22 refilled for 6 months only and I copied past refills into chart from pdmp web site and sent to provider for approval      [address] RAHMAN -[address] [address] [facility]    Name Date of Birth Date Range   juan antonio peñaloza [birth year]- To [birth year]-     Requester Name Requested Date   [employer] Aug 01 2022 08:51:30 GMT-0400 Nayan Lupe Daylight Time)     Summary   Prescriptions  10  Prescribers  1  Pharmacies  1  View Map    Drug Classes   Benzodiazepines  10  Stimulants  0  Opioids  0  Muscle Relaxants  0    Opioid Dosage   Total MME for Active Prescriptions  0    Average MME  0 00  MME Graph   MME Calculator       · Prescriptions  · Notifications    · Prescribers  · Pharmacies  · MME Graph      [] PA   Drug Categories:      [] Benzodiazepines       Show  entries  Search:  Patient Id Prescription #  Filled Written Drug Label Qty Days Strength MME** Prescriber Pharmacy Payment REFILL #/Auth State Detail   1  [id number] 07/12/2022 06/28/2022 ALPRAZolam (Tablet)  30 0 30 0 5 MG NA WASSIM(MD) [facility] #[id number]  Medicare 0 / 3 PA    1  [id number] 06/14/2022 03/07/2022 ALPRAZolam (Tablet)  30 0 30 0 5 MG NA WASSIM(MD) [address] [place] [address] #[facility]  Medicare 3 / 3 PA    1  [id number] 05/16/2022 03/07/2022 ALPRAZolam (Tablet)  30 0 30 0 5 MG NA WASSIM(MD) [address] [place] [address] [address]6331  Medicare 02 / 3 PA    1  [id number] 04/07/2022 03/07/2022 ALPRAZolam (Tablet)  30 0 30 0 5 MG NA WASSIM(MD) [facility] #[id number]  Medicare 01 / 3 PA    1  [id number] 03/07/2022 03/07/2022 ALPRAZolam (Tablet)  30 0 30 0 5 MG NA WASSIM(MD) [address] [place] [address] [address]633  Medicare 0 / 3 PA    1  [id number] 02/01/2022  11/10/2021 ALPRAZolam (Tablet)  30 0 30 0 5 MG NA WASSIM(MD) [address] [place] [address] [address]6339  Medicare 02 / 2 PA    1  [id number] 12/23/2021  11/10/2021 ALPRAZolam (Tablet)  30 0 30 0 5 MG NA VASQUEZ) [address] [facility] #[facility]  Medicare 01 / 2 PA 1  [id number] 11/10/2021  11/10/2021 ALPRAZolam (Tablet)  30 0 30 0 5 MG NA WASSIM(MD) [facility] #[id number]  Commercial Insurance 0 / 2 PA    1  [id number] 11/09/2021  10/12/2021 ALPRAZolam (Tablet)  23 0 23 0 25 MG NA WASSIM(MD) [address]  Commercial Insurance 01 / 2 PA    1  [id number] 10/12/2021  10/12/2021 ALPRAZolam (Tablet)  30 0 30 0 25 MG NA WASSIM(MD) [address] [facility] #7808  Commercial Insurance 0 / 2 PA    Showing 1 to 10 of 10 entries  [id number]    · Prescriptions  · Notifications    · Prescribers  · Pharmacies  · MME Graph      [] PA    Drug Categories:      [] Benzodiazepines       Show  entries  Search:     ALPRAZOLAM (Tablet)  23 days   Written: 10-  Filled: 10-    Qty: 23 0  Strength: 0 25 MG    NDC:  [id number]      ALPRAZOLAM (Tablet)  30 days   Written: 03-  Filled: 03-    Qty: 30 0  Strength: 0 5 MG    NDC:  [id number]      ALPRAZOLAM (Tablet)  30 days   Written: 03-  Filled: 03-    Qty: 30 0  Strength: 0 5 MG    NDC:  [id number]      ALPRAZOLAM (Tablet)  30 days   Written: 03-  Filled: 03-    Qty: 30 0  Strength: 0 5 MG    NDC:  [id number]      ALPRAZOLAM (Tablet)  30 days   Written: 03-  Filled: 03-    Qty: 30 0  Strength: 0 5 MG    NDC:  [id number]      ALPRAZOLAM (Tablet)  30 days   Written: 06-  Filled: 06-    Qty: 30 0  Strength: 0 5 MG    NDC:  [id number]      ALPRAZOLAM (Tablet)  30 days   Written: 10-  Filled: 10-    Qty: 30 0  Strength: 0 25 MG    NDC:  [id number]      ALPRAZOLAM (Tablet)  30 days   Written: 11-  Filled: 11-    Qty: 30 0  Strength: 0 5 MG    NDC:  [id number]      ALPRAZOLAM (Tablet)  30 days   Written: 11-  Filled: 11-    Qty: 30 0  Strength: 0 5 MG    NDC:  [id number]      ALPRAZOLAM (Tablet)  30 days   Written: 11-  Filled: 11-    Qty: 30 0  Strength: 0 5 MG    NDC:  [id number] Showing 1 to 10 of 10 entries  [id number]       * Per CDC guidance, the conversion factors and associated daily morphine milligram equivalents for drugs prescribed as part of medication-assisted treatment for opioid use disorder should not be used to benchmark against dosage thresholds meant for opioids prescribed for pain  Report Disclaimer:  Information from the [address] Program (PDMP) database is protected health information and any information accessed must be treated as confidential  Any person who knowingly or intentionally makes an unauthorized disclosure of information from the [address] will be subject to civil and criminal penalties as set forth in the ABC-MAP Act 2014-191, Act of Oct  27, 2014, P L  2911  The information in the [address] is submitted by pharmacies and may contain errors and omissions  Independent verification of prescription information with pharmacies and prescribers may sometimes be prudent or necessary        Educational content  CDC MME calculation guidelines  [place] Prescribing Guidelines  Letter Regarding the Misapplication of Prescribing Guidelines   Guide for Appropriate Tapering or Discontinuation of Long-Term Opioid Use   #[id number]         Close Alerts

## 2022-08-24 DIAGNOSIS — M79.671 FOOT PAIN, BILATERAL: ICD-10-CM

## 2022-08-24 DIAGNOSIS — M79.672 FOOT PAIN, BILATERAL: ICD-10-CM

## 2022-08-25 RX ORDER — GABAPENTIN 100 MG/1
CAPSULE ORAL
Qty: 90 CAPSULE | Refills: 3 | Status: SHIPPED | OUTPATIENT
Start: 2022-08-25 | End: 2022-08-26

## 2022-08-25 NOTE — TELEPHONE ENCOUNTER
Pharmacy requesting refill on gabapentin  Last seen 4/19/22  Has appt 4/25/23  Medication sent to pharmacy

## 2022-08-26 DIAGNOSIS — M79.671 FOOT PAIN, BILATERAL: ICD-10-CM

## 2022-08-26 DIAGNOSIS — M79.672 FOOT PAIN, BILATERAL: ICD-10-CM

## 2022-08-26 RX ORDER — GABAPENTIN 100 MG/1
CAPSULE ORAL
Qty: 90 CAPSULE | Refills: 3 | Status: ON HOLD | OUTPATIENT
Start: 2022-08-26 | End: 2022-09-02 | Stop reason: SDUPTHER

## 2022-08-30 ENCOUNTER — HOSPITAL ENCOUNTER (INPATIENT)
Facility: HOSPITAL | Age: 87
LOS: 3 days | Discharge: HOME | DRG: 193 | End: 2022-09-02
Attending: EMERGENCY MEDICINE | Admitting: INTERNAL MEDICINE
Payer: MEDICARE

## 2022-08-30 ENCOUNTER — APPOINTMENT (OUTPATIENT)
Dept: RADIOLOGY | Facility: HOSPITAL | Age: 87
DRG: 193 | End: 2022-08-30
Payer: MEDICARE

## 2022-08-30 DIAGNOSIS — J18.9 LEFT UPPER LOBE PNEUMONIA: ICD-10-CM

## 2022-08-30 DIAGNOSIS — J18.9 PNEUMONIA: ICD-10-CM

## 2022-08-30 DIAGNOSIS — J84.10 PULMONARY FIBROSIS (HCC): Chronic | ICD-10-CM

## 2022-08-30 DIAGNOSIS — M79.671 FOOT PAIN, BILATERAL: ICD-10-CM

## 2022-08-30 DIAGNOSIS — M79.672 FOOT PAIN, BILATERAL: ICD-10-CM

## 2022-08-30 DIAGNOSIS — R09.02 HYPOXIA: Primary | ICD-10-CM

## 2022-08-30 LAB
ALBUMIN SERPL BCP-MCNC: 2.4 G/DL (ref 3.5–5)
ALP SERPL-CCNC: 153 U/L (ref 46–116)
ALT SERPL W P-5'-P-CCNC: 14 U/L (ref 12–78)
ANION GAP SERPL CALCULATED.3IONS-SCNC: 3 MMOL/L (ref 4–13)
APTT PPP: 31 SECONDS (ref 23–37)
AST SERPL W P-5'-P-CCNC: 19 U/L (ref 5–45)
ATRIAL RATE: 100 BPM
BACTERIA UR QL AUTO: ABNORMAL /HPF
BASE EX.OXY STD BLDV CALC-SCNC: 70.1 % (ref 60–80)
BASE EXCESS BLDV CALC-SCNC: 3.1 MMOL/L
BASOPHILS # BLD MANUAL: 0 THOUSAND/UL (ref 0–0.1)
BASOPHILS NFR MAR MANUAL: 0 % (ref 0–1)
BILIRUB SERPL-MCNC: 0.37 MG/DL (ref 0.2–1)
BILIRUB UR QL STRIP: NEGATIVE
BUN SERPL-MCNC: 20 MG/DL (ref 5–25)
CALCIUM ALBUM COR SERPL-MCNC: 9.9 MG/DL (ref 8.3–10.1)
CALCIUM SERPL-MCNC: 8.6 MG/DL (ref 8.3–10.1)
CHLORIDE SERPL-SCNC: 105 MMOL/L (ref 96–108)
CLARITY UR: CLEAR
CO2 SERPL-SCNC: 30 MMOL/L (ref 21–32)
COLOR UR: YELLOW
CREAT SERPL-MCNC: 0.54 MG/DL (ref 0.6–1.3)
EOSINOPHIL # BLD MANUAL: 0 THOUSAND/UL (ref 0–0.4)
EOSINOPHIL NFR BLD MANUAL: 0 % (ref 0–6)
ERYTHROCYTE [DISTWIDTH] IN BLOOD BY AUTOMATED COUNT: 14.5 % (ref 11.6–15.1)
GFR SERPL CREATININE-BSD FRML MDRD: 82 ML/MIN/1.73SQ M
GLUCOSE SERPL-MCNC: 125 MG/DL (ref 65–140)
GLUCOSE UR STRIP-MCNC: NEGATIVE MG/DL
GRAN CASTS #/AREA URNS LPF: ABNORMAL /[LPF]
HCO3 BLDV-SCNC: 28.5 MMOL/L (ref 24–30)
HCT VFR BLD AUTO: 38.6 % (ref 34.8–46.1)
HGB BLD-MCNC: 11.6 G/DL (ref 11.5–15.4)
HGB UR QL STRIP.AUTO: ABNORMAL
INR PPP: 1.04 (ref 0.84–1.19)
KETONES UR STRIP-MCNC: NEGATIVE MG/DL
LACTATE SERPL-SCNC: 1.2 MMOL/L (ref 0.5–2)
LEUKOCYTE ESTERASE UR QL STRIP: ABNORMAL
LYMPHOCYTES # BLD AUTO: 1.03 THOUSAND/UL (ref 0.6–4.47)
LYMPHOCYTES # BLD AUTO: 3 % (ref 14–44)
MCH RBC QN AUTO: 26.9 PG (ref 26.8–34.3)
MCHC RBC AUTO-ENTMCNC: 30.1 G/DL (ref 31.4–37.4)
MCV RBC AUTO: 90 FL (ref 82–98)
MONOCYTES # BLD AUTO: 1.03 THOUSAND/UL (ref 0–1.22)
MONOCYTES NFR BLD: 3 % (ref 4–12)
MUCOUS THREADS UR QL AUTO: ABNORMAL
NEUTROPHILS # BLD MANUAL: 31.58 THOUSAND/UL (ref 1.85–7.62)
NEUTS BAND NFR BLD MANUAL: 1 % (ref 0–8)
NEUTS SEG NFR BLD AUTO: 91 % (ref 43–75)
NITRITE UR QL STRIP: NEGATIVE
NON-SQ EPI CELLS URNS QL MICRO: ABNORMAL /HPF
O2 CT BLDV-SCNC: 12.4 ML/DL
P AXIS: 47 DEGREES
PCO2 BLDV: 46.8 MM HG (ref 42–50)
PH BLDV: 7.4 [PH] (ref 7.3–7.4)
PH UR STRIP.AUTO: 5.5 [PH]
PLATELET # BLD AUTO: 335 THOUSANDS/UL (ref 149–390)
PLATELET BLD QL SMEAR: ADEQUATE
PMV BLD AUTO: 9.3 FL (ref 8.9–12.7)
PO2 BLDV: 38.5 MM HG (ref 35–45)
POTASSIUM SERPL-SCNC: 4.3 MMOL/L (ref 3.5–5.3)
PR INTERVAL: 168 MS
PROCALCITONIN SERPL-MCNC: 2.58 NG/ML
PROT SERPL-MCNC: 6.7 G/DL (ref 6.4–8.4)
PROT UR STRIP-MCNC: ABNORMAL MG/DL
PROTHROMBIN TIME: 13.9 SECONDS (ref 11.6–14.5)
QRS AXIS: -4 DEGREES
QRSD INTERVAL: 84 MS
QT INTERVAL: 348 MS
QTC INTERVAL: 448 MS
RBC # BLD AUTO: 4.31 MILLION/UL (ref 3.81–5.12)
RBC #/AREA URNS AUTO: ABNORMAL /HPF
RBC MORPH BLD: NORMAL
SARS-COV-2 RNA RESP QL NAA+PROBE: NEGATIVE
SODIUM SERPL-SCNC: 138 MMOL/L (ref 135–147)
SP GR UR STRIP.AUTO: >=1.03 (ref 1–1.03)
T WAVE AXIS: 65 DEGREES
UROBILINOGEN UR STRIP-ACNC: 2 MG/DL
VARIANT LYMPHS # BLD AUTO: 2 %
VENTRICULAR RATE: 100 BPM
WBC # BLD AUTO: 34.33 THOUSAND/UL (ref 4.31–10.16)
WBC #/AREA URNS AUTO: ABNORMAL /HPF

## 2022-08-30 PROCEDURE — 85007 BL SMEAR W/DIFF WBC COUNT: CPT | Performed by: EMERGENCY MEDICINE

## 2022-08-30 PROCEDURE — 81001 URINALYSIS AUTO W/SCOPE: CPT

## 2022-08-30 PROCEDURE — 80053 COMPREHEN METABOLIC PANEL: CPT | Performed by: EMERGENCY MEDICINE

## 2022-08-30 PROCEDURE — 36415 COLL VENOUS BLD VENIPUNCTURE: CPT | Performed by: EMERGENCY MEDICINE

## 2022-08-30 PROCEDURE — 93010 ELECTROCARDIOGRAM REPORT: CPT | Performed by: INTERNAL MEDICINE

## 2022-08-30 PROCEDURE — 87086 URINE CULTURE/COLONY COUNT: CPT

## 2022-08-30 PROCEDURE — 87154 CUL TYP ID BLD PTHGN 6+ TRGT: CPT | Performed by: EMERGENCY MEDICINE

## 2022-08-30 PROCEDURE — 84145 PROCALCITONIN (PCT): CPT | Performed by: EMERGENCY MEDICINE

## 2022-08-30 PROCEDURE — 87077 CULTURE AEROBIC IDENTIFY: CPT

## 2022-08-30 PROCEDURE — 85027 COMPLETE CBC AUTOMATED: CPT | Performed by: EMERGENCY MEDICINE

## 2022-08-30 PROCEDURE — 85730 THROMBOPLASTIN TIME PARTIAL: CPT | Performed by: EMERGENCY MEDICINE

## 2022-08-30 PROCEDURE — U0005 INFEC AGEN DETEC AMPLI PROBE: HCPCS | Performed by: EMERGENCY MEDICINE

## 2022-08-30 PROCEDURE — U0003 INFECTIOUS AGENT DETECTION BY NUCLEIC ACID (DNA OR RNA); SEVERE ACUTE RESPIRATORY SYNDROME CORONAVIRUS 2 (SARS-COV-2) (CORONAVIRUS DISEASE [COVID-19]), AMPLIFIED PROBE TECHNIQUE, MAKING USE OF HIGH THROUGHPUT TECHNOLOGIES AS DESCRIBED BY CMS-2020-01-R: HCPCS | Performed by: EMERGENCY MEDICINE

## 2022-08-30 PROCEDURE — 99285 EMERGENCY DEPT VISIT HI MDM: CPT

## 2022-08-30 PROCEDURE — 99222 1ST HOSP IP/OBS MODERATE 55: CPT | Performed by: INTERNAL MEDICINE

## 2022-08-30 PROCEDURE — 82805 BLOOD GASES W/O2 SATURATION: CPT | Performed by: EMERGENCY MEDICINE

## 2022-08-30 PROCEDURE — 93005 ELECTROCARDIOGRAM TRACING: CPT

## 2022-08-30 PROCEDURE — 83605 ASSAY OF LACTIC ACID: CPT | Performed by: EMERGENCY MEDICINE

## 2022-08-30 PROCEDURE — 87081 CULTURE SCREEN ONLY: CPT | Performed by: INTERNAL MEDICINE

## 2022-08-30 PROCEDURE — 71045 X-RAY EXAM CHEST 1 VIEW: CPT

## 2022-08-30 PROCEDURE — 99291 CRITICAL CARE FIRST HOUR: CPT | Performed by: EMERGENCY MEDICINE

## 2022-08-30 PROCEDURE — 87186 SC STD MICRODIL/AGAR DIL: CPT

## 2022-08-30 PROCEDURE — 87040 BLOOD CULTURE FOR BACTERIA: CPT | Performed by: EMERGENCY MEDICINE

## 2022-08-30 PROCEDURE — 85610 PROTHROMBIN TIME: CPT | Performed by: EMERGENCY MEDICINE

## 2022-08-30 RX ORDER — ALPRAZOLAM 0.5 MG/1
0.5 TABLET ORAL
Status: DISCONTINUED | OUTPATIENT
Start: 2022-08-30 | End: 2022-09-02 | Stop reason: HOSPADM

## 2022-08-30 RX ORDER — ALBUTEROL SULFATE 2.5 MG/3ML
2.5 SOLUTION RESPIRATORY (INHALATION) EVERY 4 HOURS PRN
Status: DISCONTINUED | OUTPATIENT
Start: 2022-08-30 | End: 2022-08-30

## 2022-08-30 RX ORDER — LANOLIN ALCOHOL/MO/W.PET/CERES
1.5 CREAM (GRAM) TOPICAL
Status: DISCONTINUED | OUTPATIENT
Start: 2022-08-30 | End: 2022-09-02 | Stop reason: HOSPADM

## 2022-08-30 RX ORDER — UREA 10 %
1 LOTION (ML) TOPICAL
Status: DISCONTINUED | OUTPATIENT
Start: 2022-08-30 | End: 2022-08-30

## 2022-08-30 RX ORDER — GABAPENTIN 100 MG/1
100 CAPSULE ORAL
Status: DISCONTINUED | OUTPATIENT
Start: 2022-08-30 | End: 2022-09-02 | Stop reason: HOSPADM

## 2022-08-30 RX ORDER — DULOXETIN HYDROCHLORIDE 20 MG/1
20 CAPSULE, DELAYED RELEASE ORAL DAILY
Status: DISCONTINUED | OUTPATIENT
Start: 2022-08-31 | End: 2022-09-02 | Stop reason: HOSPADM

## 2022-08-30 RX ORDER — DONEPEZIL HYDROCHLORIDE 10 MG/1
10 TABLET, FILM COATED ORAL
Status: DISCONTINUED | OUTPATIENT
Start: 2022-08-30 | End: 2022-09-02 | Stop reason: HOSPADM

## 2022-08-30 RX ORDER — SODIUM CHLORIDE, SODIUM GLUCONATE, SODIUM ACETATE, POTASSIUM CHLORIDE, MAGNESIUM CHLORIDE, SODIUM PHOSPHATE, DIBASIC, AND POTASSIUM PHOSPHATE .53; .5; .37; .037; .03; .012; .00082 G/100ML; G/100ML; G/100ML; G/100ML; G/100ML; G/100ML; G/100ML
50 INJECTION, SOLUTION INTRAVENOUS CONTINUOUS
Status: DISPENSED | OUTPATIENT
Start: 2022-08-30 | End: 2022-08-31

## 2022-08-30 RX ORDER — SODIUM CHLORIDE FOR INHALATION 0.9 %
3 VIAL, NEBULIZER (ML) INHALATION
Status: DISCONTINUED | OUTPATIENT
Start: 2022-08-30 | End: 2022-09-02 | Stop reason: HOSPADM

## 2022-08-30 RX ORDER — SODIUM CHLORIDE, SODIUM GLUCONATE, SODIUM ACETATE, POTASSIUM CHLORIDE, MAGNESIUM CHLORIDE, SODIUM PHOSPHATE, DIBASIC, AND POTASSIUM PHOSPHATE .53; .5; .37; .037; .03; .012; .00082 G/100ML; G/100ML; G/100ML; G/100ML; G/100ML; G/100ML; G/100ML
50 INJECTION, SOLUTION INTRAVENOUS CONTINUOUS
Status: DISCONTINUED | OUTPATIENT
Start: 2022-08-30 | End: 2022-08-30

## 2022-08-30 RX ORDER — PANTOPRAZOLE SODIUM 40 MG/1
40 TABLET, DELAYED RELEASE ORAL
Status: DISCONTINUED | OUTPATIENT
Start: 2022-08-31 | End: 2022-09-02 | Stop reason: HOSPADM

## 2022-08-30 RX ORDER — LEVALBUTEROL 1.25 MG/.5ML
1.25 SOLUTION, CONCENTRATE RESPIRATORY (INHALATION)
Status: DISCONTINUED | OUTPATIENT
Start: 2022-08-30 | End: 2022-09-02 | Stop reason: HOSPADM

## 2022-08-30 RX ORDER — VANCOMYCIN HYDROCHLORIDE 1 G/200ML
1000 INJECTION, SOLUTION INTRAVENOUS ONCE
Status: COMPLETED | OUTPATIENT
Start: 2022-08-30 | End: 2022-08-30

## 2022-08-30 RX ORDER — MONTELUKAST SODIUM 10 MG/1
10 TABLET ORAL
Status: DISCONTINUED | OUTPATIENT
Start: 2022-08-30 | End: 2022-09-02 | Stop reason: HOSPADM

## 2022-08-30 RX ORDER — ALBUTEROL SULFATE 2.5 MG/3ML
2.5 SOLUTION RESPIRATORY (INHALATION)
Status: DISCONTINUED | OUTPATIENT
Start: 2022-08-30 | End: 2022-08-30

## 2022-08-30 RX ORDER — ENOXAPARIN SODIUM 100 MG/ML
40 INJECTION SUBCUTANEOUS DAILY
Status: DISCONTINUED | OUTPATIENT
Start: 2022-08-30 | End: 2022-09-02 | Stop reason: HOSPADM

## 2022-08-30 RX ORDER — ALBUTEROL SULFATE 2.5 MG/3ML
2.5 SOLUTION RESPIRATORY (INHALATION) EVERY 4 HOURS PRN
Status: DISCONTINUED | OUTPATIENT
Start: 2022-08-30 | End: 2022-09-02 | Stop reason: HOSPADM

## 2022-08-30 RX ORDER — ATORVASTATIN CALCIUM 10 MG/1
10 TABLET, FILM COATED ORAL
Status: DISCONTINUED | OUTPATIENT
Start: 2022-08-30 | End: 2022-09-02 | Stop reason: HOSPADM

## 2022-08-30 RX ORDER — GUAIFENESIN 600 MG/1
600 TABLET, EXTENDED RELEASE ORAL EVERY 12 HOURS SCHEDULED
Status: DISCONTINUED | OUTPATIENT
Start: 2022-08-30 | End: 2022-09-02 | Stop reason: HOSPADM

## 2022-08-30 RX ORDER — LEVOTHYROXINE SODIUM 0.1 MG/1
100 TABLET ORAL
Status: DISCONTINUED | OUTPATIENT
Start: 2022-08-31 | End: 2022-09-02 | Stop reason: HOSPADM

## 2022-08-30 RX ORDER — DOCUSATE SODIUM 100 MG/1
100 CAPSULE, LIQUID FILLED ORAL DAILY
Status: DISCONTINUED | OUTPATIENT
Start: 2022-08-30 | End: 2022-09-02 | Stop reason: HOSPADM

## 2022-08-30 RX ORDER — BUDESONIDE 0.5 MG/2ML
0.5 INHALANT ORAL
Status: DISCONTINUED | OUTPATIENT
Start: 2022-08-30 | End: 2022-09-02 | Stop reason: HOSPADM

## 2022-08-30 RX ORDER — ACETAMINOPHEN 325 MG/1
650 TABLET ORAL EVERY 6 HOURS PRN
Status: DISCONTINUED | OUTPATIENT
Start: 2022-08-30 | End: 2022-09-02 | Stop reason: HOSPADM

## 2022-08-30 RX ORDER — ASPIRIN 81 MG/1
81 TABLET ORAL DAILY
Status: DISCONTINUED | OUTPATIENT
Start: 2022-08-31 | End: 2022-09-02 | Stop reason: HOSPADM

## 2022-08-30 RX ADMIN — VANCOMYCIN HYDROCHLORIDE 1000 MG: 1 INJECTION, SOLUTION INTRAVENOUS at 16:04

## 2022-08-30 RX ADMIN — GABAPENTIN 100 MG: 100 CAPSULE ORAL at 21:55

## 2022-08-30 RX ADMIN — ALBUTEROL SULFATE 2.5 MG: 2.5 SOLUTION RESPIRATORY (INHALATION) at 16:07

## 2022-08-30 RX ADMIN — CEFEPIME 2000 MG: 2 INJECTION, POWDER, FOR SOLUTION INTRAVENOUS at 15:18

## 2022-08-30 RX ADMIN — Medication 1.5 MG: at 21:55

## 2022-08-30 RX ADMIN — ISODIUM CHLORIDE 3 ML: 0.03 SOLUTION RESPIRATORY (INHALATION) at 19:06

## 2022-08-30 RX ADMIN — ENOXAPARIN SODIUM 40 MG: 40 INJECTION SUBCUTANEOUS at 15:33

## 2022-08-30 RX ADMIN — ACETAMINOPHEN 650 MG: 325 TABLET ORAL at 21:55

## 2022-08-30 RX ADMIN — LEVALBUTEROL HYDROCHLORIDE 1.25 MG: 1.25 SOLUTION, CONCENTRATE RESPIRATORY (INHALATION) at 19:06

## 2022-08-30 RX ADMIN — DOCUSATE SODIUM 100 MG: 100 CAPSULE, LIQUID FILLED ORAL at 15:33

## 2022-08-30 RX ADMIN — BUDESONIDE 0.5 MG: 0.5 INHALANT ORAL at 19:06

## 2022-08-30 RX ADMIN — ATORVASTATIN CALCIUM 10 MG: 10 TABLET, FILM COATED ORAL at 21:55

## 2022-08-30 RX ADMIN — SODIUM CHLORIDE, SODIUM GLUCONATE, SODIUM ACETATE, POTASSIUM CHLORIDE, MAGNESIUM CHLORIDE, SODIUM PHOSPHATE, DIBASIC, AND POTASSIUM PHOSPHATE 50 ML/HR: .53; .5; .37; .037; .03; .012; .00082 INJECTION, SOLUTION INTRAVENOUS at 16:08

## 2022-08-30 RX ADMIN — GUAIFENESIN 600 MG: 600 TABLET, EXTENDED RELEASE ORAL at 21:55

## 2022-08-30 RX ADMIN — MONTELUKAST 10 MG: 10 TABLET, FILM COATED ORAL at 21:55

## 2022-08-30 RX ADMIN — Medication 12.5 MG: at 19:05

## 2022-08-30 RX ADMIN — ALPRAZOLAM 0.5 MG: 0.5 TABLET ORAL at 21:55

## 2022-08-30 RX ADMIN — DONEPEZIL HYDROCHLORIDE 10 MG: 10 TABLET ORAL at 21:55

## 2022-08-30 NOTE — RESPIRATORY THERAPY NOTE
RT Protocol Note  Dionicio Aleman 80 y o  female MRN: [id number]  Unit/Bed#: ED 03 Encounter: [id number]    Assessment    Principal Problem:    Pneumonia  Active Problems:    Type 2 diabetes mellitus with hemoglobin A1c goal of less than 7 0% (HCC)    CAD (coronary artery disease), native coronary artery    Intermittent asthma    Abdominal aortic aneurysm (AAA) 3 0 cm to 5 0 cm in diameter in female [facility])    Hypothyroidism    Dyslipidemia, goal LDL below 70    Pulmonary fibrosis (HCC)    Dementia due to medical condition without behavioral disturbance (HCC)    COPD, severity to be determined ([address] )    Hypertension      Home Pulmonary Medications:  Albuterol and pulmicort       Past Medical History:   Diagnosis Date    Bell's palsy remote    Coronary artery disease     Diabetes mellitus (HCC)     Disease of thyroid gland     GERD (gastroesophageal reflux disease)     Hip fracture (HCC)     Hyperlipidemia     Hypertension     Psychiatric disorder     Stroke [facility])      Social History     Socioeconomic History    Marital status: Single     Spouse name: None    Number of children: None    Years of education: None    Highest education level: None   Occupational History    None   Tobacco Use    Smoking status: Former Smoker    Smokeless tobacco: Never Used   Vaping Use    Vaping Use: Never used   Substance and Sexual Activity    Alcohol use: No    Drug use: No    Sexual activity: None   Other Topics Concern    None   Social History Narrative    None     Social Determinants of Health     Financial Resource Strain: Not on file   Food Insecurity: No Food Insecurity    Worried About Running Out of Food in the Last Year: Never true    Ran Out of Food in the Last Year: Never true   Transportation Needs: No Transportation Needs    Lack of Transportation (Medical): No    Lack of Transportation (Non-Medical):  No   Physical Activity: Not on file   Stress: Not on file   Social Connections: Not on file   Intimate Partner Violence: Not on file   Housing Stability: Low Risk     Unable to Pay for Housing in the Last Year: No    Number of Places Lived in the Last Year: 1    Unstable Housing in the Last Year: No       Subjective         Objective    Physical Exam:   Assessment Type: Assess only  General Appearance: Awake, Drowsy  Respiratory Pattern: Normal  Chest Assessment: Chest expansion symmetrical  Bilateral Breath Sounds: Diminished    Vitals:  Blood pressure 129/62, pulse 104, temperature 98 3 °F (36 8 °C), temperature source Tympanic, resp  rate 20, weight 42 8 kg (94 lb 5 7 oz), SpO2 97 %  Imaging and other studies: I have personally reviewed pertinent reports  Plan    Respiratory Plan: Home Bronchodilator Patient pathway        Resp Comments: (P) pt assessed per RCP  presents with altered mental status and CXR shows probable pneumonia  pt has hx of COPD  per pts granddaughter, she takes albuterol BID and budesonide PRN  BS diminished  no resp distress noted  will schedule TID xopenex and BID pulmicort

## 2022-08-30 NOTE — CASE MANAGEMENT
Case Management Assessment & Discharge Planning Note    Patient name Danny Dailey  Location ED 03/ED 03 MRN [id number]  : 10/31/1929 Date 2022       Current Admission Date: 2022  Current Admission Diagnosis:Pneumonia   Patient Active Problem List    Diagnosis Date Noted    Pneumonia 2022    Medicare annual wellness visit, subsequent 2022    Foot pain, bilateral 2022    Mild protein-calorie malnutrition ([facility] ) 2021    COPD, severity to be determined ([facility] ) 2021    Hypertension     Pulmonary fibrosis ([place] [address] ) 2018    Left upper lobe pneumonia 2018    Intermittent asthma 2018    Abdominal aortic aneurysm (AAA) 3 0 cm to 5 0 cm in diameter in female [facility]) 2018    Toxic metabolic encephalopathy     Squamous cell carcinoma of right thigh 2014    Hypothyroidism 2012    Type 2 diabetes mellitus with hemoglobin A1c goal of less than 7 0% ([facility] ) 10/19/2011    Dyslipidemia, goal LDL below 70 2011    Mononeuritis of lower limb 2011    CAD (coronary artery disease), native coronary artery 2011    Dementia due to medical condition without behavioral disturbance ([facility] ) 2011    Peripheral vascular disease ([place] [address] ) 2011      LOS (days): 0  Geometric Mean LOS (GMLOS) (days):   Days to GMLOS:     OBJECTIVE:        Current admission status: Inpatient  Referral Reason: Other (ISAR)    Preferred Pharmacy:   [address], [address]  [address] [place] [address]   Phone: [phone] Fax: Pauline Degroot [address] [place] [address] [place] [address]  Phone: [phone] Fax: [facility], [address]  [facility] [address]  Phone: [phone] Fax: [phone]    Primary Care Provider: Robb Garcia MD    Primary Insurance: MEDICARE  Secondary Insurance: [facility] [address]    ASSESSMENT:  [address], [address] [facility],  [address] Representative - Son   Primary Phone: [phone] (Home)                  Readmission Root Cause  30 Day Readmission: No    Patient Information  Admitted from[de-identified] Home  Mental Status: Confused  During Assessment patient was accompanied by: Daughter, Other-Comment (Zain Emery)  Assessment information provided by[de-identified] Daughter, Other - please comment (granddaughter)  Support Systems: Family members, [address] of Residence: [address] do you live in?: [address] access options  Select all that apply : Ramp  Type of Current Residence: 2 story home  Upon entering residence, is there a bedroom on the main floor (no further steps)?: Yes  Upon entering residence, is there a bathroom on the main floor (no further steps)?: Yes  In the last 12 months, was there a time when you were not able to pay the mortgage or rent on time?: No  In the last 12 months, how many places have you lived?: 1  In the last 12 months, was there a time when you did not have a steady place to sleep or slept in a shelter (including now)?: No  Homeless/housing insecurity resource given?: No  Living Arrangements: Lives w/ Extended Family    Activities of Daily Living Prior to Admission  Functional Status: Total dependent  Completes ADLs independently?: No  Level of ADL dependence: Total Dependent  Ambulates independently?: No  Level of ambulatory dependence: Total Dependent  Does patient use assisted devices?: Yes  Assisted Devices (DME) used:  Wheelchair, Bedside Commode  Does patient currently own DME?: Yes  What DME does the patient currently own?: Bedside Commode, Wheelchair  Does patient have a history of Outpatient Therapy (PT/OT)?: Yes  Does the patient have a history of Short-Term Rehab?: Yes [employer] 2020 and [facility] in 2021)  Does patient have a history of HHC?: No  Does patient currently have [facility]?: No     Patient Information Continued  Income Source: Pension/FPC  Does patient have prescription coverage?: Yes  Within the past 12 months, you worried that your food would run out before you got the money to buy more : Never true  Within the past 12 months, the food you bought just didn't last and you didn't have money to get more : Never true  Food insecurity resource given?: No  Does patient receive dialysis treatments?: No  Does patient have a history of substance abuse?: Yes  Historical substance use preference: Alcohol/ETOH  Is patient currently in treatment for substance abuse?: N/A - sober (sober since age 72)  Does patient have a history of Mental Health Diagnosis?: Yes (Depression, Anxiety)  Is patient receiving treatment for mental health?: Yes  Has patient received inpatient treatment related to mental health in the last 2 years?: No    Means of Transportation  Means of Transport to Appts[de-identified] Family transport  In the past 12 months, has lack of transportation kept you from medical appointments or from getting medications?: No  In the past 12 months, has lack of transportation kept you from meetings, work, or from getting things needed for daily living?: No  Was application for public transport provided?: No  DISCHARGE DETAILS:    Discharge planning discussed with[de-identified] daughter Vitaliy Hines and tiajoanne Estradacatalina Mobleyly of Choice: Yes     CM contacted family/caregiver?: Yes      Contacts  Patient Contacts: Adolfo Casas and Kate denson  Relationship to Patient[de-identified] Family  Contact Method: In Person  Reason/Outcome: Continuity of Care, Emergency Contact     Additional Comments: CM met with pt and family at bedside to complete Open  Pt was not able to participate in conversation however family was able to complete Open  Pt resides with her grandaughter, Yarely Paulino who was a nurse at [employer] in the ED and is now a NP with [employer]    Tarunjono Paulino states that pt is able to feed herself most of the time but pt is completely dependent for ADLS  Pt has a wheelchair at home and a BSC that she uses  Pt has a walker but has not been able to use it for some time  Rhoda Machuca requested consideration for home oxygen evaluation prior to d/c  Rhoda Machuca states that pt gets services through her [facility] and has home health aids in the home 13 hrs a day, 7 days a week through [email or web address] [facility]  Rhoda Machuca reports that the agency has had staffing issues and at times staff have not come in

## 2022-08-30 NOTE — ED PROVIDER NOTES
History  Chief Complaint   Patient presents with    Altered Mental Status     Patient resides at home on palliative care treatment  Family concerned that she is more altered than normal       Rae Crawford is a 20-year-old woman with medical history significant for dementia, and pulmonary fibrosis, hypertension, presenting with abnormal mental status, decreased appetite, productive cough  She developed decreased appetite productive cough about a week ago  Today, family noted that she was more fatigued and usual, unable to take her home medications  They noted productive cough, which she is unable to fully cough up any phlegm  Granddaughter checked her pulse oximetry, found to be 87% which is low for her, she typically is between 88-92%  She also checked a urinalysis, found to be positive for nitrates and negative for leukocytes  She recently had a pneumonia in June of 2022 and was hospitalized at that time  She presents with her granddaughter and daughter, who are her primary caregivers, who provide the entirety of this history  No nausea, vomiting, fevers, chills, diarrhea, blood in stool, melena as reported by daughter and granddaughter, patient's caregivers  Prior to Admission Medications   Prescriptions Last Dose Informant Patient Reported? Taking?    ALPRAZolam (XANAX) 0 5 mg tablet 8/29/2022 at Unknown time  No Yes   Sig: Take 1 tablet (0 5 mg total) by mouth daily at bedtime as needed for anxiety   DULoxetine (CYMBALTA) 20 mg capsule 8/30/2022 at Unknown time  No Yes   Sig: Take 1 capsule (20 mg total) by mouth daily   Docusate Sodium 100 MG capsule 8/30/2022 at Unknown time  Yes Yes   Sig: Take by mouth   Gauze Pads & Dressings (AMD Foam Dressing) 4"X4" PADS Not Taking at Unknown time  No No   Sig: Use 2 (two) times a day   Patient not taking: No sig reported   Incontinence Supplies MISC 8/30/2022 at Unknown time  No Yes   Sig: Pt uses up to 3 packs of 100 wipes per week   Incontinence Supply Disposable (Incontinence Brief Medium) MISC 8/30/2022 at Unknown time  No Yes   Sig: Pt uses 5 pullups daily as needed   Incontinence Supply Disposable (Poise Maximum Absorbency) PADS 8/30/2022 at Unknown time  No Yes   Sig: Use 2 (two) times a day Size 6 long   Incontinence Supply Disposable MISC 8/30/2022 at Unknown time  No Yes   Sig: Pt uses 3 at night as needed   acetaminophen (TYLENOL) 650 mg CR tablet 8/30/2022 at Unknown time  Yes Yes   Sig: Take 650 mg by mouth every 8 (eight) hours as needed for mild pain   albuterol (2 5 mg/3 mL) 0 083 % nebulizer solution 8/30/2022 at Unknown time  Yes Yes   Sig: TAKE 3ML BY NEBULIZER EVERY 6 HOURS AS  NEEDED FOR WHEEZING OR SHORTNESS OR BREATH   aspirin (ECOTRIN LOW STRENGTH) 81 mg EC tablet 8/30/2022 at Unknown time  No Yes   Sig: Take 1 tablet (81 mg total) by mouth daily   atorvastatin (LIPITOR) 10 mg tablet 8/29/2022 at Unknown time  No Yes   Sig: Take 1 tablet (10 mg total) by mouth daily at bedtime   dimethicone 1 % cream Not Taking at Unknown time  No No   Sig: Apply topically 2 (two) times a day as needed for dry skin   Patient not taking: Reported on 8/30/2022   donepezil (ARICEPT) 10 mg tablet 8/30/2022 at Unknown time  No Yes   Sig: Take 1 tablet (10 mg total) by mouth daily at bedtime   gabapentin (NEURONTIN) 100 mg capsule 8/30/2022 at Unknown time  No Yes   Sig: TAKE ONE CAPSULE BY MOUTH THREE TIMES A DAY   guaiFENesin (MUCINEX) 600 mg 12 hr tablet Not Taking at Unknown time  No No   Sig: Take 1 tablet (600 mg total) by mouth every 12 (twelve) hours   Patient not taking: Reported on 8/30/2022   levothyroxine 100 mcg tablet 8/30/2022 at Unknown time  No Yes   Sig: Take 1 tablet (100 mcg total) by mouth daily   melatonin 1 mg 8/29/2022 at Unknown time  Yes Yes   Sig: Take 1 mg by mouth daily at bedtime   metoprolol tartrate (LOPRESSOR) 25 mg tablet 8/30/2022 at Unknown time  No Yes   Sig: Take 0 5 tablets (12 5 mg total) by mouth 2 (two) times a day montelukast (SINGULAIR) 10 mg tablet 8/29/2022 at Unknown time  No Yes   Sig: Take 1 tablet (10 mg total) by mouth daily at bedtime   nitroglycerin (NITROSTAT) 0 4 mg SL tablet Not Taking at Unknown time  Yes No   Sig: Place 0 4 mg under the tongue every 5 (five) minutes as needed for chest pain   Patient not taking: Reported on 8/30/2022   omeprazole (PriLOSEC) 20 mg delayed release capsule 8/30/2022 at Unknown time  No Yes   Sig: Take 1 capsule (20 mg total) by mouth daily      Facility-Administered Medications: None       Past Medical History:   Diagnosis Date    Bell's palsy remote    Coronary artery disease     Diabetes mellitus ([facility] [address] )     Disease of thyroid gland     GERD (gastroesophageal reflux disease)     Hip fracture (HCC)     Hyperlipidemia     Hypertension     Psychiatric disorder     Stroke [facility])        History reviewed  No pertinent surgical history  Family History   Problem Relation Age of Onset    Diabetes Mother     Cancer Father     Cancer Sister     Cancer Brother      I have reviewed and agree with the history as documented  E-Cigarette/Vaping    E-Cigarette Use Never User      E-Cigarette/Vaping Substances     Social History     Tobacco Use    Smoking status: Former Smoker    Smokeless tobacco: Never Used   Vaping Use    Vaping Use: Never used   Substance Use Topics    Alcohol use: No    Drug use: No        Review of Systems   All other systems reviewed and are negative  Physical Exam  ED Triage Vitals [08/30/22 1113]   Temperature Pulse Respirations Blood Pressure SpO2   98 3 °F (36 8 °C) 105 18 142/76 (!) 89 %      Temp Source Heart Rate Source Patient Position - Orthostatic VS BP Location FiO2 (%)   Tympanic Monitor Lying Left arm --      Pain Score       No Pain             Orthostatic Vital Signs  Vitals:    08/30/22 1113   BP: 142/76   Pulse: 105   Patient Position - Orthostatic VS: Lying       Physical Exam  Vitals and nursing note reviewed  Constitutional:       General: She is not in acute distress  Appearance: She is ill-appearing  HENT:      Head: Normocephalic and atraumatic  Right Ear: External ear normal       Left Ear: External ear normal       Nose: Nose normal       Mouth/Throat:      Mouth: Mucous membranes are moist    Eyes:      Extraocular Movements: Extraocular movements intact  Pupils: Pupils are equal, round, and reactive to light  Cardiovascular:      Rate and Rhythm: Regular rhythm  Tachycardia present  Pulses: Normal pulses  Pulmonary:      Effort: Pulmonary effort is normal  No tachypnea or respiratory distress  Breath sounds: Examination of the right-upper field reveals rhonchi  Examination of the right-middle field reveals rhonchi  Examination of the left-middle field reveals rhonchi  Examination of the right-lower field reveals rhonchi  Examination of the left-lower field reveals rhonchi  Rhonchi present  No rales  Abdominal:      General: There is no distension  Palpations: Abdomen is soft  Tenderness: There is no abdominal tenderness  Musculoskeletal:         General: No deformity  Skin:     General: Skin is warm and dry  Neurological:      Mental Status: She is alert  Comments: More confused than normal per patient's daughter who is at bedside  Opens eyes to voice, alert, follows commands            ED Medications  Medications   cefepime (MAXIPIME) 2 g/50 mL dextrose IVPB (2,000 mg Intravenous New Bag 8/30/22 1518)   vancomycin (VANCOCIN) IVPB (premix in dextrose) 1,000 mg 200 mL (has no administration in time range)   enoxaparin (LOVENOX) subcutaneous injection 40 mg (40 mg Subcutaneous Given 8/30/22 1533)   acetaminophen (TYLENOL) tablet 650 mg (has no administration in time range)   albuterol inhalation solution 2 5 mg (has no administration in time range)   ALPRAZolam (XANAX) tablet 0 5 mg (has no administration in time range)   aspirin (ECOTRIN LOW STRENGTH) EC tablet 81 mg (has no administration in time range)   atorvastatin (LIPITOR) tablet 10 mg (has no administration in time range)   basis sensitive skin bar (has no administration in time range)   docusate sodium (COLACE) capsule 100 mg (100 mg Oral Given 8/30/22 1533)   donepezil (ARICEPT) tablet 10 mg (has no administration in time range)   DULoxetine (CYMBALTA) delayed release capsule 20 mg (has no administration in time range)   gabapentin (NEURONTIN) capsule 100 mg (has no administration in time range)   guaiFENesin (MUCINEX) 12 hr tablet 600 mg (has no administration in time range)   levothyroxine tablet 100 mcg (has no administration in time range)   metoprolol tartrate (LOPRESSOR) partial tablet 12 5 mg (has no administration in time range)   montelukast (SINGULAIR) tablet 10 mg (has no administration in time range)   pantoprazole (PROTONIX) EC tablet 40 mg (has no administration in time range)   melatonin tablet 1 5 mg (has no administration in time range)   multi-electrolyte (PLASMALYTE-A/ISOLYTE-S PH 7 4) IV solution (has no administration in time range)   ceftriaxone (ROCEPHIN) 1 g/50 mL in dextrose IVPB (has no administration in time range)       Diagnostic Studies  Results Reviewed     Procedure Component Value Units Date/Time    UA w Reflex to Microscopic w Reflex to Culture [[id number]] Collected: 08/30/22 1529    Lab Status:  In process Specimen: Urine, Straight Cath Updated: 08/30/22 1537    MRSA culture [[id number]] Collected: 08/30/22 1530    Lab Status: No result Specimen: Nose     Procalcitonin [[id number]]  (Abnormal) Collected: 08/30/22 1226    Lab Status: Final result Specimen: Blood from Arm, Left Updated: 08/30/22 1422     Procalcitonin 2 58 ng/ml     CBC and differential [[id number]]  (Abnormal) Collected: 08/30/22 1226    Lab Status: Final result Specimen: Blood from Arm, Left Updated: 08/30/22 1412     WBC 34 33 Thousand/uL      RBC 4 31 Million/uL      Hemoglobin 11 6 g/dL      Hematocrit 38 6 % MCV 90 fL      MCH 26 9 pg      MCHC 30 1 g/dL      RDW 14 5 %      MPV 9 3 fL      Platelets 938 Thousands/uL     Narrative: This is an appended report  These results have been appended to a previously verified report  Manual Differential(PHLEBS Do Not Order) [[id number]]  (Abnormal) Collected: 08/30/22 1226    Lab Status: Final result Specimen: Blood from Arm, Left Updated: 08/30/22 1412     Segmented % 91 %      Bands % 1 %      Lymphocytes % 3 %      Monocytes % 3 %      Eosinophils, % 0 %      Basophils % 0 %      Atypical Lymphocytes % 2 %      Absolute Neutrophils 31 58 Thousand/uL      Lymphocytes Absolute 1 03 Thousand/uL      Monocytes Absolute 1 03 Thousand/uL      Eosinophils Absolute 0 00 Thousand/uL      Basophils Absolute 0 00 Thousand/uL      Total Counted --     RBC Morphology Normal     Platelet Estimate Adequate    COVID only [[id number]]  (Normal) Collected: 08/30/22 1152    Lab Status: Final result Specimen: Nares from Nose Updated: 08/30/22 1328     SARS-CoV-2 Negative    Narrative:      FOR PEDIATRIC PATIENTS - copy/paste COVID Guidelines URL to browser: [email or web address]    SARS-CoV-2 assay is a Nucleic Acid Amplification assay intended for the  qualitative detection of nucleic acid from SARS-CoV-2 in nasopharyngeal  swabs  Results are for the presumptive identification of SARS-CoV-2 RNA  Positive results are indicative of infection with SARS-CoV-2, the virus  causing COVID-19, but do not rule out bacterial infection or co-infection  with other viruses  Laboratories within the [place] and its  territories are required to report all positive results to the appropriate  public health authorities  Negative results do not preclude SARS-CoV-2  infection and should not be used as the sole basis for treatment or other  patient management decisions   Negative results must be combined with  clinical observations, patient history, and epidemiological information  This test has not been FDA cleared or approved  This test has been authorized by FDA under an Emergency Use Authorization  (EUA)  This test is only authorized for the duration of time the  declaration that circumstances exist justifying the authorization of the  emergency use of an in vitro diagnostic tests for detection of SARS-CoV-2  virus and/or diagnosis of COVID-19 infection under section 564(b)(1) of  the Act, 21 U  S C  432TBJ-0(F)(0), unless the authorization is terminated  or revoked sooner  The test has been validated but independent review by FDA  and CLIA is pending  Test performed using [employer]: This RT-PCR assay targets N2,  a region unique to SARS-CoV-2  A conserved region in the E-gene was chosen  for pan-Sarbecovirus detection which includes SARS-CoV-2  Lactic acid [[id number]]  (Normal) Collected: 08/30/22 1226    Lab Status: Final result Specimen: Blood from Arm, Left Updated: 08/30/22 1320     LACTIC ACID 1 2 mmol/L     Narrative:      Result may be elevated if tourniquet was used during collection      Comprehensive metabolic panel [[id number]]  (Abnormal) Collected: 08/30/22 1226    Lab Status: Final result Specimen: Blood from Arm, Left Updated: 08/30/22 1309     Sodium 138 mmol/L      Potassium 4 3 mmol/L      Chloride 105 mmol/L      CO2 30 mmol/L      ANION GAP 3 mmol/L      BUN 20 mg/dL      Creatinine 0 54 mg/dL      Glucose 125 mg/dL      Calcium 8 6 mg/dL      Corrected Calcium 9 9 mg/dL      AST 19 U/L      ALT 14 U/L      Alkaline Phosphatase 153 U/L      Total Protein 6 7 g/dL      Albumin 2 4 g/dL      Total Bilirubin 0 37 mg/dL      eGFR 82 ml/min/1 73sq m     Narrative:      [place] guidelines for Chronic Kidney Disease (CKD):     Stage 1 with normal or high GFR (GFR > 90 mL/min/1 73 square meters)    Stage 2 Mild CKD (GFR = 60-89 mL/min/1 73 square meters)    Stage 3A Moderate CKD (GFR = 45-59 mL/min/1 73 square meters)    Stage 3B Moderate CKD (GFR = 30-44 mL/min/1 73 square meters)    Stage 4 Severe CKD (GFR = 15-29 mL/min/1 73 square meters)    Stage 5 End Stage CKD (GFR <15 mL/min/1 73 square meters)  Note: GFR calculation is accurate only with a steady state creatinine    Protime-INR [[id number]]  (Normal) Collected: 08/30/22 1226    Lab Status: Final result Specimen: Blood from Arm, Left Updated: 08/30/22 1303     Protime 13 9 seconds      INR 1 04    APTT [[id number]]  (Normal) Collected: 08/30/22 1226    Lab Status: Final result Specimen: Blood from Arm, Left Updated: 08/30/22 1303     PTT 31 seconds     Blood gas, Venous [[id number]]  (Abnormal) Collected: 08/30/22 1226    Lab Status: Final result Specimen: Blood from Arm, Left Updated: 08/30/22 1234     pH, Jett 7 402     pCO2, Jett 46 8 mm Hg      pO2, Jett 38 5 mm Hg      HCO3, Jett 28 5 mmol/L      Base Excess, Jett 3 1 mmol/L      O2 Content, Jett 12 4 ml/dL      O2 HGB, VENOUS 70 1 %     Blood culture #1 [[id number]] Collected: 08/30/22 1226    Lab Status: In process Specimen: Blood from Arm, Left Updated: 08/30/22 1230    Blood culture #2 [[id number]] Updated: 08/30/22 1216    Lab Status: In process Specimen: Blood                  XR chest portable - 1 view   Final Result by Jones Zarate MD (08/30 1308)      Left greater than right bibasilar opacity, question atelectasis and/or pneumonia, with underlying fibrosis  Workstation performed: [id number]               Procedures  Procedures      ED Course  ED Course as of 08/30/22 1541   Tue Aug 30, 2022   1245 WBC(!!): 34 33   1309 Cefepime and vanc ordered, nursing to obtain urine sample prior to administration      1311 pCO2, Jett: 46 8   1315 XR chest portable - 1 view  Possibly LLL pneumonia   1328 LACTIC ACID: 1 2   1358 SARS-COV-2: Negative               Identification of Seniors at [address] [place] [address] Most Recent Value   (ISAR) Identification of Seniors at Risk    Before the illness or injury that brought you to the Emergency, did you need someone to help you on a regular basis? 1 Filed at: 08/30/2022 1116   In the last 24 hours, have you needed more help than usual? 1 Filed at: 08/30/2022 1116   Have you been hospitalized for one or more nights during the past 6 months? --   In general, do you see well? 1 Filed at: 08/30/2022 1116   In general, do you have serious problems with your memory? 1 Filed at: 08/30/2022 1116   Do you take more than three different medications every day? 1 Filed at: 08/30/2022 1116   ISAR Score 5 Filed at: 08/30/2022 1116                              MDM  Number of Diagnoses or Management Options  Hypoxia  Pneumonia  Pulmonary fibrosis ([facility] [address] )  Diagnosis management comments: 79 yo woman presenting with increased confusion  Given dementia, pulmonary fibrosis, cough, concerning for pneumonia  Also concerning for potentially UTI  Possibly hyponatremia, uremia, anemia  Will evaluate with sepsis workup including chest x-ray, COVID, and UA  CXR shows possible LLL pneumonia  Significant leukocytosis  Nursing to obtain urine via straight cath ideally, then start cefepime and vancomycin  Lactic <2  Admitted to medicine          Amount and/or Complexity of Data Reviewed  Decide to obtain previous medical records or to obtain history from someone other than the patient: yes        Disposition  Final diagnoses:   Hypoxia   Pneumonia   Pulmonary fibrosis ([facility] [address] )     Time reflects when diagnosis was documented in both MDM as applicable and the Disposition within this note     Time User Action Codes Description Comment    8/30/2022 12:06 PM Brandyn Ugalde Add [R09 02] Hypoxia     8/30/2022  2:13 PM [facility] Floor Add [J18 9] Pneumonia     8/30/2022  2:13 PM [facility] Floor Add [J84 10] Pulmonary fibrosis [facility])       ED Disposition     ED Disposition   Admit    Condition   Stable    Date/Time   Tue Aug 30, 2022  2:13 PM    Comment   --         Follow-up Information    None Patient's Medications   Discharge Prescriptions    No medications on file     No discharge procedures on file  PDMP Review     None           ED Provider  Attending physically available and evaluated Cuca Renee I managed the patient along with the ED Attending      Electronically Signed by         Raquel Navarrete MD  08/30/22 3163

## 2022-08-30 NOTE — H&P
INTERNAL MEDICINE RESIDENCY ADMISSION H&P     Name: Samuel Tucker   Age & Sex: 80 y o  female   MRN: [id number]  Unit/Bed#: ED 03   Encounter: [id number]  Primary Care Provider: Kirby Darnell MD    Code Status: Level 3 - DNAR and DNI  Admission Status: INPATIENT   Disposition: Patient requires Med/Surg    Admit to team: SOD Team B     ASSESSMENT/PLAN     Principal Problem:    Pneumonia  Active Problems:    CAD (coronary artery disease), native coronary artery    Intermittent asthma    COPD, severity to be determined ([facility] )    Hypertension    Dementia due to medical condition without behavioral disturbance ([place] [address] )    Hypothyroidism    Dyslipidemia, goal LDL below 70    Abdominal aortic aneurysm (AAA) 3 0 cm to 5 0 cm in diameter in female [facility])    Pulmonary fibrosis ([place] [address] )    Type 2 diabetes mellitus with hemoglobin A1c goal of less than 7 0% ([place] [address] )      No new Assessment & Plan notes have been filed under this hospital service since the last note was generated  Service: Internal Medicine      VTE Pharmacologic Prophylaxis: Enoxaparin (Lovenox)  VTE Mechanical Prophylaxis: sequential compression device    CHIEF COMPLAINT     Chief Complaint   Patient presents with    Altered Mental Status     Patient resides at home on palliative care treatment  Family concerned that she is more altered than normal        HISTORY OF PRESENT ILLNESS     Patient is a 80-year-old female past medical history of dementia, COPD, diabetes type 2 diet controlled, GERD, hypertension, hyperlipidemia  She presented B ED 08/30/2022 after her granddaughter and caregiver both noticed a change in her mental status  Patient is bedbound and is only able to perform self feeding and hair brushing  Relies on her granddaughter who is an NP, and healthcare aide to provider ADLs  Yesterday afternoon the granddaughter noticed the patient was will be more lethargic than usual   Temperature was taken at that time, 99 9 F   Overnight the patient was talking in her sleep more than usual   And then this morning the patient was not following commands like she usually does  This concerned the granddaughter and the patient was brought to the ED  Of note patient recently treated on 06/22 azithromycin for acute bronchitis  Denies any current RANDY  Lives with a granddaughter and has a caregiver  She is bed-bound at baseline  Can feed herself  Vitals in the ED:  98 3, , R 18, /76, SpO2 89% on RA, improved with 2 L O2 N/C  Labs notable for WBC count 34 3, procal 2 58  Chest x-ray with left greater than right bibasilar opacity  No effusion  No pneumothorax  Suggestive of pneumonia versus atelectasis  Underlying fibrosis  In the ED blood cultures were obtained  UA and culture were all ordered  Patient was started on cefepime and vanc  Per conversation with the daughter Fany Nassar, patient is DNR DNI level 3  She will be admitted under SOD B for continued care of pneumonia  Per conversations with the daughter, there is no legal POA  Decisions for the patient are made by the daughter Shahid Marrero and the patient's son  Fany Nassar can be called with questions she will share with her brother to make joint decisions for the patient  REVIEW OF SYSTEMS     Review of Systems   Constitutional: Negative for chills and fever  HENT: Negative for ear pain and sore throat  Eyes: Negative for pain and visual disturbance  Respiratory: Negative for cough and shortness of breath  Cardiovascular: Negative for chest pain and palpitations  Gastrointestinal: Positive for abdominal pain ( mild epigastric pain)  Negative for vomiting  Genitourinary: Negative for dysuria and hematuria  Musculoskeletal: Negative for arthralgias and back pain  Skin: Negative for color change and rash  Neurological: Negative for seizures and syncope  All other systems reviewed and are negative      OBJECTIVE     Vitals:    08/30/22 1113   BP: 142/76   BP Location: Left arm   Pulse: 105   Resp: 18   Temp: 98 3 °F (36 8 °C)   TempSrc: Tympanic   SpO2: (!) 89%   Weight: 42 8 kg (94 lb 5 7 oz)      Temperature:   Temp (24hrs), Av 3 °F (36 8 °C), Min:98 3 °F (36 8 °C), Max:98 3 °F (36 8 °C)    Temperature: 98 3 °F (36 8 °C)  Intake & Output:  I/O     None        Weights:        Body mass index is 18 43 kg/m²  Weight (last 2 days)     Date/Time Weight    22 1113 42 8 (94 36)        Physical Exam  Constitutional:       General: She is not in acute distress  Appearance: She is not toxic-appearing  Comments: Cachectic   HENT:      Head:      Comments: No dentition     Right Ear: External ear normal       Left Ear: External ear normal       Mouth/Throat:      Mouth: Mucous membranes are dry  Eyes:      Extraocular Movements: Extraocular movements intact  Pupils: Pupils are equal, round, and reactive to light  Cardiovascular:      Rate and Rhythm: Normal rate and regular rhythm  Pulses: Normal pulses  Heart sounds: Normal heart sounds  No murmur heard  No friction rub  No gallop  Comments: No lower extremity pulses, but limbs warm and appear well perfused  Pulmonary:      Effort: Pulmonary effort is normal  No respiratory distress  Breath sounds: Wheezing present  No rhonchi or rales  Comments: Most prominent in the right lower lobe  Abdominal:      General: Abdomen is flat  There is no distension  Palpations: Abdomen is soft  There is mass (Periumbilical)  Tenderness: There is no abdominal tenderness  There is no guarding  Musculoskeletal:         General: No swelling or deformity  Right lower leg: No edema  Left lower leg: No edema  Skin:     Coloration: Skin is not jaundiced  Findings: No bruising, lesion or rash  Neurological:      General: No focal deficit present        Comments: Pleasantly demented       PAST MEDICAL HISTORY     Past Medical History:   Diagnosis Date    Bell's palsy remote    Coronary artery disease     Diabetes mellitus ([address] )     Disease of thyroid gland     GERD (gastroesophageal reflux disease)     Hip fracture (HCC)     Hyperlipidemia     Hypertension     Psychiatric disorder     Stroke [facility])      PAST SURGICAL HISTORY   History reviewed  No pertinent surgical history  SOCIAL & FAMILY HISTORY     Social History     Substance and Sexual Activity   Alcohol Use No     Social History     Substance and Sexual Activity   Drug Use No     Social History     Tobacco Use   Smoking Status Former Smoker   Smokeless Tobacco Never Used     Family History   Problem Relation Age of Onset    Diabetes Mother     Cancer Father     Cancer Sister     Cancer Brother      LABORATORY DATA     Labs: I have personally reviewed pertinent reports  Results from last 7 days   Lab Units 08/30/22  1226   WBC Thousand/uL 34 33*   HEMOGLOBIN g/dL 11 6   HEMATOCRIT % 38 6   PLATELETS Thousands/uL 335   MONO PCT % 3*      Results from last 7 days   Lab Units 08/30/22  1226   POTASSIUM mmol/L 4 3   CHLORIDE mmol/L 105   CO2 mmol/L 30   BUN mg/dL 20   CREATININE mg/dL 0 54*   CALCIUM mg/dL 8 6   ALK PHOS U/L 153*   ALT U/L 14   AST U/L 19              Results from last 7 days   Lab Units 08/30/22  1226   INR  1 04   PTT seconds 31     Results from last 7 days   Lab Units 08/30/22  1226   LACTIC ACID mmol/L 1 2         Micro:  Lab Results   Component Value Date    BLOODCX No Growth After 5 Days  11/01/2018    BLOODCX No Growth After 5 Days  11/01/2018    URINECX >100,000 cfu/ml Escherichia coli (A) 11/12/2021    URINECX 40,000-49,000 cfu/ml  11/12/2021    URINECX >100,000 cfu/ml Escherichia coli (A) 10/15/2021     IMAGING & DIAGNOSTIC TESTS     Imaging: I have personally reviewed pertinent reports  XR chest portable - 1 view    Result Date: 8/30/2022  Impression: Left greater than right bibasilar opacity, question atelectasis and/or pneumonia, with underlying fibrosis  Workstation performed: [id number]     EKG, Pathology, and Other Studies: I have personally reviewed pertinent reports  ALLERGIES   No Known Allergies  MEDICATIONS PRIOR TO ARRIVAL     Prior to Admission medications    Medication Sig Start Date End Date Taking?  Authorizing Provider   acetaminophen (TYLENOL) 650 mg CR tablet Take 650 mg by mouth every 8 (eight) hours as needed for mild pain    Historical Provider, MD   albuterol (2 5 mg/3 mL) 0 083 % nebulizer solution TAKE 3ML BY NEBULIZER EVERY 6 HOURS AS  NEEDED FOR WHEEZING OR SHORTNESS OR BREATH 3/29/22   Historical Provider, MD   ALPRAZolam Angle Whitney) 0 5 mg tablet Take 1 tablet (0 5 mg total) by mouth daily at bedtime as needed for anxiety 8/1/22   Ender Nguyen MD   aspirin (ECOTRIN LOW STRENGTH) 81 mg EC tablet Take 1 tablet (81 mg total) by mouth daily 8/1/22   Ender Nguyen MD   atorvastatin (LIPITOR) 10 mg tablet Take 1 tablet (10 mg total) by mouth daily at bedtime 8/1/22   Ender Nguyen MD   dimethicone 1 % cream Apply topically 2 (two) times a day as needed for dry skin 2/1/22   Ender Nguyen MD   Docusate Sodium 100 MG capsule Take by mouth    Historical Provider, MD   donepezil (ARICEPT) 10 mg tablet Take 1 tablet (10 mg total) by mouth daily at bedtime 8/1/22   Ender Nguyen MD   DULoxetine (CYMBALTA) 20 mg capsule Take 1 capsule (20 mg total) by mouth daily 8/1/22   Ender Nguyen MD   gabapentin (NEURONTIN) 100 mg capsule TAKE ONE CAPSULE BY MOUTH THREE TIMES A DAY 8/26/22   Ender Nguyen MD   Gauze Pads & Dressings (AMD Foam Dressing) 4"X4" PADS Use 2 (two) times a day  Patient not taking: Reported on 4/19/2022 2/4/22   Ender Nguyen MD   guaiFENesin (MUCINEX) 600 mg 12 hr tablet Take 1 tablet (600 mg total) by mouth every 12 (twelve) hours 6/2/22   Ender Nguyen MD   Incontinence Supplies MISC Pt uses up to 3 packs of 100 wipes per week 2/4/22   Ender Nguyen MD   Incontinence Supply Disposable (Incontinence Brief Medium) MISC Pt uses 5 pullups daily as needed 2/4/22   Zahida Moon MD   Incontinence Supply Disposable (Poise Maximum Absorbency) PADS Use 2 (two) times a day Size 6 long 5/13/22   Zahida Moon MD   Incontinence Supply Disposable MISC Pt uses 3 at night as needed 2/4/22   Zahida Moon MD   levothyroxine 100 mcg tablet Take 1 tablet (100 mcg total) by mouth daily 8/1/22   Zahida Moon MD   melatonin 1 mg Take 1 mg by mouth daily at bedtime 8/8/21   Historical Provider, MD   metoprolol tartrate (LOPRESSOR) 25 mg tablet Take 0 5 tablets (12 5 mg total) by mouth 2 (two) times a day 8/1/22   Zahida Moon MD   montelukast (SINGULAIR) 10 mg tablet Take 1 tablet (10 mg total) by mouth daily at bedtime 8/1/22   Zahida Moon MD   nitroglycerin (NITROSTAT) 0 4 mg SL tablet Place 0 4 mg under the tongue every 5 (five) minutes as needed for chest pain    Historical Provider, MD   omeprazole (PriLOSEC) 20 mg delayed release capsule Take 1 capsule (20 mg total) by mouth daily 8/1/22   Zahida Moon MD     MEDICATIONS ADMINISTERED IN LAST 24 HOURS     CURRENT MEDICATIONS     Current Facility-Administered Medications   Medication Dose Route Frequency Provider Last Rate    cefepime  2,000 mg Intravenous Once Stacia Brown MD      vancomycin  1,000 mg Intravenous Once Stacia Brown MD               Admission Time  I spent 20 minutes admitting the patient  This involved direct patient contact where I performed a full history and physical, reviewing previous records, and reviewing laboratory and other diagnostic studies  Portions of the record may have been created with voice recognition software  Occasional wrong word or "sound a like" substitutions may have occurred due to the inherent limitations of voice recognition software    Read the chart carefully and recognize, using context, where substitutions have occurred     ==  Niko Iqbal MD  [facility]  Internal Medicine Residency PGY-2

## 2022-08-30 NOTE — ASSESSMENT & PLAN NOTE
Drip Score 2 (antibiotic use, COPD), Severe CAP score 2 (confusion, BUN)  Patient was started on cefepime and vancomycin in the ED  COVID negative, likely non severe CAP bacterial pneumonia    Plan:  · Will step-down patient to monotherapy with ceftriaxone per algorithm  · Will discontinue ceftriaxone and begin cefuroxime for 1 day starting 09/03 to complete treatment  · MRSA negative  · UA also drawn in ED, however believe this is more pneumonia than UTI  · Trend CBC, fever curve  · O2 N/C to maintain O2 sats above 88%  Patient requiring 2LNC  Will get home O2 eval for home oxygen     · Gentle hydration

## 2022-08-30 NOTE — ASSESSMENT & PLAN NOTE
Continue home regimen of alprazolam, donepezil, duloxetine, melatonin, gabapentin  Per family, patient is occasionally tried to get out of bed at night  Plan:  · Family has had to use soft restraints occasionally at home  · Will defer soft restraints at this time     · If attempts to redirect and virtual continuous observation fail, can consider soft restraints

## 2022-08-30 NOTE — ED ATTENDING ATTESTATION
8/30/2022  ITony DO, saw and evaluated the patient  I have discussed the patient with the resident/non-physician practitioner and agree with the resident's/non-physician practitioner's findings, Plan of Care, and MDM as documented in the resident's/non-physician practitioner's note, except where noted  All available labs and Radiology studies were reviewed  I was present for key portions of any procedure(s) performed by the resident/non-physician practitioner and I was immediately available to provide assistance  At this point I agree with the current assessment done in the Emergency Department  I have conducted an independent evaluation of this patient a history and physical is as follows:    History provided by:  Patient  Altered Mental Status  Presenting symptoms: lethargy    Presenting symptoms: no confusion    Severity:  Mild  Most recent episode: Today  Episode history:  Continuous  Timing:  Constant  Progression:  Worsening  Chronicity:  New  Associated symptoms: no abdominal pain, no fever, no headaches, no nausea, no rash and no weakness     are as follows /76 (BP Location: Left arm)   Pulse 105   Temp 98 3 °F (36 8 °C) (Tympanic)   Resp 18   Wt 42 8 kg (94 lb 5 7 oz)   SpO2 (!) 89%   BMI 18 43 kg/m²   Review of Systems Review of Systems   Constitutional: Negative for chills and fever  HENT: Negative for rhinorrhea, sore throat and trouble swallowing  Eyes: Negative for pain  Respiratory: Negative for cough, shortness of breath, wheezing and stridor  Cardiovascular: Negative for chest pain and leg swelling  Gastrointestinal: Negative for abdominal pain, diarrhea and nausea  Endocrine: Negative for polyuria  Genitourinary: Negative for dysuria, flank pain and urgency  Musculoskeletal: Negative for joint swelling, myalgias and neck stiffness  Skin: Negative for rash  Allergic/Immunologic: Negative for immunocompromised state     Neurological: Negative for dizziness, syncope, weakness, numbness and headaches  Psychiatric/Behavioral: Negative for confusion and suicidal ideas  All other systems reviewed and are negative  Physical Exam remarkable for Physical Exam  Vitals and nursing note reviewed  Constitutional:       Appearance: Normal appearance  She is well-developed  She is cachectic  HENT:      Head: Normocephalic and atraumatic  Nose: Nose normal       Mouth/Throat:      Mouth: Mucous membranes are moist    Eyes:      Extraocular Movements: Extraocular movements intact  Pupils: Pupils are equal, round, and reactive to light  Cardiovascular:      Rate and Rhythm: Normal rate and regular rhythm  Heart sounds: No murmur heard  No friction rub  Pulmonary:      Effort: No respiratory distress  Breath sounds: Normal breath sounds  No wheezing or rales  Abdominal:      General: Bowel sounds are normal  There is no distension  Palpations: Abdomen is soft  Tenderness: There is no abdominal tenderness  Musculoskeletal:         General: No tenderness  Normal range of motion  Cervical back: Normal range of motion and neck supple  Skin:     General: Skin is warm  Coloration: Skin is pale  Findings: No rash  Neurological:      Mental Status: She is alert  Mental status is at baseline  She is confused  Psychiatric:         Mood and Affect: Mood normal       Work up and treatment plan discussed with Treatment Team: Attending Provider: Aezem Birmingham DO; Registered Nurse: Romaine Larson RN; Resident: Wolfgang Tipton MD; ED Technician: Nic Mendoza and agreed upon plan  MDM  Number of Diagnoses or Management Options  Hypoxia  Diagnosis management comments: Is a 80-year-old female who presents emergency department with noted symptoms of altered mental status increased confusion as well as fatigue over the last several hours    Woke up this morning with similar symptoms currently living with family members  In the emergency department the patient is awake and alert had initial hypoxia with 89% was placed on oxygen  Plan will be for labs, xrays and admission  Amount and/or Complexity of Data Reviewed  Clinical lab tests: ordered and reviewed  Tests in the radiology section of CPT®: reviewed and ordered  Review and summarize past medical records: yes  Independent visualization of images, tracings, or specimens: yes           Clinical Impression:    Final diagnoses:   Hypoxia         Disposition    admitted to the hospital           New Prescriptions:    New Prescriptions    No medications on file            Follow-up Instructions:    No follow-up provider specified  ED Course         Critical Care Time  CriticalCare Time  Performed by: Monserrat Thomas DO  Authorized by: Monserrat Thomas DO     Critical care provider statement:     Critical care time (minutes):  50    Critical care time was exclusive of:  Separately billable procedures and treating other patients and teaching time    Critical care was necessary to treat or prevent imminent or life-threatening deterioration of the following conditions:  Respiratory failure (hypoxia )    Critical care was time spent personally by me on the following activities:  Blood draw for specimens, obtaining history from patient or surrogate, development of treatment plan with patient or surrogate, evaluation of patient's response to treatment, examination of patient, ordering and performing treatments and interventions, ordering and review of laboratory studies, ordering and review of radiographic studies, re-evaluation of patient's condition and review of old charts  Comments:      Upon my evaluation, this patient has a high probability of imminent or life-threatening deterioration due to hypoxia requiring O2 supplementation    which required my direct attention, intervention, and personal management      I have personally provided 50 minutes of critical care time, exclusive of procedures, teaching, and any prior time recorded by providers other than myself  Time includes review of laboratory data, radiology results, discussion with consultants, and monitoring for potential decompensation

## 2022-08-30 NOTE — ASSESSMENT & PLAN NOTE
Lab Results   Component Value Date    HGBA1C 5 9 (H) 09/07/2021       No results for input(s): POCGLU in the last 72 hours  Blood Sugar Average: Last 72 hrs:       Diet controlled  Will defer insulin regimen at this time

## 2022-08-31 ENCOUNTER — APPOINTMENT (OUTPATIENT)
Dept: RADIOLOGY | Facility: HOSPITAL | Age: 87
DRG: 193 | End: 2022-08-31
Payer: MEDICARE

## 2022-08-31 LAB
ALBUMIN SERPL BCP-MCNC: 2.3 G/DL (ref 3.5–5)
ALP SERPL-CCNC: 135 U/L (ref 46–116)
ALT SERPL W P-5'-P-CCNC: 12 U/L (ref 12–78)
ANION GAP SERPL CALCULATED.3IONS-SCNC: 5 MMOL/L (ref 4–13)
AST SERPL W P-5'-P-CCNC: 15 U/L (ref 5–45)
BASOPHILS # BLD AUTO: 0.06 THOUSANDS/ΜL (ref 0–0.1)
BASOPHILS NFR BLD AUTO: 0 % (ref 0–1)
BILIRUB SERPL-MCNC: 0.2 MG/DL (ref 0.2–1)
BUN SERPL-MCNC: 22 MG/DL (ref 5–25)
CALCIUM ALBUM COR SERPL-MCNC: 10.3 MG/DL (ref 8.3–10.1)
CALCIUM SERPL-MCNC: 8.9 MG/DL (ref 8.3–10.1)
CHLORIDE SERPL-SCNC: 105 MMOL/L (ref 96–108)
CO2 SERPL-SCNC: 29 MMOL/L (ref 21–32)
CREAT SERPL-MCNC: 0.55 MG/DL (ref 0.6–1.3)
EOSINOPHIL # BLD AUTO: 0.86 THOUSAND/ΜL (ref 0–0.61)
EOSINOPHIL NFR BLD AUTO: 4 % (ref 0–6)
ERYTHROCYTE [DISTWIDTH] IN BLOOD BY AUTOMATED COUNT: 14.9 % (ref 11.6–15.1)
GFR SERPL CREATININE-BSD FRML MDRD: 81 ML/MIN/1.73SQ M
GLUCOSE SERPL-MCNC: 86 MG/DL (ref 65–140)
HCT VFR BLD AUTO: 41.6 % (ref 34.8–46.1)
HGB BLD-MCNC: 11.3 G/DL (ref 11.5–15.4)
IMM GRANULOCYTES # BLD AUTO: 0.15 THOUSAND/UL (ref 0–0.2)
IMM GRANULOCYTES NFR BLD AUTO: 1 % (ref 0–2)
LYMPHOCYTES # BLD AUTO: 2.1 THOUSANDS/ΜL (ref 0.6–4.47)
LYMPHOCYTES NFR BLD AUTO: 9 % (ref 14–44)
MAGNESIUM SERPL-MCNC: 2.2 MG/DL (ref 1.6–2.6)
MCH RBC QN AUTO: 26.6 PG (ref 26.8–34.3)
MCHC RBC AUTO-ENTMCNC: 27.2 G/DL (ref 31.4–37.4)
MCV RBC AUTO: 98 FL (ref 82–98)
MONOCYTES # BLD AUTO: 0.9 THOUSAND/ΜL (ref 0.17–1.22)
MONOCYTES NFR BLD AUTO: 4 % (ref 4–12)
MRSA NOSE QL CULT: NORMAL
NEUTROPHILS # BLD AUTO: 18.85 THOUSANDS/ΜL (ref 1.85–7.62)
NEUTS SEG NFR BLD AUTO: 82 % (ref 43–75)
NRBC BLD AUTO-RTO: 0 /100 WBCS
PHOSPHATE SERPL-MCNC: 4.5 MG/DL (ref 2.3–4.1)
PLATELET # BLD AUTO: 237 THOUSANDS/UL (ref 149–390)
PMV BLD AUTO: 9.8 FL (ref 8.9–12.7)
POTASSIUM SERPL-SCNC: 4.5 MMOL/L (ref 3.5–5.3)
PROCALCITONIN SERPL-MCNC: 2.16 NG/ML
PROT SERPL-MCNC: 6.6 G/DL (ref 6.4–8.4)
RBC # BLD AUTO: 4.25 MILLION/UL (ref 3.81–5.12)
SODIUM SERPL-SCNC: 139 MMOL/L (ref 135–147)
WBC # BLD AUTO: 22.92 THOUSAND/UL (ref 4.31–10.16)

## 2022-08-31 PROCEDURE — 92610 EVALUATE SWALLOWING FUNCTION: CPT

## 2022-08-31 PROCEDURE — 94760 N-INVAS EAR/PLS OXIMETRY 1: CPT

## 2022-08-31 PROCEDURE — 99232 SBSQ HOSP IP/OBS MODERATE 35: CPT | Performed by: INTERNAL MEDICINE

## 2022-08-31 PROCEDURE — 74230 X-RAY XM SWLNG FUNCJ C+: CPT

## 2022-08-31 PROCEDURE — 84100 ASSAY OF PHOSPHORUS: CPT

## 2022-08-31 PROCEDURE — 80053 COMPREHEN METABOLIC PANEL: CPT

## 2022-08-31 PROCEDURE — 84145 PROCALCITONIN (PCT): CPT

## 2022-08-31 PROCEDURE — 83735 ASSAY OF MAGNESIUM: CPT

## 2022-08-31 PROCEDURE — 92611 MOTION FLUOROSCOPY/SWALLOW: CPT

## 2022-08-31 PROCEDURE — 85025 COMPLETE CBC W/AUTO DIFF WBC: CPT

## 2022-08-31 PROCEDURE — 94664 DEMO&/EVAL PT USE INHALER: CPT

## 2022-08-31 PROCEDURE — 94640 AIRWAY INHALATION TREATMENT: CPT

## 2022-08-31 RX ADMIN — MONTELUKAST 10 MG: 10 TABLET, FILM COATED ORAL at 22:15

## 2022-08-31 RX ADMIN — GUAIFENESIN 600 MG: 600 TABLET, EXTENDED RELEASE ORAL at 09:27

## 2022-08-31 RX ADMIN — Medication 12.5 MG: at 17:15

## 2022-08-31 RX ADMIN — DULOXETINE 20 MG: 20 CAPSULE, DELAYED RELEASE ORAL at 09:27

## 2022-08-31 RX ADMIN — ALPRAZOLAM 0.5 MG: 0.5 TABLET ORAL at 22:15

## 2022-08-31 RX ADMIN — GUAIFENESIN 600 MG: 600 TABLET, EXTENDED RELEASE ORAL at 22:18

## 2022-08-31 RX ADMIN — LEVALBUTEROL HYDROCHLORIDE 1.25 MG: 1.25 SOLUTION, CONCENTRATE RESPIRATORY (INHALATION) at 19:06

## 2022-08-31 RX ADMIN — CEFTRIAXONE SODIUM 1000 MG: 10 INJECTION, POWDER, FOR SOLUTION INTRAVENOUS at 10:55

## 2022-08-31 RX ADMIN — PANTOPRAZOLE SODIUM 40 MG: 40 TABLET, DELAYED RELEASE ORAL at 07:01

## 2022-08-31 RX ADMIN — DONEPEZIL HYDROCHLORIDE 10 MG: 10 TABLET ORAL at 22:15

## 2022-08-31 RX ADMIN — LEVALBUTEROL HYDROCHLORIDE 1.25 MG: 1.25 SOLUTION, CONCENTRATE RESPIRATORY (INHALATION) at 08:40

## 2022-08-31 RX ADMIN — ISODIUM CHLORIDE 3 ML: 0.03 SOLUTION RESPIRATORY (INHALATION) at 13:29

## 2022-08-31 RX ADMIN — BUDESONIDE 0.5 MG: 0.5 INHALANT ORAL at 08:40

## 2022-08-31 RX ADMIN — BUDESONIDE 0.5 MG: 0.5 INHALANT ORAL at 19:06

## 2022-08-31 RX ADMIN — GABAPENTIN 100 MG: 100 CAPSULE ORAL at 22:15

## 2022-08-31 RX ADMIN — ISODIUM CHLORIDE 3 ML: 0.03 SOLUTION RESPIRATORY (INHALATION) at 08:40

## 2022-08-31 RX ADMIN — LEVOTHYROXINE SODIUM 100 MCG: 100 TABLET ORAL at 07:01

## 2022-08-31 RX ADMIN — ASPIRIN 81 MG: 81 TABLET, COATED ORAL at 09:27

## 2022-08-31 RX ADMIN — Medication 12.5 MG: at 09:27

## 2022-08-31 RX ADMIN — Medication 1.5 MG: at 22:15

## 2022-08-31 RX ADMIN — LEVALBUTEROL HYDROCHLORIDE 1.25 MG: 1.25 SOLUTION, CONCENTRATE RESPIRATORY (INHALATION) at 13:29

## 2022-08-31 RX ADMIN — ENOXAPARIN SODIUM 40 MG: 40 INJECTION SUBCUTANEOUS at 09:27

## 2022-08-31 RX ADMIN — ATORVASTATIN CALCIUM 10 MG: 10 TABLET, FILM COATED ORAL at 22:15

## 2022-08-31 RX ADMIN — ACETAMINOPHEN 650 MG: 325 TABLET ORAL at 14:03

## 2022-08-31 RX ADMIN — DOCUSATE SODIUM 100 MG: 100 CAPSULE, LIQUID FILLED ORAL at 09:27

## 2022-08-31 NOTE — SPEECH THERAPY NOTE
Bedside Swallow Evaluation:    Summary:  Pt presented w/ s/s suggestive of mild oral and mild-moderate pharyngeal dysphagia  The patient is edentulous  She is assessed with puree solids and soft banana, with thin and nectar thick liquids  Mastication and transfer time is min prolonged with banana  Suspect swallow initiation time is at least min delayed with all  The patient has intermittent cough with banana, nectar thick and thin liquids  Caregiver is present who reports coughing occurs at home when eating or drinking  Chest xray is now suspicious for pneumonia  Recommendations:  Diet: Mechanical soft   Liquid: Thin   Meds: TBD following VBS   Supervision: Full   Positioning:Upright  Strategies: Small bites/sips   Pt to take PO/Meds only when fully alert and upright  Oral care: Frequent (4x day)  Aspiration precautions    Therapy Prognosis: Fair  Prognosis considerations: Coughing at bedside, age and new chest xray findings   Frequency: TBD pending VBS results     *VBS ordered to be completed today  Discussed with patient's family*    Consider consult w/:  Nutrition    Goal(s):  Pt will tolerate least restrictive diet w/out s/s aspiration or oral/pharyngeal difficulties  H&P/Admit info/ pertinent provider notes: (PMH noted above)  Patient is a 24-year-old female past medical history of dementia, COPD, diabetes type 2 diet controlled, GERD, hypertension, hyperlipidemia    She presented [facility] ED 08/30/2022 after her granddaughter and caregiver both noticed a change in her mental status    Patient is bedbound and is only able to perform self feeding and hair brushing  Relies on her granddaughter who is an NP, and healthcare aide to provider ADLs     Yesterday afternoon the granddaughter noticed the patient was will be more lethargic than usual   Temperature was taken at that time, 99 9 F   Overnight the patient was talking in her sleep more than usual   And then this morning the patient was not following commands like she usually does  This concerned the granddaughter and the patient was brought to the ED    Of note patient recently treated on 06/22 azithromycin for acute bronchitis    Denies any current RANDY  Lives with a granddaughter and has a caregiver  She is bed-bound at baseline  Can feed herself    Vitals in the ED:  98 3, , R 18, /76, SpO2 89% on RA, improved with 2 L O2 N/C    Labs notable for WBC count 34 3, procal 2 58     Chest x-ray with left greater than right bibasilar opacity  No effusion  No pneumothorax  Suggestive of pneumonia versus atelectasis  Underlying fibrosis    In the ED blood cultures were obtained  UA and culture were all ordered  Patient was started on cefepime and vanc    Per conversation with the daughter Robert Aguilera, patient is DNR DNI level 3  She will be admitted under SOD B for continued care of pneumonia    Per conversations with the daughter, there is no legal POA  Decisions for the patient are made by the daughter Neto Ng and the patient's son  Robert Aguilera can be called with questions she will share with her brother to make joint decisions for the patient  Special Studies:  Chest xray 8/30/2022:   Left greater than right bibasilar opacity, question atelectasis and/or pneumonia, with underlying fibrosis      Previous VBS:  None on record     Patient's goal: none stated     Did the pt report pain? no  If yes, was nursing notified/was it addressed? Reason for consult:  Nursing reported cough w/ PO  h/o dysphagia     Precautions:  Aspiration  Fall    Food allergies:  None-family avoids dairy products  Current diet:  Mechanical soft and thin liquids   Premorbid diet[de-identified]  Mechanical soft and thin liquids   O2 requirement:  RA  Voice/Speech:  WFL  Social/Prior living environment:  Lives at home  Dependent for ADL's   Has caregiver   Follows commands:  wfl                        Cognitive Status:  wfl  Oral mech exam:  Dentition: edentulous   Lips (VII): wfl  Tongue (XII): wfl  Mandible (V): wfl  Face/oral sensation (V): n/a  Velum (X): wfl  Secretion management: wfl  Volitional cough: n/a  Volitional swallow: wfl    Items administered:  Puree, soft solid, nectar thick liquid, thin liquids   Liquids were taken by straw     Oral stage:  Lip closure: wfl  Mastication: min prolonged  Bolus formation: wfl  Bolus control: wfl  Transfer: min delayed  Oral residue: no   Pocketing: no    Pharyngeal stage:  Swallow promptness: suspect to be delayed  Laryngeal rise: wfl  Wet voice: no  Throat clear: no  Cough: intermittent with banana and liquids  Secondary swallows: no  Audible swallows: no    Esophageal stage:  No s/s reported    Results d/w:  Pt, nursing, family, physician

## 2022-08-31 NOTE — PROCEDURES
Video Barium Swallow Study    Summary:  Images are on PACS for review  Oral stage: The patient presents with moderate oral dysphagia characterized by decreased strength to draw from straw, prolonged a-p transfer time with decreased bolus propulsion and decreased bolus control with occasional spillage over BOT  The patient is observed with oral holding at times  This leads to decreased control and eventual aspiration events (especially with thin liquids)  Pharyngeal stage: The patient presents with a moderate pharyngeal dysphagia characterized by delayed swallow initiation time with all, with spillage to the valleculae with all and to the pyriforms with nectar thick liquids  Silent aspiration observed x1 with nectar thick liquids via straw, but no aspiration events observed when patient takes nectar thick liquids via personal sippy cup  The patient has reduced control and reduced airway protection with thin liquids with resulting aspiration events  The patient is observed with significant cough response with watery eyes and reddened face  Poor coordination is observed when taking barium tablet with thin liquids with resulting aspiration  Per gross esophageal screen:  Some retention observed     Recommendations: results discussed with family at bedside  Diet: Puree  Liquids: Nectar   Meds: Whole in puree   Strategies: Small bites/sips  Frequent oral care  Upright position  F/u ST tx: Yes  Therapy Prognosis: Fair  Prognosis considerations: Age  Full Supervision    Aspiration Precautions  Reflux Precautions  Consider consult with: dietary ? supplements  Results reviewed with: nursing, family, physician    Aspiration precautions posted  Repeat VBS as necessary  If a dedicated assessment of the esophagus is desired, consider esophagram/barium swallow or EGD  Goals:  Pt will tolerate least restrictive diet w/out s/s aspiration or oral/pharyngeal difficulties      Patient's goal: none stated     H&P/Admit info/ pertinent provider notes: (PMH noted above)  Patient is a 80-year-old female past medical history of dementia, COPD, diabetes type 2 diet controlled, GERD, hypertension, hyperlipidemia    She presented [facility] ED 08/30/2022 after her granddaughter and caregiver both noticed a change in her mental status    Patient is bedbound and is only able to perform self feeding and hair brushing   Relies on her granddaughter who is an NP, and healthcare aide to provider ADLs     Yesterday afternoon the granddaughter noticed the patient was will be more lethargic than usual   Temperature was taken at that time, 99 9 F  Overnight the patient was talking in her sleep more than usual   And then this morning the patient was not following commands like she usually does   This concerned the granddaughter and the patient was brought to the ED    Of note patient recently treated on 06/22 azithromycin for acute bronchitis    Denies any current RANDY   Lives with a granddaughter and has a caregiver   She is bed-bound at baseline   Can feed herself    Vitals in the ED:  98 3, , R 18, /76, SpO2 89% on RA, improved with 2 L O2 N/C    Labs notable for WBC count 34 3, procal 2 58     Chest x-ray with left greater than right bibasilar opacity   No effusion   No pneumothorax   Suggestive of pneumonia versus atelectasis   Underlying fibrosis    In the ED blood cultures were obtained   UA and culture were all ordered   Patient was started on cefepime and vanc    Per conversation with the daughter Ender Ash is DNR DNI level 3  She will be admitted under SOD B for continued care of pneumonia    Per conversations with the daughter, there is no legal POA   Decisions for the patient are made by the daughter Indu Cardoso and the patient's son  Von Quinn can be called with questions she will share with her brother to make joint decisions for the patient      Special Studies:  Chest xray 8/30/2022:   Left greater than right bibasilar opacity, question atelectasis and/or pneumonia, with underlying fibrosis  Previous VBS:  None    Does the pt have pain? no  If yes, was nursing made aware/was it addressed? Precautions:  Aspiration, fall   Food Allergies:  None-family avoids dairy   Current Diet:  Mechanical soft and thin liquids    Premorbid diet:  Mechanical soft and thin liquids   Dentition:  Edentulous   O2 requirement:  2L NC  Oral mech:  Strength and ROM: wfl  Vocal Quality/Speech:  wfl  Cognitive status:  wfl    Consistencies administered: Puree, banana, honey thick liquid, nectar thick liquid, thin liquid, barium tablet with thin  Liquids were administered/taken by straw//tsp and patients personal sippy cup from home    Pt was viewed seated laterally at 90 degrees  Oral stage: Moderate  Lip closure: wfl  Mastication: min prolonged  Bolus formation: wfl  Bolus control: reduced  Transfer: prolonged  Residue: no    Pharyngeal stage:   Moderate  Swallow promptness: delayed with all   Spill to valleculae: with all   Spill to pyriforms: with nectar thick   Epiglottic inversion: wfl  Laryngeal excursion: wfl  Pharyngeal constriction: wfl  Vallecular retention: no  Pyriform retention: no  PPW coating: no  Osteophytes: no  CP prominence: no  Retropulsion from prominence: n/a  Transient penetration: x1 with HTL tsp  Epiglottic undercoat: no  Penetration: x1 with NTL sippy cup   Aspiration: x1 with NTL straw; with thin liquids sippy cup and barium tablet  Strategies: n/a  Response to aspiration: significant cough/ reddened face and watery eyes    Screening of Esophageal stage:  Retention/Stasis

## 2022-08-31 NOTE — PROGRESS NOTES
INTERNAL MEDICINE RESIDENCY PROGRESS NOTE     Name: Jason Juarez   Age & Sex: 80 y o  female   MRN: [id number]  Unit/Bed#: CW2 211-01   Encounter: [id number]  Team: SOD Team B     PATIENT INFORMATION     Name: Jason Juarez   Age & Sex: 80 y o  female   MRN: [id number]  Hospital Stay Days: 1    ASSESSMENT/PLAN     Principal Problem:    Pneumonia  Active Problems:    Type 2 diabetes mellitus with hemoglobin A1c goal of less than 7 0% (HCC)    CAD (coronary artery disease), native coronary artery    Intermittent asthma    Abdominal aortic aneurysm (AAA) 3 0 cm to 5 0 cm in diameter in female [facility])    Hypothyroidism    Dyslipidemia, goal LDL below 70    Pulmonary fibrosis (HCC)    Dementia due to medical condition without behavioral disturbance ([facility])    COPD, severity to be determined ([facility] )    Hypertension      Hypertension  Assessment & Plan  Continue metoprolol tartrate 12 5 mg b i d     COPD, severity to be determined [facility])  Assessment & Plan  Continue Singular and bronchodilators    Dementia due to medical condition without behavioral disturbance ([facility] )  Assessment & Plan  Continue home regimen of alprazolam, donepezil, duloxetine, melatonin, gabapentin  Per family, patient is occasionally tried to get out of bed at night  Plan:  · Family has had to use soft restraints occasionally at home  · Will defer soft restraints at this time  · If attempts to redirect and virtual continuous observation fail, can consider soft restraints    Pulmonary fibrosis ([facility] )  Assessment & Plan  Noted on imaging    Dyslipidemia, goal LDL below 70  Assessment & Plan  Continue atorvastatin    Hypothyroidism  Assessment & Plan  Continue levothyroxine    Abdominal aortic aneurysm (AAA) 3 0 cm to 5 0 cm in diameter in female [facility])  Assessment & Plan  5 cm infrarenal AAA      Plan:  · Outpatient monitor    Intermittent asthma  Assessment & Plan  Continue Singulair and bronchodilators    CAD (coronary artery disease), native coronary artery  Assessment & Plan  Continue aspirin, statin    Type 2 diabetes mellitus with hemoglobin A1c goal of less than 7 0% [facility])  Assessment & Plan  Lab Results   Component Value Date    HGBA1C 5 9 (H) 2021       No results for input(s): POCGLU in the last 72 hours  Blood Sugar Average: Last 72 hrs:       Diet controlled  Will defer insulin regimen at this time  * Pneumonia  Assessment & Plan  Drip Score 2 (antibiotic use, COPD), Severe CAP score 2 (confusion, BUN)  Patient was started on cefepime and vancomycin in the ED  COVID negative, likely non severe CAP bacterial pneumonia    Plan:  · Will step-down patient to monotherapy with ceftriaxone per algorithm  · MRSA culture pending  · UA also drawn in ED, however believe this is more pneumonia of UTI  · Trend CBC, fever curve  · O2 N/C to maintain O2 sats above 88%  · Gentle hydration          Disposition: Inpatient  PT/OT eval and speech eval pending  SUBJECTIVE     Patient seen and examined  No acute events overnight  Patient denies SOB, CP, fever, abdominal pain  Endorses mild coughing  OBJECTIVE     Vitals:    22 2323 22 0712 22 0712 22 0840   BP: 124/67 152/88 152/88    BP Location:       Pulse:  91 96    Resp:       Temp: 97 7 °F (36 5 °C)      TempSrc:       SpO2:  97% 97% 95%   Weight:          Temperature:   Temp (24hrs), Av 7 °F (36 5 °C), Min:97 7 °F (36 5 °C), Max:97 7 °F (36 5 °C)    Temperature: 97 7 °F (36 5 °C)  Intake & Output:  I/O        07 07 07 07 07 0700    IV Piggyback  250     Total Intake(mL/kg)  250 (5 8)     Net  +250            Unmeasured Urine Occurrence   2 x        Weights:        Body mass index is 18 43 kg/m²  Weight (last 2 days)     Date/Time Weight    22 1113 42 8 (94 36)        Physical Exam  Constitutional:       General: She is not in acute distress       Comments: Patient is cachetic   HENT:      Head: Normocephalic  Right Ear: External ear normal       Left Ear: External ear normal    Eyes:      General: No scleral icterus  Extraocular Movements: Extraocular movements intact  Cardiovascular:      Rate and Rhythm: Normal rate and regular rhythm  Pulses: Normal pulses  Heart sounds: Normal heart sounds  No murmur heard  No friction rub  No gallop  Pulmonary:      Breath sounds: Normal breath sounds  Abdominal:      General: Bowel sounds are normal       Palpations: Abdomen is soft  Tenderness: There is no abdominal tenderness  There is no guarding or rebound  Musculoskeletal:      Cervical back: Normal range of motion  Right lower leg: No edema  Left lower leg: No edema  Skin:     General: Skin is warm and dry  Neurological:      Mental Status: Mental status is at baseline  She is disoriented  Psychiatric:         Mood and Affect: Mood normal          Behavior: Behavior normal             LABORATORY DATA     Labs: I have personally reviewed pertinent reports    Results from last 7 days   Lab Units 08/31/22  0505 08/30/22  1226   WBC Thousand/uL 22 92* 34 33*   HEMOGLOBIN g/dL 11 3* 11 6   HEMATOCRIT % 41 6 38 6   PLATELETS Thousands/uL 237 335   NEUTROS PCT % 82*  --    MONOS PCT % 4  --    MONO PCT %  --  3*      Results from last 7 days   Lab Units 08/31/22  0543 08/30/22  1226   POTASSIUM mmol/L 4 5 4 3   CHLORIDE mmol/L 105 105   CO2 mmol/L 29 30   BUN mg/dL 22 20   CREATININE mg/dL 0 55* 0 54*   CALCIUM mg/dL 8 9 8 6   ALK PHOS U/L 135* 153*   ALT U/L 12 14   AST U/L 15 19     Results from last 7 days   Lab Units 08/31/22  0543   MAGNESIUM mg/dL 2 2     Results from last 7 days   Lab Units 08/31/22  0543   PHOSPHORUS mg/dL 4 5*      Results from last 7 days   Lab Units 08/30/22  1226   INR  1 04   PTT seconds 31     Results from last 7 days   Lab Units 08/30/22  1226   LACTIC ACID mmol/L 1 2           IMAGING & DIAGNOSTIC TESTING     Radiology Results: I have personally reviewed pertinent reports  XR chest portable - 1 view    Result Date: 8/30/2022  Impression: Left greater than right bibasilar opacity, question atelectasis and/or pneumonia, with underlying fibrosis  Workstation performed: [id number]     Other Diagnostic Testing: I have personally reviewed pertinent reports      ACTIVE MEDICATIONS     Current Facility-Administered Medications   Medication Dose Route Frequency    acetaminophen (TYLENOL) tablet 650 mg  650 mg Oral Q6H PRN    albuterol inhalation solution 2 5 mg  2 5 mg Nebulization Q4H PRN    ALPRAZolam (XANAX) tablet 0 5 mg  0 5 mg Oral HS    aspirin (ECOTRIN LOW STRENGTH) EC tablet 81 mg  81 mg Oral Daily    atorvastatin (LIPITOR) tablet 10 mg  10 mg Oral HS    budesonide (PULMICORT) inhalation solution 0 5 mg  0 5 mg Nebulization Q12H    ceftriaxone (ROCEPHIN) 1 g/50 mL in dextrose IVPB  1,000 mg Intravenous Q24H    docusate sodium (COLACE) capsule 100 mg  100 mg Oral Daily    donepezil (ARICEPT) tablet 10 mg  10 mg Oral HS    DULoxetine (CYMBALTA) delayed release capsule 20 mg  20 mg Oral Daily    enoxaparin (LOVENOX) subcutaneous injection 40 mg  40 mg Subcutaneous Daily    gabapentin (NEURONTIN) capsule 100 mg  100 mg Oral HS    guaiFENesin (MUCINEX) 12 hr tablet 600 mg  600 mg Oral Q12H CHERYL    levalbuterol (XOPENEX) inhalation solution 1 25 mg  1 25 mg Nebulization TID    levothyroxine tablet 100 mcg  100 mcg Oral Early Morning    melatonin tablet 1 5 mg  1 5 mg Oral HS    metoprolol tartrate (LOPRESSOR) partial tablet 12 5 mg  12 5 mg Oral BID    montelukast (SINGULAIR) tablet 10 mg  10 mg Oral HS    multi-electrolyte (PLASMALYTE-A/ISOLYTE-S PH 7 4) IV solution  50 mL/hr Intravenous Continuous    pantoprazole (PROTONIX) EC tablet 40 mg  40 mg Oral Early Morning    sodium chloride 0 9 % inhalation solution 3 mL  3 mL Nebulization TID       VTE Pharmacologic Prophylaxis: Enoxaparin (Lovenox)  VTE Mechanical Prophylaxis: sequential compression device    Portions of the record may have been created with voice recognition software  Occasional wrong word or "sound a like" substitutions may have occurred due to the inherent limitations of voice recognition software    Read the chart carefully and recognize, using context, where substitutions have occurred   ==  Alena Olivier DO  [address]  Internal Medicine Residency PGY-2

## 2022-09-01 PROBLEM — G93.41 METABOLIC ENCEPHALOPATHY: Status: ACTIVE | Noted: 2022-01-01

## 2022-09-01 PROBLEM — R63.6 UNDERWEIGHT: Status: ACTIVE | Noted: 2022-01-01

## 2022-09-01 LAB
ANION GAP SERPL CALCULATED.3IONS-SCNC: 4 MMOL/L (ref 4–13)
BACTERIA UR CULT: ABNORMAL
BACTERIA UR CULT: ABNORMAL
BASOPHILS # BLD AUTO: 0.03 THOUSANDS/ΜL (ref 0–0.1)
BASOPHILS NFR BLD AUTO: 0 % (ref 0–1)
BUN SERPL-MCNC: 18 MG/DL (ref 5–25)
CALCIUM SERPL-MCNC: 8.9 MG/DL (ref 8.3–10.1)
CHLORIDE SERPL-SCNC: 103 MMOL/L (ref 96–108)
CO2 SERPL-SCNC: 32 MMOL/L (ref 21–32)
CREAT SERPL-MCNC: 0.47 MG/DL (ref 0.6–1.3)
EOSINOPHIL # BLD AUTO: 0.49 THOUSAND/ΜL (ref 0–0.61)
EOSINOPHIL NFR BLD AUTO: 4 % (ref 0–6)
ERYTHROCYTE [DISTWIDTH] IN BLOOD BY AUTOMATED COUNT: 14.6 % (ref 11.6–15.1)
GFR SERPL CREATININE-BSD FRML MDRD: 86 ML/MIN/1.73SQ M
GLUCOSE SERPL-MCNC: 85 MG/DL (ref 65–140)
HCT VFR BLD AUTO: 40.8 % (ref 34.8–46.1)
HGB BLD-MCNC: 12 G/DL (ref 11.5–15.4)
IMM GRANULOCYTES # BLD AUTO: 0.09 THOUSAND/UL (ref 0–0.2)
IMM GRANULOCYTES NFR BLD AUTO: 1 % (ref 0–2)
L PNEUMO1 AG UR QL IA.RAPID: NEGATIVE
LYMPHOCYTES # BLD AUTO: 1.71 THOUSANDS/ΜL (ref 0.6–4.47)
LYMPHOCYTES NFR BLD AUTO: 13 % (ref 14–44)
MCH RBC QN AUTO: 26.7 PG (ref 26.8–34.3)
MCHC RBC AUTO-ENTMCNC: 29.4 G/DL (ref 31.4–37.4)
MCV RBC AUTO: 91 FL (ref 82–98)
MONOCYTES # BLD AUTO: 0.76 THOUSAND/ΜL (ref 0.17–1.22)
MONOCYTES NFR BLD AUTO: 6 % (ref 4–12)
NEUTROPHILS # BLD AUTO: 10.41 THOUSANDS/ΜL (ref 1.85–7.62)
NEUTS SEG NFR BLD AUTO: 76 % (ref 43–75)
NRBC BLD AUTO-RTO: 0 /100 WBCS
PLATELET # BLD AUTO: 303 THOUSANDS/UL (ref 149–390)
PMV BLD AUTO: 9.6 FL (ref 8.9–12.7)
POTASSIUM SERPL-SCNC: 4.3 MMOL/L (ref 3.5–5.3)
RBC # BLD AUTO: 4.49 MILLION/UL (ref 3.81–5.12)
S PNEUM AG UR QL: NEGATIVE
SODIUM SERPL-SCNC: 139 MMOL/L (ref 135–147)
WBC # BLD AUTO: 13.49 THOUSAND/UL (ref 4.31–10.16)

## 2022-09-01 PROCEDURE — 99232 SBSQ HOSP IP/OBS MODERATE 35: CPT | Performed by: INTERNAL MEDICINE

## 2022-09-01 PROCEDURE — 80048 BASIC METABOLIC PNL TOTAL CA: CPT | Performed by: STUDENT IN AN ORGANIZED HEALTH CARE EDUCATION/TRAINING PROGRAM

## 2022-09-01 PROCEDURE — 92526 ORAL FUNCTION THERAPY: CPT

## 2022-09-01 PROCEDURE — 94640 AIRWAY INHALATION TREATMENT: CPT

## 2022-09-01 PROCEDURE — 87449 NOS EACH ORGANISM AG IA: CPT | Performed by: STUDENT IN AN ORGANIZED HEALTH CARE EDUCATION/TRAINING PROGRAM

## 2022-09-01 PROCEDURE — 85025 COMPLETE CBC W/AUTO DIFF WBC: CPT | Performed by: STUDENT IN AN ORGANIZED HEALTH CARE EDUCATION/TRAINING PROGRAM

## 2022-09-01 PROCEDURE — 94760 N-INVAS EAR/PLS OXIMETRY 1: CPT

## 2022-09-01 RX ADMIN — LEVALBUTEROL HYDROCHLORIDE 1.25 MG: 1.25 SOLUTION, CONCENTRATE RESPIRATORY (INHALATION) at 13:47

## 2022-09-01 RX ADMIN — ATORVASTATIN CALCIUM 10 MG: 10 TABLET, FILM COATED ORAL at 21:22

## 2022-09-01 RX ADMIN — CEFTRIAXONE SODIUM 1000 MG: 10 INJECTION, POWDER, FOR SOLUTION INTRAVENOUS at 12:19

## 2022-09-01 RX ADMIN — GUAIFENESIN 600 MG: 600 TABLET, EXTENDED RELEASE ORAL at 08:28

## 2022-09-01 RX ADMIN — DONEPEZIL HYDROCHLORIDE 10 MG: 10 TABLET ORAL at 21:22

## 2022-09-01 RX ADMIN — Medication 1.5 MG: at 21:22

## 2022-09-01 RX ADMIN — LEVALBUTEROL HYDROCHLORIDE 1.25 MG: 1.25 SOLUTION, CONCENTRATE RESPIRATORY (INHALATION) at 07:27

## 2022-09-01 RX ADMIN — DOCUSATE SODIUM 100 MG: 100 CAPSULE, LIQUID FILLED ORAL at 08:27

## 2022-09-01 RX ADMIN — BUDESONIDE 0.5 MG: 0.5 INHALANT ORAL at 07:27

## 2022-09-01 RX ADMIN — Medication 12.5 MG: at 08:27

## 2022-09-01 RX ADMIN — ENOXAPARIN SODIUM 40 MG: 40 INJECTION SUBCUTANEOUS at 08:27

## 2022-09-01 RX ADMIN — GUAIFENESIN 600 MG: 600 TABLET, EXTENDED RELEASE ORAL at 21:22

## 2022-09-01 RX ADMIN — ISODIUM CHLORIDE 3 ML: 0.03 SOLUTION RESPIRATORY (INHALATION) at 13:47

## 2022-09-01 RX ADMIN — ASPIRIN 81 MG: 81 TABLET, COATED ORAL at 08:27

## 2022-09-01 RX ADMIN — ALPRAZOLAM 0.5 MG: 0.5 TABLET ORAL at 21:22

## 2022-09-01 RX ADMIN — LEVALBUTEROL HYDROCHLORIDE 1.25 MG: 1.25 SOLUTION, CONCENTRATE RESPIRATORY (INHALATION) at 19:00

## 2022-09-01 RX ADMIN — PANTOPRAZOLE SODIUM 40 MG: 40 TABLET, DELAYED RELEASE ORAL at 06:31

## 2022-09-01 RX ADMIN — MONTELUKAST 10 MG: 10 TABLET, FILM COATED ORAL at 21:22

## 2022-09-01 RX ADMIN — GABAPENTIN 100 MG: 100 CAPSULE ORAL at 21:22

## 2022-09-01 RX ADMIN — Medication 12.5 MG: at 17:19

## 2022-09-01 RX ADMIN — LEVOTHYROXINE SODIUM 100 MCG: 100 TABLET ORAL at 06:31

## 2022-09-01 RX ADMIN — BUDESONIDE 0.5 MG: 0.5 INHALANT ORAL at 19:00

## 2022-09-01 RX ADMIN — ISODIUM CHLORIDE 3 ML: 0.03 SOLUTION RESPIRATORY (INHALATION) at 07:27

## 2022-09-01 RX ADMIN — DULOXETINE 20 MG: 20 CAPSULE, DELAYED RELEASE ORAL at 08:27

## 2022-09-01 NOTE — PROGRESS NOTES
INTERNAL MEDICINE RESIDENCY PROGRESS NOTE     Name: Marquis Perez   Age & Sex: 80 y o  female   MRN: [id number]  Unit/Bed#: 2 211-01   Encounter: [id number]  Team: SOD Team B     PATIENT INFORMATION     Name: Marquis Perez   Age & Sex: 80 y o  female   MRN: [id number]  Hospital Stay Days: 2    ASSESSMENT/PLAN     Principal Problem:    Pneumonia  Active Problems:    Type 2 diabetes mellitus with hemoglobin A1c goal of less than 7 0% (HCC)    CAD (coronary artery disease), native coronary artery    Intermittent asthma    Abdominal aortic aneurysm (AAA) 3 0 cm to 5 0 cm in diameter in female [facility])    Hypothyroidism    Dyslipidemia, goal LDL below 70    Pulmonary fibrosis (HCC)    Dementia due to medical condition without behavioral disturbance ([facility])    COPD, severity to be determined ([facility] )    Hypertension    Underweight    Metabolic encephalopathy      * Pneumonia  Assessment & Plan  Drip Score 2 (antibiotic use, COPD), Severe CAP score 2 (confusion, BUN)  Patient was started on cefepime and vancomycin in the ED  COVID negative, likely non severe CAP bacterial pneumonia    Plan:  · Will step-down patient to monotherapy with ceftriaxone per algorithm  · Will consider transition to PO antibiotics tomorrow, hopeful for discharge thereafter  · MRSA negative  · UA also drawn in ED, however believe this is more pneumonia than UTI  · Trend CBC, fever curve  · O2 N/C to maintain O2 sats above 88%  · Gentle hydration      Metabolic encephalopathy  Assessment & Plan  2/2 PNA and possible underlying UTI, as evidenced by confusion greater than baseline dementia, lethargy, and inability to follow commands, requiring treatment for underlying infection, monitoring of mental status and supportive care  · Continue treatment with antibiotics  · Patients mentation improving, back to baseline  Encephalopathy resolved       Underweight  Assessment & Plan  As evidenced by BMI 18 43, in the setting of relatively stable weight over past year, with family reporting poor PO intake over past week, requiring Mechanical soft regular diet, monitoring of PO intake and assistance with meals as needed         Findings:  BMI 18 43,  HT 5'0,  WT 94 lb 5 7 oz  04/19/2022:  94 lb6 4 oz  09/07/2021:  96 lb    Patient reports decreased appetite and productive cough approximately 1 week ago  Continue speech therapy for swallowing eval    Hypertension  Assessment & Plan  Continue metoprolol tartrate 12 5 mg b i d     COPD, severity to be determined [facility])  Assessment & Plan  Continue Singular and bronchodilators    Dementia due to medical condition without behavioral disturbance (HCC)  Assessment & Plan  Continue home regimen of alprazolam, donepezil, duloxetine, melatonin, gabapentin  Per family, patient is occasionally tried to get out of bed at night  Plan:  · Family has had to use soft restraints occasionally at home  · Will defer soft restraints at this time  · If attempts to redirect and virtual continuous observation fail, can consider soft restraints    Pulmonary fibrosis ([facility] )  Assessment & Plan  Noted on imaging    Dyslipidemia, goal LDL below 70  Assessment & Plan  Continue atorvastatin    Hypothyroidism  Assessment & Plan  Continue levothyroxine    Abdominal aortic aneurysm (AAA) 3 0 cm to 5 0 cm in diameter in female [facility])  Assessment & Plan  5 cm infrarenal AAA  Plan:  · Outpatient monitor    Intermittent asthma  Assessment & Plan  Continue Singulair and bronchodilators    CAD (coronary artery disease), native coronary artery  Assessment & Plan  Continue aspirin, statin    Type 2 diabetes mellitus with hemoglobin A1c goal of less than 7 0% [facility])  Assessment & Plan  Lab Results   Component Value Date    HGBA1C 5 9 (H) 09/07/2021       No results for input(s): POCGLU in the last 72 hours  Blood Sugar Average: Last 72 hrs:       Diet controlled  Will defer insulin regimen at this time      Disposition: Pending PT eval  Anticipate patient will be stable for discharge within the next 24-48 hours  SUBJECTIVE     Patient seen and examined  No acute events overnight  Does not appear to be in acute distress  Denies SOB, CP cough, abdominal pain, headache, dizziness  OBJECTIVE     Vitals:    22 1718 22 1906 22 2225 22 0712   BP: 126/55  (!) 161/113 132/75   BP Location:    Right arm   Pulse:   83 80   Resp:    16   Temp:   97 9 °F (36 6 °C) 97 8 °F (36 6 °C)   TempSrc:    Oral   SpO2:  95% 95% 95%   Weight:          Temperature:   Temp (24hrs), Av 9 °F (36 6 °C), Min:97 8 °F (36 6 °C), Max:97 9 °F (36 6 °C)    Temperature: 97 8 °F (36 6 °C)  Intake & Output:  I/O        0701   0700  0701   0700  0701   0700    P  O   240 120    IV Piggyback 250      Total Intake(mL/kg) 250 (5 8) 240 (5 6) 120 (2 8)    Urine (mL/kg/hr)   980 (3 4)    Total Output   980    Net +250 +240 -860           Unmeasured Urine Occurrence  5 x         Weights:        Body mass index is 18 43 kg/m²  Weight (last 2 days)     Date/Time Weight    22 1113 42 8 (94 36)        Physical Exam  Constitutional:       General: She is not in acute distress  Comments: Cachetic  HENT:      Head: Normocephalic  Right Ear: External ear normal       Left Ear: External ear normal       Nose: Nose normal    Eyes:      General: No scleral icterus  Extraocular Movements: Extraocular movements intact  Cardiovascular:      Rate and Rhythm: Regular rhythm  Tachycardia present  Pulses: Normal pulses  Heart sounds: Normal heart sounds  Pulmonary:      Effort: Pulmonary effort is normal       Breath sounds: Normal breath sounds  Abdominal:      General: Bowel sounds are normal       Palpations: Abdomen is soft  Tenderness: There is no abdominal tenderness  There is no guarding or rebound  Skin:     General: Skin is warm and dry  Neurological:      Mental Status: Mental status is at baseline  LABORATORY DATA     Labs: I have personally reviewed pertinent reports  Results from last 7 days   Lab Units 09/01/22  0700 08/31/22  0505 08/30/22  1226   WBC Thousand/uL 13 49* 22 92* 34 33*   HEMOGLOBIN g/dL 12 0 11 3* 11 6   HEMATOCRIT % 40 8 41 6 38 6   PLATELETS Thousands/uL 303 237 335   NEUTROS PCT % 76* 82*  --    MONOS PCT % 6 4  --    MONO PCT %  --   --  3*      Results from last 7 days   Lab Units 09/01/22  0700 08/31/22  0543 08/30/22  1226   POTASSIUM mmol/L 4 3 4 5 4 3   CHLORIDE mmol/L 103 105 105   CO2 mmol/L 32 29 30   BUN mg/dL 18 22 20   CREATININE mg/dL 0 47* 0 55* 0 54*   CALCIUM mg/dL 8 9 8 9 8 6   ALK PHOS U/L  --  135* 153*   ALT U/L  --  12 14   AST U/L  --  15 19     Results from last 7 days   Lab Units 08/31/22  0543   MAGNESIUM mg/dL 2 2     Results from last 7 days   Lab Units 08/31/22  0543   PHOSPHORUS mg/dL 4 5*      Results from last 7 days   Lab Units 08/30/22  1226   INR  1 04   PTT seconds 31     Results from last 7 days   Lab Units 08/30/22  1226   LACTIC ACID mmol/L 1 2           IMAGING & DIAGNOSTIC TESTING     Radiology Results: I have personally reviewed pertinent reports  XR chest portable - 1 view    Result Date: 8/30/2022  Impression: Left greater than right bibasilar opacity, question atelectasis and/or pneumonia, with underlying fibrosis  Workstation performed: [id number]     Other Diagnostic Testing: I have personally reviewed pertinent reports      ACTIVE MEDICATIONS     Current Facility-Administered Medications   Medication Dose Route Frequency    acetaminophen (TYLENOL) tablet 650 mg  650 mg Oral Q6H PRN    albuterol inhalation solution 2 5 mg  2 5 mg Nebulization Q4H PRN    ALPRAZolam (XANAX) tablet 0 5 mg  0 5 mg Oral HS    aspirin (ECOTRIN LOW STRENGTH) EC tablet 81 mg  81 mg Oral Daily    atorvastatin (LIPITOR) tablet 10 mg  10 mg Oral HS    budesonide (PULMICORT) inhalation solution 0 5 mg  0 5 mg Nebulization Q12H    ceftriaxone (ROCEPHIN) 1 g/50 mL in dextrose IVPB  1,000 mg Intravenous Q24H    docusate sodium (COLACE) capsule 100 mg  100 mg Oral Daily    donepezil (ARICEPT) tablet 10 mg  10 mg Oral HS    DULoxetine (CYMBALTA) delayed release capsule 20 mg  20 mg Oral Daily    enoxaparin (LOVENOX) subcutaneous injection 40 mg  40 mg Subcutaneous Daily    gabapentin (NEURONTIN) capsule 100 mg  100 mg Oral HS    guaiFENesin (MUCINEX) 12 hr tablet 600 mg  600 mg Oral Q12H CHERYL    levalbuterol (XOPENEX) inhalation solution 1 25 mg  1 25 mg Nebulization TID    levothyroxine tablet 100 mcg  100 mcg Oral Early Morning    melatonin tablet 1 5 mg  1 5 mg Oral HS    metoprolol tartrate (LOPRESSOR) partial tablet 12 5 mg  12 5 mg Oral BID    montelukast (SINGULAIR) tablet 10 mg  10 mg Oral HS    pantoprazole (PROTONIX) EC tablet 40 mg  40 mg Oral Early Morning    sodium chloride 0 9 % inhalation solution 3 mL  3 mL Nebulization TID       VTE Pharmacologic Prophylaxis: Enoxaparin (Lovenox)  VTE Mechanical Prophylaxis: sequential compression device    Portions of the record may have been created with voice recognition software  Occasional wrong word or "sound a like" substitutions may have occurred due to the inherent limitations of voice recognition software    Read the chart carefully and recognize, using context, where substitutions have occurred   ==  Izzy Malloy DO  [facility]  Internal Medicine Residency PGY-2

## 2022-09-01 NOTE — OCCUPATIONAL THERAPY NOTE
OT CANCEL NOTE     Pt chart reviewed  Per EMR, pt is dependent for ADLS and bed/wheelchair bound  Lives w/ granddght who is pt's caregiver and has aides 13 hrs day/7days week  Pt has no immediate acute skilled OT needs based on her baseline functional level   Will D/C OT        09/01/22 0736   OT Last Visit   OT Visit Date 09/01/22   Note Type   Note type Screen       Kush Meadows MS, OTR/L

## 2022-09-01 NOTE — SPEECH THERAPY NOTE
Speech Language/Pathology    Speech/Language Pathology Progress Note    Patient Name: Tiffani Mercer  Today's Date: 9/1/2022     Problem List  Principal Problem:    Pneumonia  Active Problems:    Type 2 diabetes mellitus with hemoglobin A1c goal of less than 7 0% ([facility])    CAD (coronary artery disease), native coronary artery    Intermittent asthma    Abdominal aortic aneurysm (AAA) 3 0 cm to 5 0 cm in diameter in female [facility])    Hypothyroidism    Dyslipidemia, goal LDL below 70    Pulmonary fibrosis ([facility])    Dementia due to medical condition without behavioral disturbance ([facility])    COPD, severity to be determined ([facility] )    Hypertension       Past Medical History  Past Medical History:   Diagnosis Date    Bell's palsy remote    Coronary artery disease     Diabetes mellitus ([facility] )     Disease of thyroid gland     GERD (gastroesophageal reflux disease)     Hip fracture ([facility])     Hyperlipidemia     Hypertension     Psychiatric disorder     Stroke [facility])         Past Surgical History  History reviewed  No pertinent surgical history  Subjective:  "It's ok" Patient asleep, but wakes to verbal and tactile cues  Daughter is present  Objective: The patient is seen at lunch meal for dysphagia therapy  SLP feeds patient  She is assessed with puree solids (mashed potatoes, carrots and turkey) with nectar thick liquids (soda and juice)  The patient has good retrieval of items via tsp  She has some struggle with draw from straw with nectar thick liquids, at times  The patient continues with delayed a-p transfer time and suspected delayed swallow initiation time  Kelsie List is initially not thickened enough and the patient is observed with intermittent coughing with tsp and straw sips  More thickened is added and patient tolerates better  She is able to take pre thickened nectar thick juice via sippy cup and tolerates with no overt s/s aspiration  Intake is ~50% at lunch meal today   Patient has no complaints of food or liquid texture at this time  Assessment:  The patient is tolerating puree solids and nectar thick liquids well  She is observed with coughing on liquids not thickened enough  Plan/Recommendations:  Continue puree diet with nectar thick liquids  Continue ST to further assess tolerance

## 2022-09-01 NOTE — ASSESSMENT & PLAN NOTE
As evidenced by BMI 18 43, in the setting of relatively stable weight over past year, with family reporting poor PO intake over past week, requiring Mechanical soft regular diet, monitoring of PO intake and assistance with meals as needed         Findings:  BMI 18 43,  HT 5'0,  WT 94 lb 5 7 oz  04/19/2022:  94 lb6 4 oz  09/07/2021:  96 lb    Patient reports decreased appetite and productive cough approximately 1 week ago     Continue speech therapy for swallowing eval

## 2022-09-01 NOTE — MALNUTRITION/BMI
This medical record reflects one or more clinical indicators suggestive of malnutrition and/or morbid obesity  BMI Findings:  Adult BMI Classifications: Underweight < 18 5        Body mass index is 18 43 kg/m²  See Nutrition note dated 9/1/22 for additional details  Completed nutrition assessment is viewable in the nutrition documentation

## 2022-09-01 NOTE — ASSESSMENT & PLAN NOTE
2/2 PNA and possible underlying UTI, as evidenced by confusion greater than baseline dementia, lethargy, and inability to follow commands, requiring treatment for underlying infection, monitoring of mental status and supportive care  · Continue treatment with antibiotics  · Patients mentation improving, back to baseline  Encephalopathy resolved

## 2022-09-01 NOTE — PHYSICAL THERAPY NOTE
PT orders received  Chart reviewed  Per EMR patient is dependent at baseline for ADL's and IADL's  Pt is also bed and w/c bound  Pt lives with her granddaughter who is her main caregiver  Pt also has assist from aides  Due to her baseline patient pt will be D/C from PT at this time    Liat Back, PT, DPT     09/01/22 1620   PT Last Visit   PT Visit Date 09/01/22   Note Type   Note type Screen

## 2022-09-02 VITALS
OXYGEN SATURATION: 95 % | BODY MASS INDEX: 18.43 KG/M2 | TEMPERATURE: 99.1 F | SYSTOLIC BLOOD PRESSURE: 117 MMHG | RESPIRATION RATE: 17 BRPM | WEIGHT: 94.36 LBS | HEART RATE: 80 BPM | DIASTOLIC BLOOD PRESSURE: 64 MMHG

## 2022-09-02 LAB
BACTERIA BLD CULT: ABNORMAL
DME PARACHUTE DELIVERY DATE REQUESTED: NORMAL
DME PARACHUTE ITEM DESCRIPTION: NORMAL
DME PARACHUTE ORDER STATUS: NORMAL
DME PARACHUTE SUPPLIER NAME: NORMAL
DME PARACHUTE SUPPLIER PHONE: NORMAL
GRAM STN SPEC: ABNORMAL
S AUREUS+CONS DNA BLD POS NAA+NON-PROBE: DETECTED

## 2022-09-02 PROCEDURE — 94640 AIRWAY INHALATION TREATMENT: CPT

## 2022-09-02 PROCEDURE — 94761 N-INVAS EAR/PLS OXIMETRY MLT: CPT

## 2022-09-02 PROCEDURE — 92526 ORAL FUNCTION THERAPY: CPT

## 2022-09-02 PROCEDURE — 99238 HOSP IP/OBS DSCHRG MGMT 30/<: CPT | Performed by: INTERNAL MEDICINE

## 2022-09-02 PROCEDURE — NC001 PR NO CHARGE: Performed by: INTERNAL MEDICINE

## 2022-09-02 PROCEDURE — 94760 N-INVAS EAR/PLS OXIMETRY 1: CPT

## 2022-09-02 RX ORDER — CEFUROXIME AXETIL 250 MG/1
500 TABLET ORAL EVERY 12 HOURS SCHEDULED
Status: DISCONTINUED | OUTPATIENT
Start: 2022-09-03 | End: 2022-09-02 | Stop reason: HOSPADM

## 2022-09-02 RX ORDER — GABAPENTIN 100 MG/1
100 CAPSULE ORAL
Qty: 90 CAPSULE | Refills: 0 | Status: SHIPPED | OUTPATIENT
Start: 2022-09-02

## 2022-09-02 RX ORDER — CEFUROXIME AXETIL 500 MG/1
500 TABLET ORAL EVERY 12 HOURS SCHEDULED
Qty: 2 TABLET | Refills: 0 | Status: SHIPPED | OUTPATIENT
Start: 2022-09-03 | End: 2022-09-04

## 2022-09-02 RX ADMIN — BUDESONIDE 0.5 MG: 0.5 INHALANT ORAL at 07:58

## 2022-09-02 RX ADMIN — LEVOTHYROXINE SODIUM 100 MCG: 100 TABLET ORAL at 06:29

## 2022-09-02 RX ADMIN — LEVALBUTEROL HYDROCHLORIDE 1.25 MG: 1.25 SOLUTION, CONCENTRATE RESPIRATORY (INHALATION) at 07:58

## 2022-09-02 RX ADMIN — DOCUSATE SODIUM 100 MG: 100 CAPSULE, LIQUID FILLED ORAL at 08:36

## 2022-09-02 RX ADMIN — ENOXAPARIN SODIUM 40 MG: 40 INJECTION SUBCUTANEOUS at 08:36

## 2022-09-02 RX ADMIN — ISODIUM CHLORIDE 3 ML: 0.03 SOLUTION RESPIRATORY (INHALATION) at 07:58

## 2022-09-02 RX ADMIN — DULOXETINE 20 MG: 20 CAPSULE, DELAYED RELEASE ORAL at 08:36

## 2022-09-02 RX ADMIN — GUAIFENESIN 600 MG: 600 TABLET, EXTENDED RELEASE ORAL at 08:36

## 2022-09-02 RX ADMIN — LEVALBUTEROL HYDROCHLORIDE 1.25 MG: 1.25 SOLUTION, CONCENTRATE RESPIRATORY (INHALATION) at 13:43

## 2022-09-02 RX ADMIN — PANTOPRAZOLE SODIUM 40 MG: 40 TABLET, DELAYED RELEASE ORAL at 06:29

## 2022-09-02 RX ADMIN — Medication 12.5 MG: at 08:36

## 2022-09-02 RX ADMIN — ACETAMINOPHEN 650 MG: 325 TABLET ORAL at 12:25

## 2022-09-02 RX ADMIN — CEFTRIAXONE SODIUM 1000 MG: 10 INJECTION, POWDER, FOR SOLUTION INTRAVENOUS at 08:37

## 2022-09-02 RX ADMIN — ASPIRIN 81 MG: 81 TABLET, COATED ORAL at 08:36

## 2022-09-02 RX ADMIN — ISODIUM CHLORIDE 3 ML: 0.03 SOLUTION RESPIRATORY (INHALATION) at 13:43

## 2022-09-02 NOTE — CASE MANAGEMENT
Case Management Discharge Planning Note    Patient name Rome Pinedo  Location CW2 086/IO6 211-01 MRN [id number]  : 10/31/1929 Date 2022       Current Admission Date: 2022  Current Admission Diagnosis:Pneumonia   Patient Active Problem List    Diagnosis Date Noted    Underweight     Metabolic encephalopathy     Pneumonia 2022    Medicare annual wellness visit, subsequent 2022    Foot pain, bilateral 2022    Mild protein-calorie malnutrition ([facility] ) 2021    COPD, severity to be determined ([facility] ) 2021    Hypertension     Pulmonary fibrosis ([facility] ) 2018    Left upper lobe pneumonia 2018    Intermittent asthma 2018    Abdominal aortic aneurysm (AAA) 3 0 cm to 5 0 cm in diameter in female [facility]) 2018    Toxic metabolic encephalopathy 2648    Squamous cell carcinoma of right thigh 2014    Hypothyroidism 2012    Type 2 diabetes mellitus with hemoglobin A1c goal of less than 7 0% ([facility] ) 10/19/2011    Dyslipidemia, goal LDL below 70 2011    Mononeuritis of lower limb 2011    CAD (coronary artery disease), native coronary artery 2011    Dementia due to medical condition without behavioral disturbance ([facility] ) 2011    Peripheral vascular disease ([facility] ) 2011      LOS (days): 3  Geometric Mean LOS (GMLOS) (days): 4 10  Days to GMLOS:1     OBJECTIVE:  Risk of Unplanned Readmission Score: 14 45         Current admission status: Inpatient   Preferred Pharmacy:   [address], [address] [place]  [address] [facility] [place] [address]  Phone: [phone] Fax: Pauline Degroot [address] [address]  Phone: [phone] Fax: [facility], [address] [place] [address]  Phone: [phone] Fax: [phone]    Primary Care Provider: Henrene Libman, MD    Primary Insurance: MEDICARE  Secondary Insurance: [id number]    DISCHARGE DETAILS:    Discharge planning discussed with[de-identified] Patient and granddaughter Nika Brush at bedside              DME Referral Provided  Referral made for DME?: Yes  DME referral completed for the following items[de-identified] Portable Oxygen concentrator ([place])  DME Supplier Name[de-identified] [employer]    Other Referral/Resources/Interventions Provided:  Interventions: DME  Referral Comments: CM advised by RT that pt requires home O2 to be arranged prior to discharge  CM ordered POC (Jose device) via [place] and delivered at bedside (Serial #: [id number])  Signature received on delivery ticket  Contact information provided for [employer] with questions or concerns  Bedside RN informed  Treatment Team Recommendation: Home  Discharge Destination Plan[de-identified] Home                                IMM Given (Date):: 09/02/22  IMM Given to[de-identified] Family     Additional Comments: No therapy needs identified; Family will provide transportation home today  No concerns with discharge noted on behalf of family

## 2022-09-02 NOTE — RESPIRATORY THERAPY NOTE
Home Oxygen Qualifying Test     Patient name: Jacquie Gordon        : 10/31/1929   Date of Test:  2022  Diagnosis:    Home Oxygen Test:    **Medicare Guidelines require item(s) 1-5 on all ambulatory patients or 1 and 2 on non-ambulatory patients  1  Baseline SPO2 on Room Air at rest 86 %   a  If <= 88% on Room Air add O2 via NC to obtain SpO2 >=88%  If LPM needed, document LPM  needed to reach =>88%    2  SPO2 during exertion on Room Air  85%  a  During exertion monitor SPO2  If SPO2 increases >=89%, do not add supplemental oxygen    3  SPO2 on Oxygen at Rest 92 % at 2 LPM    4  SPO2 during exertion on Oxygen 91 % at 2 LPM    5  Test performed during exertion activity  [x]  Supplemental Home Oxygen is indicated  []  Client does not qualify for home oxygen      Respiratory Additional Notes- pt does not ambulate, pt was moved in bed and sat at the edge of bed, pt's SpO2 dropped overtime of being in RA at rest      Kris Mota, RT

## 2022-09-02 NOTE — DISCHARGE SUMMARY
INTERNAL MEDICINE RESIDENCY DISCHARGE SUMMARY     Dominique Heart   80 y o  female  MRN: [id number]  Room/Bed: [place] [address]/[facility] [address]   Encounter: [id number]    Principal Problem:    Pneumonia  Active Problems:    Type 2 diabetes mellitus with hemoglobin A1c goal of less than 7 0% (HCC)    CAD (coronary artery disease), native coronary artery    Intermittent asthma    Abdominal aortic aneurysm (AAA) 3 0 cm to 5 0 cm in diameter in female [facility])    Hypothyroidism    Dyslipidemia, goal LDL below 70    Pulmonary fibrosis (HCC)    Dementia due to medical condition without behavioral disturbance (HCC)    COPD, severity to be determined ([facility] )    Hypertension    Underweight    Metabolic encephalopathy      * Pneumonia  Assessment & Plan  Drip Score 2 (antibiotic use, COPD), Severe CAP score 2 (confusion, BUN)  Patient was started on cefepime and vancomycin in the ED  COVID negative, likely non severe CAP bacterial pneumonia    Plan:  · Will step-down patient to monotherapy with ceftriaxone per algorithm  · Will discontinue ceftriaxone and begin cefuroxime for 1 day starting 09/03 to complete treatment  · MRSA negative  · UA also drawn in ED, however believe this is more pneumonia than UTI  · Trend CBC, fever curve  · O2 N/C to maintain O2 sats above 88%  Patient requiring 2LNC  Will get home O2 eval for home oxygen  · Gentle hydration      Metabolic encephalopathy  Assessment & Plan  2/2 PNA and possible underlying UTI, as evidenced by confusion greater than baseline dementia, lethargy, and inability to follow commands, requiring treatment for underlying infection, monitoring of mental status and supportive care  · Continue treatment with antibiotics  · Patients mentation improving, back to baseline  Encephalopathy resolved       Underweight  Assessment & Plan  As evidenced by BMI 18 43, in the setting of relatively stable weight over past year, with family reporting poor PO intake over past week, requiring Mechanical soft regular diet, monitoring of PO intake and assistance with meals as needed         Findings:  BMI 18 43,  HT 5'0,  WT 94 lb 5 7 oz  04/19/2022:  94 lb6 4 oz  09/07/2021:  96 lb    Patient reports decreased appetite and productive cough approximately 1 week ago  Continue speech therapy for swallowing eval    Hypertension  Assessment & Plan  Continue metoprolol tartrate 12 5 mg b i d     COPD, severity to be determined [facility])  Assessment & Plan  Continue Singular and bronchodilators    Dementia due to medical condition without behavioral disturbance (HCC)  Assessment & Plan  Continue home regimen of alprazolam, donepezil, duloxetine, melatonin, gabapentin  Per family, patient is occasionally tried to get out of bed at night  Plan:  · Family has had to use soft restraints occasionally at home  · Will defer soft restraints at this time  · If attempts to redirect and virtual continuous observation fail, can consider soft restraints    Pulmonary fibrosis ([address] )  Assessment & Plan  Noted on imaging    Dyslipidemia, goal LDL below 70  Assessment & Plan  Continue atorvastatin    Hypothyroidism  Assessment & Plan  Continue levothyroxine    Abdominal aortic aneurysm (AAA) 3 0 cm to 5 0 cm in diameter in female [facility])  Assessment & Plan  5 cm infrarenal AAA  Plan:  · Outpatient monitor    Intermittent asthma  Assessment & Plan  Continue Singulair and bronchodilators    CAD (coronary artery disease), native coronary artery  Assessment & Plan  Continue aspirin, statin    Type 2 diabetes mellitus with hemoglobin A1c goal of less than 7 0% [facility])  Assessment & Plan  Lab Results   Component Value Date    HGBA1C 5 9 (H) 09/07/2021       No results for input(s): POCGLU in the last 72 hours  Blood Sugar Average: Last 72 hrs:       Diet controlled  Will defer insulin regimen at this time          [address]     Ms Randleell Divya is a 70-year-old female with past medical history of dementia, COPD, diabetes diet controlled, GERD, hypertension, hyperlipidemia  Patient presented to [facility] Emergency Department on 08/30 after her granddaughter and caregiver both noticed a change in mental status  Patient is bedbound and is only able perform self feeding in her brushing  Relies on her granddaughter, who was in and P, and healthcare aide to provide ADLs  Day before admission granddaughter nerves the patient was more lethargic than usual   Temperature was taken at that time noted to be 99 9° F  Overnight the patient was talking in her sleep more than usual   Morning of admission the patient was not following commands like she usually does  This concerned the granddaughter and the patient was brought to the ED  Of note patient recently treated on 06/22 for acute bronchitis with azithromycin  Chest x-ray done in ED admission was suggestive of pneumonia  Patient with elevated white blood cell count and procalcitonin count  Blood cultures showed contamination  Urinalysis was performed which was positive for infection  Patient was started on cefepime and vancomycin initially, the treatment was de-escalated to monotherapy with ceftriaxone  Patient improved with treatment of ceftriaxone and is stable for discharge on 09/02/2022  Patient required 2 L via nasal cannula and home O2 eval was performed  Patient was given prescription for oxygen at discharge only sent home with oxygen  Prescription for cefuroxime 500 mg b i d  X2 doses was sent to pharmacy to complete patient's course of antibiotics on 09/03/2022  Patient remained stable for discharge, will be discharged home today  Patient will require follow-up with PCP      DISCHARGE INFORMATION     PCP at Discharge: Bandar Crews MD    Admitting Provider: Maurice Sotelo MD  Admission Date: 8/30/2022    Discharge Provider: Maurice Sotelo MD  Discharge Date: 09/02/2022    Discharge Disposition: Non [facility] SNF/TCU/SNU  Discharge Condition: stable  Discharge with Lines: no    Discharge Diet: Modified dysphaiga  Pureed  Nectar thick liquids  Activity Restrictions: none  Test Results Pending at Discharge: None    Discharge Diagnoses:  Principal Problem:    Pneumonia  Active Problems:    Type 2 diabetes mellitus with hemoglobin A1c goal of less than 7 0% (HCC)    CAD (coronary artery disease), native coronary artery    Intermittent asthma    Abdominal aortic aneurysm (AAA) 3 0 cm to 5 0 cm in diameter in female [facility])    Hypothyroidism    Dyslipidemia, goal LDL below 70    Pulmonary fibrosis (HCC)    Dementia due to medical condition without behavioral disturbance ([facility])    COPD, severity to be determined ([facility] )    Hypertension    Underweight    Metabolic encephalopathy  Resolved Problems:    * No resolved hospital problems  *      Consulting Providers: None      Diagnostic & Therapeutic Procedures Performed:  XR chest portable - 1 view    Result Date: 8/30/2022  Impression: Left greater than right bibasilar opacity, question atelectasis and/or pneumonia, with underlying fibrosis   Workstation performed: [id number]       Code Status: Level 3 - DNAR and DNI  Advance Directive & Living Will: <no information>  Power of :    POLST:      Medications:  Current Discharge Medication List        Current Discharge Medication List      START taking these medications    Details   cefuroxime (CEFTIN) 500 mg tablet Take 1 tablet (500 mg total) by mouth every 12 (twelve) hours for 2 doses  Qty: 2 tablet, Refills: 0    Associated Diagnoses: Left upper lobe pneumonia           Current Discharge Medication List      CONTINUE these medications which have NOT CHANGED    Details   acetaminophen (TYLENOL) 650 mg CR tablet Take 650 mg by mouth every 8 (eight) hours as needed for mild pain      albuterol (2 5 mg/3 mL) 0 083 % nebulizer solution TAKE 3ML BY NEBULIZER EVERY 6 HOURS AS  NEEDED FOR WHEEZING OR SHORTNESS OR BREATH      ALPRAZolam (XANAX) 0 5 mg tablet Take 1 tablet (0 5 mg total) by mouth daily at bedtime as needed for anxiety  Qty: 30 tablet, Refills: 3    Associated Diagnoses: Anxiety      aspirin (ECOTRIN LOW STRENGTH) 81 mg EC tablet Take 1 tablet (81 mg total) by mouth daily  Qty: 90 tablet, Refills: 1    Associated Diagnoses: Primary hypertension      atorvastatin (LIPITOR) 10 mg tablet Take 1 tablet (10 mg total) by mouth daily at bedtime  Qty: 90 tablet, Refills: 1    Associated Diagnoses: Other hyperlipidemia      Docusate Sodium 100 MG capsule Take by mouth      donepezil (ARICEPT) 10 mg tablet Take 1 tablet (10 mg total) by mouth daily at bedtime  Qty: 90 tablet, Refills: 1    Associated Diagnoses: Dementia due to medical condition without behavioral disturbance (HCC)      DULoxetine (CYMBALTA) 20 mg capsule Take 1 capsule (20 mg total) by mouth daily  Qty: 90 capsule, Refills: 1    Associated Diagnoses: Dementia due to medical condition without behavioral disturbance (HCC)      Incontinence Supplies MISC Pt uses up to 3 packs of 100 wipes per week  Qty: 4 each, Refills: 5    Comments: [employer] disposable wash cloths with aloe  Pt gets 6 packs per box of 100 wipes  Associated Diagnoses: Urinary incontinence, unspecified type      !! Incontinence Supply Disposable (Incontinence Brief Medium) MISC Pt uses 5 pullups daily as needed  Qty: 200 each, Refills: 5    Associated Diagnoses: Urinary incontinence, unspecified type      !! Incontinence Supply Disposable (Poise Maximum Absorbency) PADS Use 2 (two) times a day Size 6 long  Qty: 300 each, Refills: 5    Associated Diagnoses: Urinary incontinence, unspecified type      !!  Incontinence Supply Disposable MISC Pt uses 3 at night as needed  Qty: 100 each, Refills: 5    Comments: Night time diaper size Medium  Associated Diagnoses: Urinary incontinence, unspecified type      levothyroxine 100 mcg tablet Take 1 tablet (100 mcg total) by mouth daily  Qty: 90 tablet, Refills: 1    Associated Diagnoses: Other specified hypothyroidism      melatonin 1 mg Take 1 mg by mouth daily at bedtime      metoprolol tartrate (LOPRESSOR) 25 mg tablet Take 0 5 tablets (12 5 mg total) by mouth 2 (two) times a day  Qty: 90 tablet, Refills: 1    Associated Diagnoses: Primary hypertension      montelukast (SINGULAIR) 10 mg tablet Take 1 tablet (10 mg total) by mouth daily at bedtime  Qty: 90 tablet, Refills: 1    Associated Diagnoses: COPD, severity to be determined (HCC)      omeprazole (PriLOSEC) 20 mg delayed release capsule Take 1 capsule (20 mg total) by mouth daily  Qty: 90 capsule, Refills: 1    Associated Diagnoses: Other hyperlipidemia      dimethicone 1 % cream Apply topically 2 (two) times a day as needed for dry skin  Qty: 113 g, Refills: 2    Associated Diagnoses: Urinary incontinence, unspecified type      Gauze Pads & Dressings (AMD Foam Dressing) 4"X4" PADS Use 2 (two) times a day  Qty: 40 each, Refills: 5    Comments: Pt gets box 10 for 1 month supply of Hydrocellular foam dressing  Associated Diagnoses: Urinary incontinence, unspecified type      guaiFENesin (MUCINEX) 600 mg 12 hr tablet Take 1 tablet (600 mg total) by mouth every 12 (twelve) hours  Qty: 30 tablet, Refills: 1    Associated Diagnoses: Acute bronchitis due to other specified organisms      nitroglycerin (NITROSTAT) 0 4 mg SL tablet Place 0 4 mg under the tongue every 5 (five) minutes as needed for chest pain       !! - Potential duplicate medications found  Please discuss with provider  Allergies:  No Known Allergies    FOLLOW-UP     PCP Outpatient Follow-up:  yes      Follow up: Rebecca Izaguirre MD  Follow up within next: 1-2 weeks of discharge    Consulting Providers Follow-up:  none required     Active Issues Requiring Follow-up:   yes     Issue: Pneumonia  Responsible Individual: PCP  What is Needed: Complete antibiotics course  Prescription sent to pharmacy     Follow-up Appointments Arranged: Yes       Discharge Statement:   I spent 45 minutes minutes discharging the patient  This time was spent on the day of discharge  I had direct contact with the patient on the day of discharge  Additional documentation is required if more than 30 minutes were spent on discharge  Portions of the record may have been created with voice recognition software  Occasional wrong word or "sound a like" substitutions may have occurred due to the inherent limitations of voice recognition software    Read the chart carefully and recognize, using context, where substitutions have occurred     ==  Jennifer Guillaume, [address] [place] [address]  Internal Medicine Resident PGY-2

## 2022-09-02 NOTE — NURSING NOTE
DC instructions given to and explained to patient and family with verbal understanding  CM delivered portable oxygen  Extra NC supplied to family

## 2022-09-02 NOTE — DISCHARGE INSTRUCTIONS
Please call your PCP's office within 1-2 days of discharge to make a follow-up appointment  A prescription for an antibiotic, cefuroxime, has been sent to your pharmacy  Please take cefuroxime 1 tablet (500 mg total) by mouth every 12 hours for 2 doses  This should begin and end on 09/03/2022  This will complete your antibiotic course for pneumonia  A change to the way you take your gabapentin has been made while in the hospital  Please take gabapentin 1 capsule (100 mg total) by mouth daily at bedtime  You were evaluated by a speech therapist and dietary modifications have been suggested  A modified/dysphagia diet has been recommended including pureed foods and nectar thick liquid

## 2022-09-02 NOTE — PROGRESS NOTES
INTERNAL MEDICINE RESIDENCY PROGRESS NOTE     Name: Shanon Burkett   Age & Sex: 80 y o  female   MRN: [id number]  Unit/Bed#: CW2 211-01   Encounter: [id number]  Team: SOD Team B     PATIENT INFORMATION     Name: Shanon Burkett   Age & Sex: 80 y o  female   MRN: [id number]  Hospital Stay Days: 3    ASSESSMENT/PLAN     Principal Problem:    Pneumonia  Active Problems:    Type 2 diabetes mellitus with hemoglobin A1c goal of less than 7 0% (HCC)    CAD (coronary artery disease), native coronary artery    Intermittent asthma    Abdominal aortic aneurysm (AAA) 3 0 cm to 5 0 cm in diameter in female [facility])    Hypothyroidism    Dyslipidemia, goal LDL below 70    Pulmonary fibrosis (HCC)    Dementia due to medical condition without behavioral disturbance ([facility])    COPD, severity to be determined ([facility] [address] )    Hypertension    Underweight    Metabolic encephalopathy      * Pneumonia  Assessment & Plan  Drip Score 2 (antibiotic use, COPD), Severe CAP score 2 (confusion, BUN)  Patient was started on cefepime and vancomycin in the ED  COVID negative, likely non severe CAP bacterial pneumonia    Plan:  · Will step-down patient to monotherapy with ceftriaxone per algorithm  · Will discontinue ceftriaxone and begin cefuroxime for 1 day starting 09/03 to complete treatment  · MRSA negative  · UA also drawn in ED, however believe this is more pneumonia than UTI  · Trend CBC, fever curve  · O2 N/C to maintain O2 sats above 88%  Patient requiring 2LNC  Will get home O2 eval for home oxygen  · Gentle hydration      Metabolic encephalopathy  Assessment & Plan  2/2 PNA and possible underlying UTI, as evidenced by confusion greater than baseline dementia, lethargy, and inability to follow commands, requiring treatment for underlying infection, monitoring of mental status and supportive care  · Continue treatment with antibiotics  · Patients mentation improving, back to baseline  Encephalopathy resolved       Underweight  Assessment & Plan  As evidenced by BMI 18 43, in the setting of relatively stable weight over past year, with family reporting poor PO intake over past week, requiring Mechanical soft regular diet, monitoring of PO intake and assistance with meals as needed         Findings:  BMI 18 43,  HT 5'0,  WT 94 lb 5 7 oz  04/19/2022:  94 lb6 4 oz  09/07/2021:  96 lb    Patient reports decreased appetite and productive cough approximately 1 week ago  Continue speech therapy for swallowing eval    Hypertension  Assessment & Plan  Continue metoprolol tartrate 12 5 mg b i d     COPD, severity to be determined [facility])  Assessment & Plan  Continue Singular and bronchodilators    Dementia due to medical condition without behavioral disturbance (HCC)  Assessment & Plan  Continue home regimen of alprazolam, donepezil, duloxetine, melatonin, gabapentin  Per family, patient is occasionally tried to get out of bed at night  Plan:  · Family has had to use soft restraints occasionally at home  · Will defer soft restraints at this time  · If attempts to redirect and virtual continuous observation fail, can consider soft restraints    Pulmonary fibrosis ([facility] [address] )  Assessment & Plan  Noted on imaging    Dyslipidemia, goal LDL below 70  Assessment & Plan  Continue atorvastatin    Hypothyroidism  Assessment & Plan  Continue levothyroxine    Abdominal aortic aneurysm (AAA) 3 0 cm to 5 0 cm in diameter in female [facility])  Assessment & Plan  5 cm infrarenal AAA  Plan:  · Outpatient monitor    Intermittent asthma  Assessment & Plan  Continue Singulair and bronchodilators    CAD (coronary artery disease), native coronary artery  Assessment & Plan  Continue aspirin, statin    Type 2 diabetes mellitus with hemoglobin A1c goal of less than 7 0% [facility])  Assessment & Plan  Lab Results   Component Value Date    HGBA1C 5 9 (H) 09/07/2021       No results for input(s): POCGLU in the last 72 hours  Blood Sugar Average: Last 72 hrs:       Diet controlled    Will defer insulin regimen at this time  Disposition: Potential discharge tomorrow  Patient should be discharged on home O2 due to drop in SpO2 on RA  Need case management to arrange home O2 therapy  SUBJECTIVE     Patient seen and examined  No acute events overnight  Resting comfortably in bed on 2 5 L NC  Denies any complaints  Does not appear to be in acute distress  OBJECTIVE     Vitals:    22 1900 22 2100 22 2331 22 0739   BP:   152/82    BP Location:       Pulse:   75 94   Resp:   16    Temp:   97 5 °F (36 4 °C) (!) 97 2 °F (36 2 °C)   TempSrc:       SpO2: 98% 95% 99% 98%   Weight:          Temperature:   Temp (24hrs), Av 4 °F (36 3 °C), Min:97 2 °F (36 2 °C), Max:97 5 °F (36 4 °C)    Temperature: (!) 97 2 °F (36 2 °C)  Intake & Output:  I/O        07 0700  0701   0700  0701   0700    P  O  240 240     IV Piggyback       Total Intake(mL/kg) 240 (5 6) 240 (5 6)     Urine (mL/kg/hr)  1180 (1 1)     Total Output  1180     Net +240 -940            Unmeasured Urine Occurrence 5 x          Weights:        Body mass index is 18 43 kg/m²  Weight (last 2 days)     None        Physical Exam  Constitutional:       Appearance: Normal appearance  HENT:      Head: Normocephalic and atraumatic  Right Ear: External ear normal       Left Ear: External ear normal       Nose: Nose normal    Eyes:      General: No scleral icterus  Extraocular Movements: Extraocular movements intact  Cardiovascular:      Rate and Rhythm: Normal rate and regular rhythm  Pulses: Normal pulses  Heart sounds: Normal heart sounds  Pulmonary:      Effort: Pulmonary effort is normal       Breath sounds: Normal breath sounds  Abdominal:      General: Bowel sounds are normal       Palpations: Abdomen is soft  Tenderness: There is no abdominal tenderness  There is no guarding or rebound  Musculoskeletal:      Right lower leg: No edema        Left lower leg: No edema  Skin:     General: Skin is warm and dry  Neurological:      Mental Status: Mental status is at baseline  LABORATORY DATA     Labs: I have personally reviewed pertinent reports  Results from last 7 days   Lab Units 09/01/22  0700 08/31/22  0505 08/30/22  1226   WBC Thousand/uL 13 49* 22 92* 34 33*   HEMOGLOBIN g/dL 12 0 11 3* 11 6   HEMATOCRIT % 40 8 41 6 38 6   PLATELETS Thousands/uL 303 237 335   NEUTROS PCT % 76* 82*  --    MONOS PCT % 6 4  --    MONO PCT %  --   --  3*      Results from last 7 days   Lab Units 09/01/22  0700 08/31/22  0543 08/30/22  1226   POTASSIUM mmol/L 4 3 4 5 4 3   CHLORIDE mmol/L 103 105 105   CO2 mmol/L 32 29 30   BUN mg/dL 18 22 20   CREATININE mg/dL 0 47* 0 55* 0 54*   CALCIUM mg/dL 8 9 8 9 8 6   ALK PHOS U/L  --  135* 153*   ALT U/L  --  12 14   AST U/L  --  15 19     Results from last 7 days   Lab Units 08/31/22  0543   MAGNESIUM mg/dL 2 2     Results from last 7 days   Lab Units 08/31/22  0543   PHOSPHORUS mg/dL 4 5*      Results from last 7 days   Lab Units 08/30/22  1226   INR  1 04   PTT seconds 31     Results from last 7 days   Lab Units 08/30/22  1226   LACTIC ACID mmol/L 1 2           IMAGING & DIAGNOSTIC TESTING     Radiology Results: I have personally reviewed pertinent reports  XR chest portable - 1 view    Result Date: 8/30/2022  Impression: Left greater than right bibasilar opacity, question atelectasis and/or pneumonia, with underlying fibrosis  Workstation performed: [id number]     Other Diagnostic Testing: I have personally reviewed pertinent reports      ACTIVE MEDICATIONS     Current Facility-Administered Medications   Medication Dose Route Frequency    acetaminophen (TYLENOL) tablet 650 mg  650 mg Oral Q6H PRN    albuterol inhalation solution 2 5 mg  2 5 mg Nebulization Q4H PRN    ALPRAZolam (XANAX) tablet 0 5 mg  0 5 mg Oral HS    aspirin (ECOTRIN LOW STRENGTH) EC tablet 81 mg  81 mg Oral Daily    atorvastatin (LIPITOR) tablet 10 mg  10 mg Oral HS    budesonide (PULMICORT) inhalation solution 0 5 mg  0 5 mg Nebulization Q12H    [START ON 9/3/2022] cefuroxime (CEFTIN) tablet 500 mg  500 mg Oral Q12H [address]    docusate sodium (COLACE) capsule 100 mg  100 mg Oral Daily    donepezil (ARICEPT) tablet 10 mg  10 mg Oral HS    DULoxetine (CYMBALTA) delayed release capsule 20 mg  20 mg Oral Daily    enoxaparin (LOVENOX) subcutaneous injection 40 mg  40 mg Subcutaneous Daily    gabapentin (NEURONTIN) capsule 100 mg  100 mg Oral HS    guaiFENesin (MUCINEX) 12 hr tablet 600 mg  600 mg Oral Q12H CHERYL    levalbuterol (XOPENEX) inhalation solution 1 25 mg  1 25 mg Nebulization TID    levothyroxine tablet 100 mcg  100 mcg Oral Early Morning    melatonin tablet 1 5 mg  1 5 mg Oral HS    metoprolol tartrate (LOPRESSOR) partial tablet 12 5 mg  12 5 mg Oral BID    montelukast (SINGULAIR) tablet 10 mg  10 mg Oral HS    pantoprazole (PROTONIX) EC tablet 40 mg  40 mg Oral Early Morning    sodium chloride 0 9 % inhalation solution 3 mL  3 mL Nebulization TID       VTE Pharmacologic Prophylaxis: Enoxaparin (Lovenox)  VTE Mechanical Prophylaxis: sequential compression device    Portions of the record may have been created with voice recognition software  Occasional wrong word or "sound a like" substitutions may have occurred due to the inherent limitations of voice recognition software    Read the chart carefully and recognize, using context, where substitutions have occurred   ==  Abeba Lazar, DO  [address]  Internal Medicine Residency PGY-2

## 2022-09-02 NOTE — SPEECH THERAPY NOTE
Speech Language/Pathology    Speech/Language Pathology Progress Note    Patient Name: Rae Crawford  Today's Date: 9/2/2022     Problem List  Principal Problem:    Pneumonia  Active Problems:    Type 2 diabetes mellitus with hemoglobin A1c goal of less than 7 0% ([facility])    CAD (coronary artery disease), native coronary artery    Intermittent asthma    Abdominal aortic aneurysm (AAA) 3 0 cm to 5 0 cm in diameter in female [facility])    Hypothyroidism    Dyslipidemia, goal LDL below 70    Pulmonary fibrosis ([facility])    Dementia due to medical condition without behavioral disturbance ([facility])    COPD, severity to be determined ([facility] )    Hypertension    Underweight    Metabolic encephalopathy       Past Medical History  Past Medical History:   Diagnosis Date    Bell's palsy remote    Coronary artery disease     Diabetes mellitus ([facility] )     Disease of thyroid gland     GERD (gastroesophageal reflux disease)     Hip fracture ([facility])     Hyperlipidemia     Hypertension     Psychiatric disorder     Stroke [facility])         Past Surgical History  History reviewed  No pertinent surgical history  Subjective:  "Thank you" Patient is awake and alert today  Objective:  Family is present at bedside  Patient is seen at lunch meal for dysphagia therapy  She is able to feed herself today with assistance for tray set up  The patient is assessed with puree solids and nectar thick liquids  Bite size can be large, but family provides tactile cues to limit amount  Retrieval of bolus is adequate, with timely transfer  Patient takes nectar thick liquids via sippy cup with straw and spout sips  She has some decreased strength for retrieval, but appears to have more timely transfer today  Overall swallow appears more timely and coordinated today  No coughing observed during meal  Patient on O2 trial and is now on RA  O2 ranges from 88-90% during meal      Assessment:  The patient is much more awake and alert today   She is tolerating puree diet and nectar thick liquids well  Plan/Recommendations:  Continue current diet  ST will provide information packet to family re: diet prep and aspiration precautions for at home  May consider home care SLP

## 2022-09-04 LAB — BACTERIA BLD CULT: NORMAL

## 2022-09-07 ENCOUNTER — TRANSITIONAL CARE MANAGEMENT (OUTPATIENT)
Dept: FAMILY MEDICINE CLINIC | Facility: CLINIC | Age: 87
End: 2022-09-07

## 2022-09-07 ENCOUNTER — TELEPHONE (OUTPATIENT)
Dept: FAMILY MEDICINE CLINIC | Facility: CLINIC | Age: 87
End: 2022-09-07

## 2022-09-08 LAB
DME PARACHUTE DELIVERY DATE ACTUAL: NORMAL
DME PARACHUTE DELIVERY DATE REQUESTED: NORMAL
DME PARACHUTE ITEM DESCRIPTION: NORMAL
DME PARACHUTE ORDER STATUS: NORMAL
DME PARACHUTE SUPPLIER NAME: NORMAL
DME PARACHUTE SUPPLIER PHONE: NORMAL

## 2022-09-12 ENCOUNTER — RA CDI HCC (OUTPATIENT)
Dept: OTHER | Facility: HOSPITAL | Age: 87
End: 2022-09-12

## 2022-09-12 NOTE — PROGRESS NOTES
[facility]  coding opportunities     E11 51, J84 10 and I70 203     Chart Reviewed number of suggestions sent to Provider: 3     Patients Insurance     Medicare Insurance: Medicare

## 2022-09-16 ENCOUNTER — OFFICE VISIT (OUTPATIENT)
Dept: FAMILY MEDICINE CLINIC | Facility: CLINIC | Age: 87
End: 2022-09-16
Payer: MEDICARE

## 2022-09-16 VITALS
TEMPERATURE: 97.7 F | SYSTOLIC BLOOD PRESSURE: 118 MMHG | WEIGHT: 93 LBS | HEART RATE: 87 BPM | OXYGEN SATURATION: 99 % | BODY MASS INDEX: 18.16 KG/M2 | DIASTOLIC BLOOD PRESSURE: 70 MMHG

## 2022-09-16 DIAGNOSIS — R73.9 HYPERGLYCEMIA: ICD-10-CM

## 2022-09-16 DIAGNOSIS — E11.9 TYPE 2 DIABETES MELLITUS WITH HEMOGLOBIN A1C GOAL OF LESS THAN 7.0% (HCC): ICD-10-CM

## 2022-09-16 DIAGNOSIS — Z23 IMMUNIZATION DUE: ICD-10-CM

## 2022-09-16 DIAGNOSIS — I71.40 ABDOMINAL AORTIC ANEURYSM (AAA) 3.0 CM TO 5.0 CM IN DIAMETER IN FEMALE: ICD-10-CM

## 2022-09-16 DIAGNOSIS — E78.49 OTHER HYPERLIPIDEMIA: ICD-10-CM

## 2022-09-16 DIAGNOSIS — N30.00 ACUTE CYSTITIS WITHOUT HEMATURIA: ICD-10-CM

## 2022-09-16 DIAGNOSIS — E03.8 OTHER SPECIFIED HYPOTHYROIDISM: ICD-10-CM

## 2022-09-16 DIAGNOSIS — E44.1 MILD PROTEIN-CALORIE MALNUTRITION (HCC): ICD-10-CM

## 2022-09-16 DIAGNOSIS — J18.9 PNEUMONIA OF LEFT UPPER LOBE DUE TO INFECTIOUS ORGANISM: Primary | ICD-10-CM

## 2022-09-16 DIAGNOSIS — I10 PRIMARY HYPERTENSION: ICD-10-CM

## 2022-09-16 PROCEDURE — 99495 TRANSJ CARE MGMT MOD F2F 14D: CPT | Performed by: FAMILY MEDICINE

## 2022-09-16 PROCEDURE — 90662 IIV NO PRSV INCREASED AG IM: CPT

## 2022-09-16 PROCEDURE — G0008 ADMIN INFLUENZA VIRUS VAC: HCPCS

## 2022-09-16 RX ORDER — NIACINAMIDE, ADENOSINE 1; .02 G/50ML; G/50ML
CREAM TOPICAL DAILY
Qty: 1020 G | Refills: 0 | Status: SHIPPED | OUTPATIENT
Start: 2022-09-16 | End: 2022-10-16

## 2022-09-16 NOTE — ASSESSMENT & PLAN NOTE
Lab Results   Component Value Date    HGBA1C 5 9 (H) 09/07/2021   Well controlled  Continue to monitor  I will check her A1c

## 2022-09-16 NOTE — PROGRESS NOTES
Assessment & Plan     1  Pneumonia of left upper lobe due to infectious organism  Assessment & Plan:  Symptoms improved  I am going to repeat chest x-ray and white count for follow-up  Orders:  -     XR chest pa & lateral; Future; Expected date: 09/16/2022    2  Other hyperlipidemia  -     CBC and differential; Future  -     Comprehensive metabolic panel; Future  -     TSH, 3rd generation with Free T4 reflex; Future    3  Primary hypertension  -     CBC and differential; Future  -     Comprehensive metabolic panel; Future  -     TSH, 3rd generation with Free T4 reflex; Future    4  Hyperglycemia  -     Hemoglobin A1C; Future    5  Immunization due  -     influenza vaccine, high-dose, PF 0 7 mL (FLUZONE HIGH-DOSE)    6  Mild protein-calorie malnutrition ([address] )  -     STARCH-MALTO DEXTRIN (Thick-It) POWD; Take by mouth in the morning    7  Other specified hypothyroidism  Assessment & Plan:  Stable  Continue same  I am going to check TSH  8  Abdominal aortic aneurysm (AAA) 3 0 cm to 5 0 cm in diameter in female [facility])  Assessment & Plan:  Continue to monitor  9  Type 2 diabetes mellitus with hemoglobin A1c goal of less than 7 0% [facility])  Assessment & Plan:    Lab Results   Component Value Date    HGBA1C 5 9 (H) 09/07/2021   Well controlled  Continue to monitor  I will check her A1c       10  Acute cystitis without hematuria  Assessment & Plan:  Improved  Will continue to monitor  Subjective     Transitional Care Management Review: Tutu Wayne is a 80 y o  female here for TCM follow up       During the TCM phone call patient stated:  TCM Call     Date and time call was made  9/7/2022  7:19 AM    Hospital care reviewed  Records reviewed    Patient was hospitialized at  [facility]    Date of Admission  08/30/22    Date of discharge  09/02/22    Diagnosis  Pneumonia    Disposition  Home    Were the patients medications reviewed and updated  No      TCM Call     Did you obtain your prescribed medications  Yes    Is transportation to your appointment needed  Yes    I have advised the patient to call PCP with any new or worsening symptoms  Melanie Barraza QL    Are you recieving any outpatient services  No    Are you recieving home care services  No    Are you using any community resources  No    Current waiver services  No    Have you fallen in the last 12 months  No    Interperter language line needed  No        Follow-up post hospital for change in mental status  Patient is bedbound relies on her granddaughter, who is a NP, and healthcare aide to provide ADLs  Day before admission granddaughter observed the patient was more lethargic than usual   Temperature was taken at that time noted to be 99 9° F  Overnight the patient was talking in her sleep more than usual   Morning of admission the patient was not following commands like she usually does  This concerned the granddaughter and the patient was brought to the ED  Chest x-ray done in ED admission was suggestive of pneumonia  Patient with elevated white blood cell count and procalcitonin count  Blood cultures showed contamination  Urinalysis was performed which was positive for infection  Patient was started on cefepime and vancomycin initially, the treatment was de-escalated to monotherapy with ceftriaxone  Patient improved with treatment of ceftriaxone and is stable for discharge on 09/02/2022  Patient required 2 L via nasal cannula and home O2 eval was performed  Patient was given prescription for oxygen at discharge only sent home with oxygen  Prescription for cefuroxime 500 mg b i d  X2 doses was sent to pharmacy to complete patient's course of antibiotics on 09/03/2022  Patient was discharged home in stable condition with O2  Granddaughter stated that the patient is doing well and requested prescription for thick it due to problem with swallowing  Review of Systems   Constitutional: Negative for chills and fever  HENT: Positive for trouble swallowing  Respiratory: Negative for cough and shortness of breath  Cardiovascular: Negative for chest pain, palpitations and leg swelling  Skin: Negative for rash  Neurological: Negative for seizures and headaches  Hematological: Negative for adenopathy  Objective     /70 (BP Location: Right arm, Patient Position: Sitting, Cuff Size: Adult)   Pulse 87   Temp 97 7 °F (36 5 °C) (Tympanic)   Wt 42 2 kg (93 lb) Comment: per family  SpO2 99% Comment: on 2 lt nasal  BMI 18 16 kg/m²      Physical Exam  Vitals reviewed  HENT:      Head: Normocephalic and atraumatic  Mouth/Throat:      Pharynx: Oropharynx is clear  Eyes:      Conjunctiva/sclera: Conjunctivae normal    Cardiovascular:      Rate and Rhythm: Normal rate and regular rhythm  Pulmonary:      Effort: Pulmonary effort is normal  No respiratory distress  Breath sounds: Normal breath sounds  Musculoskeletal:         General: Normal range of motion  Right lower leg: No edema  Left lower leg: No edema  Skin:     Findings: No rash  Neurological:      Mental Status: She is alert  Mental status is at baseline     Psychiatric:         Mood and Affect: Mood normal        Marilee Duggan MD

## 2022-09-19 ENCOUNTER — ONCOLOGY SURVIVORSHIP (OUTPATIENT)
Dept: FAMILY MEDICINE CLINIC | Facility: CLINIC | Age: 87
End: 2022-09-19

## 2022-09-19 DIAGNOSIS — J44.9 CHRONIC OBSTRUCTIVE PULMONARY DISEASE, UNSPECIFIED COPD TYPE (HCC): Primary | ICD-10-CM

## 2022-09-26 ENCOUNTER — APPOINTMENT (OUTPATIENT)
Dept: LAB | Age: 87
End: 2022-09-26
Payer: MEDICARE

## 2022-09-26 DIAGNOSIS — E78.49 OTHER HYPERLIPIDEMIA: ICD-10-CM

## 2022-09-26 DIAGNOSIS — R73.9 HYPERGLYCEMIA: ICD-10-CM

## 2022-09-26 DIAGNOSIS — I10 PRIMARY HYPERTENSION: ICD-10-CM

## 2022-09-26 LAB
ALBUMIN SERPL BCP-MCNC: 2.3 G/DL (ref 3.5–5)
ALP SERPL-CCNC: 164 U/L (ref 46–116)
ALT SERPL W P-5'-P-CCNC: 20 U/L (ref 12–78)
ANION GAP SERPL CALCULATED.3IONS-SCNC: 3 MMOL/L (ref 4–13)
AST SERPL W P-5'-P-CCNC: 21 U/L (ref 5–45)
BASOPHILS # BLD AUTO: 0.04 THOUSANDS/ΜL (ref 0–0.1)
BASOPHILS NFR BLD AUTO: 0 % (ref 0–1)
BILIRUB SERPL-MCNC: 0.27 MG/DL (ref 0.2–1)
BUN SERPL-MCNC: 17 MG/DL (ref 5–25)
CALCIUM ALBUM COR SERPL-MCNC: 10.1 MG/DL (ref 8.3–10.1)
CALCIUM SERPL-MCNC: 8.7 MG/DL (ref 8.3–10.1)
CHLORIDE SERPL-SCNC: 103 MMOL/L (ref 96–108)
CO2 SERPL-SCNC: 33 MMOL/L (ref 21–32)
CREAT SERPL-MCNC: 0.4 MG/DL (ref 0.6–1.3)
EOSINOPHIL # BLD AUTO: 0.33 THOUSAND/ΜL (ref 0–0.61)
EOSINOPHIL NFR BLD AUTO: 3 % (ref 0–6)
ERYTHROCYTE [DISTWIDTH] IN BLOOD BY AUTOMATED COUNT: 15.2 % (ref 11.6–15.1)
EST. AVERAGE GLUCOSE BLD GHB EST-MCNC: 117 MG/DL
GFR SERPL CREATININE-BSD FRML MDRD: 90 ML/MIN/1.73SQ M
GLUCOSE P FAST SERPL-MCNC: 79 MG/DL (ref 65–99)
HBA1C MFR BLD: 5.7 %
HCT VFR BLD AUTO: 35.7 % (ref 34.8–46.1)
HGB BLD-MCNC: 10.4 G/DL (ref 11.5–15.4)
IMM GRANULOCYTES # BLD AUTO: 0.05 THOUSAND/UL (ref 0–0.2)
IMM GRANULOCYTES NFR BLD AUTO: 1 % (ref 0–2)
LYMPHOCYTES # BLD AUTO: 1.88 THOUSANDS/ΜL (ref 0.6–4.47)
LYMPHOCYTES NFR BLD AUTO: 17 % (ref 14–44)
MCH RBC QN AUTO: 26.9 PG (ref 26.8–34.3)
MCHC RBC AUTO-ENTMCNC: 29.1 G/DL (ref 31.4–37.4)
MCV RBC AUTO: 92 FL (ref 82–98)
MONOCYTES # BLD AUTO: 0.62 THOUSAND/ΜL (ref 0.17–1.22)
MONOCYTES NFR BLD AUTO: 6 % (ref 4–12)
NEUTROPHILS # BLD AUTO: 8.07 THOUSANDS/ΜL (ref 1.85–7.62)
NEUTS SEG NFR BLD AUTO: 73 % (ref 43–75)
NRBC BLD AUTO-RTO: 0 /100 WBCS
PLATELET # BLD AUTO: 290 THOUSANDS/UL (ref 149–390)
PMV BLD AUTO: 9.8 FL (ref 8.9–12.7)
POTASSIUM SERPL-SCNC: 3.9 MMOL/L (ref 3.5–5.3)
PROT SERPL-MCNC: 6.3 G/DL (ref 6.4–8.4)
RBC # BLD AUTO: 3.87 MILLION/UL (ref 3.81–5.12)
SODIUM SERPL-SCNC: 139 MMOL/L (ref 135–147)
T4 FREE SERPL-MCNC: 1.46 NG/DL (ref 0.76–1.46)
TSH SERPL DL<=0.05 MIU/L-ACNC: 0.06 UIU/ML (ref 0.45–4.5)
WBC # BLD AUTO: 10.99 THOUSAND/UL (ref 4.31–10.16)

## 2022-09-26 PROCEDURE — 80053 COMPREHEN METABOLIC PANEL: CPT

## 2022-09-26 PROCEDURE — 84439 ASSAY OF FREE THYROXINE: CPT

## 2022-09-26 PROCEDURE — 36415 COLL VENOUS BLD VENIPUNCTURE: CPT

## 2022-09-26 PROCEDURE — 84443 ASSAY THYROID STIM HORMONE: CPT

## 2022-09-26 PROCEDURE — 83036 HEMOGLOBIN GLYCOSYLATED A1C: CPT

## 2022-09-26 PROCEDURE — 85025 COMPLETE CBC W/AUTO DIFF WBC: CPT

## 2022-09-27 ENCOUNTER — TELEPHONE (OUTPATIENT)
Dept: FAMILY MEDICINE CLINIC | Facility: CLINIC | Age: 87
End: 2022-09-27

## 2022-09-27 DIAGNOSIS — E61.1 LOW IRON: Primary | ICD-10-CM

## 2022-09-27 DIAGNOSIS — E03.8 OTHER SPECIFIED HYPOTHYROIDISM: ICD-10-CM

## 2022-09-27 NOTE — TELEPHONE ENCOUNTER
----- Message from Lulu Aguilar MD sent at 9/27/2022  1:44 PM EDT -----  Her TSH came back low  I decreased her levothyroxine to 88 mcg daily  I sent prescription  I recommend repeat TSH with free T4 in 2 months  Also her hemoglobin came back low  I recommend repeat CBC with iron panel in 2 months

## 2022-09-30 DIAGNOSIS — F51.01 PRIMARY INSOMNIA: Primary | ICD-10-CM

## 2022-09-30 RX ORDER — UREA 10 %
LOTION (ML) TOPICAL
Qty: 90 TABLET | Refills: 3 | Status: SHIPPED | OUTPATIENT
Start: 2022-09-30 | End: 2022-10-03 | Stop reason: SDUPTHER

## 2022-10-03 DIAGNOSIS — F51.01 PRIMARY INSOMNIA: ICD-10-CM

## 2022-10-03 RX ORDER — UREA 10 %
1 LOTION (ML) TOPICAL
Qty: 90 TABLET | Refills: 1 | Status: SHIPPED | OUTPATIENT
Start: 2022-10-03 | End: 2022-10-18

## 2022-10-07 PROBLEM — J96.21 ACUTE ON CHRONIC RESPIRATORY FAILURE WITH HYPOXIA (HCC): Status: ACTIVE | Noted: 2022-10-07

## 2022-10-07 PROBLEM — E44.1 MILD PROTEIN-CALORIE MALNUTRITION (HCC): Status: RESOLVED | Noted: 2021-09-07 | Resolved: 2022-10-07

## 2022-10-07 NOTE — ED PROVIDER NOTES
History  Chief Complaint   Patient presents with   • Respiratory Distress     Per EMS, pt was SOB earlier today along with a cough  Pt was found with sat's in the 70's  Pt started presenting this way around 1800  Rhea Veras is a 80-year-old woman with medical history significant for COPD, pulmonary fibrosis, diabetes, hypertension, hyperlipidemia, prior stroke, presenting with an episode of hypoxia at home, cough, increased oral secretions presenting with her daughter who provides the history  Her daughter noted increased oral secretions, cough, congestion throughout the day today  EMS was called this evening due to increasing respiratory distress  EMS found her to be 70% SpO2, suctioned her restfully, and placed on a non-rebreather  Her oxygen saturations improved into the 80s after suctioning with non-rebreather  Daughter reports that she has had no fevers, chills, nausea, vomiting  Daughter who is at bedside reports that her mother is not at her baseline mental status  She is more confused than usual   She reports that her mother was in her normal state of health yesterday  Prior to Admission Medications   Prescriptions Last Dose Informant Patient Reported? Taking?    ALPRAZolam (XANAX) 0 5 mg tablet   No No   Sig: Take 1 tablet (0 5 mg total) by mouth daily at bedtime as needed for anxiety   DULoxetine (CYMBALTA) 20 mg capsule   No No   Sig: Take 1 capsule (20 mg total) by mouth daily   Docusate Sodium 100 MG capsule   Yes No   Sig: Take by mouth   Gauze Pads & Dressings (AMD Foam Dressing) 4"X4" PADS   No No   Sig: Use 2 (two) times a day   Incontinence Supplies MISC   No No   Sig: Pt uses up to 3 packs of 100 wipes per week   Patient not taking: Reported on 9/16/2022   Incontinence Supply Disposable (Incontinence Brief Medium) MISC   No No   Sig: Pt uses 5 pullups daily as needed   Patient not taking: Reported on 9/16/2022   Incontinence Supply Disposable (Poise Maximum Absorbency) PADS No No   Sig: Use 2 (two) times a day Size 6 long   Incontinence Supply Disposable MISC   No No   Sig: Pt uses 3 at night as needed   STARCH-MALTO DEXTRIN (Thick-It) POWD   No No   Sig: Take by mouth in the morning   acetaminophen (TYLENOL) 650 mg CR tablet   Yes No   Sig: Take 650 mg by mouth every 8 (eight) hours as needed for mild pain   albuterol (2 5 mg/3 mL) 0 083 % nebulizer solution   Yes No   Sig: TAKE 3ML BY NEBULIZER EVERY 6 HOURS AS  NEEDED FOR WHEEZING OR SHORTNESS OR BREATH   aspirin (ECOTRIN LOW STRENGTH) 81 mg EC tablet   No No   Sig: Take 1 tablet (81 mg total) by mouth daily   atorvastatin (LIPITOR) 10 mg tablet   No No   Sig: Take 1 tablet (10 mg total) by mouth daily at bedtime   dimethicone 1 % cream   No No   Sig: Apply topically 2 (two) times a day as needed for dry skin   donepezil (ARICEPT) 10 mg tablet   No No   Sig: Take 1 tablet (10 mg total) by mouth daily at bedtime   gabapentin (NEURONTIN) 100 mg capsule   No No   Sig: Take 1 capsule (100 mg total) by mouth daily at bedtime   guaiFENesin (MUCINEX) 600 mg 12 hr tablet   No No   Sig: Take 1 tablet (600 mg total) by mouth every 12 (twelve) hours   levothyroxine 88 mcg tablet   No No   Sig: Take 1 tablet (88 mcg total) by mouth daily   melatonin 1 mg   No No   Sig: Take 1 tablet (1 mg total) by mouth daily at bedtime   metoprolol tartrate (LOPRESSOR) 25 mg tablet   No No   Sig: Take 0 5 tablets (12 5 mg total) by mouth 2 (two) times a day   montelukast (SINGULAIR) 10 mg tablet   No No   Sig: Take 1 tablet (10 mg total) by mouth daily at bedtime   nitroglycerin (NITROSTAT) 0 4 mg SL tablet   Yes No   Sig: Place 0 4 mg under the tongue every 5 (five) minutes as needed for chest pain   Patient not taking: No sig reported   omeprazole (PriLOSEC) 20 mg delayed release capsule   No No   Sig: Take 1 capsule (20 mg total) by mouth daily      Facility-Administered Medications: None       Past Medical History:   Diagnosis Date   • Bell's palsy remote   • Coronary artery disease    • Diabetes mellitus ([address] )    • Disease of thyroid gland    • GERD (gastroesophageal reflux disease)    • Hip fracture (HCC)    • Hyperlipidemia    • Hypertension    • Psychiatric disorder    • Stroke [facility])        History reviewed  No pertinent surgical history  Family History   Problem Relation Age of Onset   • Diabetes Mother    • Cancer Father    • Cancer Sister    • Cancer Brother      I have reviewed and agree with the history as documented  E-Cigarette/Vaping   • E-Cigarette Use Never User      E-Cigarette/Vaping Substances     Social History     Tobacco Use   • Smoking status: Former Smoker   • Smokeless tobacco: Never Used   Vaping Use   • Vaping Use: Never used   Substance Use Topics   • Alcohol use: No   • Drug use: No        Review of Systems   All other systems reviewed and are negative  Physical Exam  ED Triage Vitals   Temperature Pulse Respirations Blood Pressure SpO2   10/06/22 2210 10/06/22 2149 10/06/22 2149 10/06/22 2150 10/06/22 2149   98 6 °F (37 °C) 90 20 (!) 184/88 99 %      Temp Source Heart Rate Source Patient Position - Orthostatic VS BP Location FiO2 (%)   10/06/22 2210 10/06/22 2149 10/06/22 2149 10/06/22 2149 --   Rectal Monitor Lying Right arm       Pain Score       --                    Orthostatic Vital Signs  Vitals:    10/06/22 2230 10/06/22 2300 10/06/22 2330 10/07/22 0000   BP: 113/58 122/57 126/61 126/65   Pulse: 82 82 84 84   Patient Position - Orthostatic VS: Lying Lying Lying Lying       Physical Exam  Vitals and nursing note reviewed  Constitutional:       General: She is in acute distress  Appearance: She is well-developed  She is ill-appearing  HENT:      Head: Normocephalic and atraumatic  Right Ear: External ear normal       Left Ear: External ear normal       Nose: Nose normal       Mouth/Throat:      Mouth: Mucous membranes are moist       Comments: Significant oral secretions, gurgling with breathing    Eyes: Conjunctiva/sclera: Conjunctivae normal    Cardiovascular:      Rate and Rhythm: Normal rate and regular rhythm  Heart sounds: No murmur heard  Pulmonary:      Comments: Increased work of breathing  Bilateral rhonchi and wheezing  Abdominal:      General: There is no distension  Palpations: Abdomen is soft  Tenderness: There is no abdominal tenderness  Musculoskeletal:         General: No deformity  Cervical back: Neck supple  Right lower leg: No edema  Left lower leg: No edema  Skin:     General: Skin is warm and dry  Neurological:      General: No focal deficit present  Mental Status: She is alert  Comments: Daughter at bedside reports that her mother is more confused than usual   Not at baseline           ED Medications  Medications   metoprolol tartrate (LOPRESSOR) partial tablet 12 5 mg (has no administration in time range)   donepezil (ARICEPT) tablet 10 mg (10 mg Oral Not Given 10/7/22 0024)   DULoxetine (CYMBALTA) delayed release capsule 20 mg (has no administration in time range)   aspirin (ECOTRIN LOW STRENGTH) EC tablet 81 mg (has no administration in time range)   montelukast (SINGULAIR) tablet 10 mg (10 mg Oral Not Given 10/7/22 0024)   atorvastatin (LIPITOR) tablet 10 mg (10 mg Oral Not Given 10/7/22 0024)   gabapentin (NEURONTIN) capsule 100 mg (100 mg Oral Not Given 10/7/22 0024)   levothyroxine tablet 88 mcg (has no administration in time range)   melatonin tablet 1 mg (1 mg Oral Not Given 10/7/22 0025)   docusate sodium (COLACE) capsule 50 mg (has no administration in time range)   pantoprazole (PROTONIX) EC tablet 40 mg (has no administration in time range)   enoxaparin (LOVENOX) subcutaneous injection 40 mg (has no administration in time range)   ipratropium-albuterol (DUO-NEB) 0 5-2 5 mg/3 mL inhalation solution **ADS Override Pull** (3 mL  Given 10/6/22 2209)   cefepime (MAXIPIME) 2 g/50 mL dextrose IVPB (0 mg Intravenous Stopped 10/6/22 2308)   vancomycin (VANCOCIN) IVPB (premix in dextrose) 750 mg 150 mL (0 mg/kg × 42 2 kg Intravenous Stopped 10/6/22 2342)       Diagnostic Studies  Results Reviewed     Procedure Component Value Units Date/Time    COVID only [[id number]]  (Normal) Collected: 10/06/22 2236    Lab Status: Final result Specimen: Nares from Nose Updated: 10/07/22 0007     SARS-CoV-2 Negative    Narrative:      FOR PEDIATRIC PATIENTS - copy/paste COVID Guidelines URL to browser: [email or web address]  [employer]etric[employer]    SARS-CoV-2 assay is a Nucleic Acid Amplification assay intended for the  qualitative detection of nucleic acid from SARS-CoV-2 in nasopharyngeal  swabs  Results are for the presumptive identification of SARS-CoV-2 RNA  Positive results are indicative of infection with SARS-CoV-2, the virus  causing COVID-19, but do not rule out bacterial infection or co-infection  with other viruses  Laboratories within the [place] and its  territories are required to report all positive results to the appropriate  public health authorities  Negative results do not preclude SARS-CoV-2  infection and should not be used as the sole basis for treatment or other  patient management decisions  Negative results must be combined with  clinical observations, patient history, and epidemiological information  This test has not been FDA cleared or approved  This test has been authorized by FDA under an Emergency Use Authorization  (EUA)  This test is only authorized for the duration of time the  declaration that circumstances exist justifying the authorization of the  emergency use of an in vitro diagnostic tests for detection of SARS-CoV-2  virus and/or diagnosis of COVID-19 infection under section 564(b)(1) of  the Act, 21 U  S C  584ILJ-6(W)(3), unless the authorization is terminated  or revoked sooner  The test has been validated but independent review by FDA  and CLIA is pending      Test performed using [employer] Gene[employer]pert: This RT-PCR assay targets N2,  a region unique to SARS-CoV-2  A conserved region in the E-gene was chosen  for pan-Sarbecovirus detection which includes SARS-CoV-2  According to CMS-2020-01-R, this platform meets the definition of high-throughput technology  MRSA culture [[id number]]     Lab Status: No result Specimen: Nares     Procalcitonin [[id number]]  (Normal) Collected: 10/06/22 2205    Lab Status: Final result Specimen: Blood from Arm, Right Updated: 10/06/22 2256     Procalcitonin 0 13 ng/ml     Lactic acid [[id number]]  (Normal) Collected: 10/06/22 2205    Lab Status: Final result Specimen: Blood from Arm, Right Updated: 10/06/22 2241     LACTIC ACID 1 0 mmol/L     Narrative:      Result may be elevated if tourniquet was used during collection      Comprehensive metabolic panel [[id number]]  (Abnormal) Collected: 10/06/22 2205    Lab Status: Final result Specimen: Blood from Arm, Right Updated: 10/06/22 2236     Sodium 138 mmol/L      Potassium 4 0 mmol/L      Chloride 102 mmol/L      CO2 32 mmol/L      ANION GAP 4 mmol/L      BUN 22 mg/dL      Creatinine 0 47 mg/dL      Glucose 146 mg/dL      Calcium 8 9 mg/dL      Corrected Calcium 10 3 mg/dL      AST 32 U/L      ALT 20 U/L      Alkaline Phosphatase 209 U/L      Total Protein 6 9 g/dL      Albumin 2 3 g/dL      Total Bilirubin 0 19 mg/dL      eGFR 86 ml/min/1 73sq m     Narrative:      [facility] guidelines for Chronic Kidney Disease (CKD):   •  Stage 1 with normal or high GFR (GFR > 90 mL/min/1 73 square meters)  •  Stage 2 Mild CKD (GFR = 60-89 mL/min/1 73 square meters)  •  Stage 3A Moderate CKD (GFR = 45-59 mL/min/1 73 square meters)  •  Stage 3B Moderate CKD (GFR = 30-44 mL/min/1 73 square meters)  •  Stage 4 Severe CKD (GFR = 15-29 mL/min/1 73 square meters)  •  Stage 5 End Stage CKD (GFR <15 mL/min/1 73 square meters)  Note: GFR calculation is accurate only with a steady state creatinine Protime-INR [[id number]]  (Normal) Collected: 10/06/22 2205    Lab Status: Final result Specimen: Blood from Arm, Right Updated: 10/06/22 2233     Protime 12 9 seconds      INR 0 96    APTT [[id number]]  (Normal) Collected: 10/06/22 2205    Lab Status: Final result Specimen: Blood from Arm, Right Updated: 10/06/22 2233     PTT 27 seconds     CBC and differential [[id number]]  (Abnormal) Collected: 10/06/22 2205    Lab Status: Final result Specimen: Blood from Arm, Right Updated: 10/06/22 2217     WBC 16 46 Thousand/uL      RBC 3 96 Million/uL      Hemoglobin 10 5 g/dL      Hematocrit 36 5 %      MCV 92 fL      MCH 26 5 pg      MCHC 28 8 g/dL      RDW 14 9 %      MPV 9 1 fL      Platelets 992 Thousands/uL      nRBC 0 /100 WBCs      Neutrophils Relative 83 %      Immat GRANS % 1 %      Lymphocytes Relative 10 %      Monocytes Relative 4 %      Eosinophils Relative 2 %      Basophils Relative 0 %      Neutrophils Absolute 13 79 Thousands/µL      Immature Grans Absolute 0 12 Thousand/uL      Lymphocytes Absolute 1 62 Thousands/µL      Monocytes Absolute 0 65 Thousand/µL      Eosinophils Absolute 0 25 Thousand/µL      Basophils Absolute 0 03 Thousands/µL     Blood culture #1 [[id number]] Collected: 10/06/22 2204    Lab Status: In process Specimen: Blood from Arm, Left Updated: 10/06/22 2214    Blood culture #2 [[id number]] Collected: 10/06/22 2205    Lab Status: In process Specimen: Blood from Arm, Right Updated: 10/06/22 2214                 XR chest portable   ED Interpretation by Faraz Bhardwaj MD (10/06 2301)   Left basilar infiltrate  Chest x-ray is not significantly changed from last chest x-ray              Procedures  Procedures      ED Course  ED Course as of 10/07/22 0026   Thu Oct 06, 2022   2245 LACTIC ACID: 1 0                             SBIRT 22yo+    Flowsheet Row Most Recent Value   SBIRT (23 yo +)    In order to provide better care to our patients, we are screening all of our patients for alcohol and drug use  Would it be okay to ask you these screening questions? Unable to answer at this time Filed at: 10/06/2022 2152                [facility]  Number of Diagnoses or Management Options  Aspiration into airway, initial encounter  Respiratory distress  Diagnosis management comments: 55-year-old woman presenting respiratory distress  Mainly concerning for pneumonia and aspiration given patient's history  Will assess with sepsis evaluation, CXR, COVID swab  Given significant wheezing, will treat with DuoNeb  Blood cultures collected, will give cefepime and vancomycin given significant comorbities  After Duoneb, patient quickly weaned to home O2 of 2L  Labs do not indicate sepsis at this time  CXR unchanged from prior admission, given patient's significant pulmonary fibrosis, difficulty to assess for pneumonia, will assume so given symptoms  Admitted to medicine  Disposition  Final diagnoses:   Respiratory distress   Aspiration into airway, initial encounter     Time reflects when diagnosis was documented in both MDM as applicable and the Disposition within this note     Time User Action Codes Description Comment    10/6/2022 11:01 PM [employer] Add [R06 03] Respiratory distress     10/6/2022 11:01 PM [employer] Add [T17 908A] Aspiration into airway, initial encounter       ED Disposition     ED Disposition   Admit    Condition   Stable    Date/Time   Thu Oct 6, 2022 11:01 PM    Comment              Follow-up Information    None         Patient's Medications   Discharge Prescriptions    No medications on file     No discharge procedures on file  PDMP Review     None           ED Provider  Attending physically available and evaluated Kelton Sanchez I managed the patient along with the ED Attending      Electronically Signed by         Oren Shepherd MD  10/07/22 7933

## 2022-10-07 NOTE — RESPIRATORY THERAPY NOTE
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)      Social History     Socioeconomic History    Marital status: Single     Spouse name: None    Number of children: None    Years of education: None    Highest education level: None   Occupational History    None   Tobacco Use    Smoking status: Former Smoker    Smokeless tobacco: Never Used   Vaping Use    Vaping Use: Never used   Substance and Sexual Activity    Alcohol use: No    Drug use: No    Sexual activity: None   Other Topics Concern    None   Social History Narrative    None     Social Determinants of Health     Financial Resource Strain: Not on file   Food Insecurity: No Food Insecurity    Worried About Running Out of Food in the Last Year: Never true    Ran Out of Food in the Last Year: Never true   Transportation Needs: No Transportation Needs    Lack of Transportation (Medical): No    Lack of Transportation (Non-Medical):  No   Physical Activity: Not on file   Stress: Not on file   Social Connections: Not on file   Intimate Partner Violence: Not on file   Housing Stability: Low Risk     Unable to Pay for Housing in the Last Year: No    Number of Places Lived in the Last Year: 1    Unstable Housing in the Last Year: No       Subjective         Objective    Physical Exam:   Assessment Type: Assess only  General Appearance: Awake  Respiratory Pattern: Normal  Chest Assessment: Chest expansion symmetrical  Bilateral Breath Sounds: Diminished  Suction: Trach  O2 Device: [facility]    Vitals:  Blood pressure 147/87, pulse (!) 120, temperature 100 °F (37 8 °C), temperature source Tympanic, resp  rate 18, SpO2 95 %  Imaging and other studies: I have personally reviewed pertinent films in PACS    O2 Device: [facility]     Plan    Respiratory Plan: Mild Distress pathway  Airway Clearance Plan: Discontinue Protocol     Resp Comments: Pt assessed at this time per airway clearance/resp protocol  Pt at this time in no acute resp distress  SpO2 on 6l midflow WNL, if tolerated will transition to NC  Per chart pt has no pulmonary HX, albuterol prn listed in med record  Pt admitted for left upper lobe pna  At this time BS decreased and mostly clear  Cough congested, flutter valve attempted but pt unable to perform  Vest therapy not indicated at this time  MD at bedside  Plan is to follow pt for 48 hours on protocol and will continue bronchodilator therapy during that time

## 2022-10-07 NOTE — ASSESSMENT & PLAN NOTE
Patient w/ hx of dementia and COPD (no PFTs) was recently admitted in August for PNA and was sent home on 2L O2  Now presenting with cough and decreased mentation  At baseline is AAOx0  On presentation, requiring 15 L O2 on non-rebreather and has been transitioned down to 6L O2 NC and is satting in high 90s  Tachycardic to 120s  Afebrile (Tmax 100F) Pulmonary exam significant for diffuse rhonchi  Leukocytosis to 24 w/ negative procal and lactate  CXR showed left basilar infiltrate stable from previous admission  Presentation is likely 2/2 Pneumonia vs Aspiration Pneumonitis vs less likely COPD exacerbation    Plan:  - currently on 4 L NC satting mid 90s, titrate to maintain pulse oxygen levels above 88%  - MRSA negative => vancomycin discontinued  - blood cultures, sputum cultures have been negative  -per pulmonology recommendations, Zosyn discontinued and cefdinir 300 mg b i d  Started for total 7 day course    Also started Mucinex and Xopenex b i d   - continue montelukast  - pending discharge home once suction machine is approved

## 2022-10-07 NOTE — PROGRESS NOTES
INTERNAL MEDICINE RESIDENCY PROGRESS NOTE     Name: Gilbert Harper   Age & Sex: 80 y o  female   MRN: [id number]  Unit/Bed#: [facility] 302-01   Encounter: [id number]  Team: SOD Team C     PATIENT INFORMATION     Name: Gilbert Harper   Age & Sex: 80 y o  female   MRN: [id number]  Hospital Stay Days: 1    ASSESSMENT/PLAN     Principal Problem:    Acute on chronic respiratory failure with hypoxia [facility])  Active Problems:    Left upper lobe pneumonia    CAD (coronary artery disease), native coronary artery    Dementia due to medical condition without behavioral disturbance ([facility])    COPD, severity to be determined ([facility] [address] )    Hypertension      Hypertension  Assessment & Plan  Continue metoprolol tartrate 12 5 mg b i d     COPD, severity to be determined [facility])  Assessment & Plan  Hx of COPD on Singular and bronchodilators  - Will continue home medications   - Addition of azithromycin due to CAP and anti-inflammatory properties for COPD       Dementia due to medical condition without behavioral disturbance [facility])  Assessment & Plan  Continue home regimen of alprazolam, donepezil, duloxetine, melatonin, gabapentin  Per family, patient is occasionally tried to get out of bed at night  Plan:  Family has had to use soft restraints occasionally at home  Will defer soft restraints at this time  If attempts to redirect and virtual continuous observation fail, can consider soft restraints    CAD (coronary artery disease), native coronary artery  Assessment & Plan  Continue aspirin, statin    Left upper lobe pneumonia  Assessment & Plan  Noted to have left basilar opacity similar to previous with increasing o2 requirement  Leukocytosis of 16, negative procal  Patient initially started on vanc/cefepime in the ED   Currently requiring 6L (baseline of 2L at home)   - Concern for potential gram-negative pathogens (I e  Pseudomonas) given recent infection and Anaerobic pathogens in s/o potential aspiration  -Cont Vanc/Zosyn/azithromycin due to concern for severe CAP with high risk MDR with hx of COPD  - MRSA pending   - Procalcitonin in the AM   - Monitor WBC/fever curve   - Monitor O2 saturations > 90%     * Acute on chronic respiratory failure with hypoxia [facility])  Assessment & Plan  Patient w/ hx of dementia and COPD (no PFTs) was recently admitted in August for PNA and was sent home on 2L O2  Now presenting with cough and decreased mentation  At baseline is AAOx0  On presentation, requiring 15 L O2 on non-rebreather and has been transitioned down to 6L O2 NC and is satting in high 90s  Tachycardic to 120s  Afebrile (Tmax 100F) Pulmonary exam significant for diffuse rhonchi  Leukocytosis to 24 w/ negative procal and lactate  CXR showed left basilar infiltrate stable from previous admission  Presentation is likely 2/2 Pneumonia vs Aspiration Pneumonitis vs less likely COPD exacerbation    Plan:  -Currently on Vancomycin, Zosyn, and Azithromycin => Continue until cultures and sensitivities result  -Continue DuoNebs q6, Respiratory protocol, and Airway clearance protocol   -Will consult Pulmonology given recent admission for PNA => Appeciate recs  -Continue home Montelukast        Disposition: Switch from Cefepime to Zosyn  Continue Azithromycin and Vancomycin  Await urine and blood cultures for more targeted therapy  SUBJECTIVE     Patient seen and examined  No acute events overnight  79 yo presented with hypoxia, increased oral secretions, and coughing  She developed a low-grade fever this morning  I was unable to attain a comprehensive history from her  Patient was lethargic and only minimally responsive to the first few questions before ceasing responses due to an apparent loss of consciousness  Family/friends of the patient were not available at the time of the encounter to provide updates/changes in patient  I was unable to obtain a complete review of systems due to patient unresponsiveness   Patient was on 6 Liter nasal cannula  OBJECTIVE     Vitals:    10/07/22 0630 10/07/22 0800 10/07/22 0853 10/07/22 0951   BP: 144/78 147/87  144/80   BP Location: Right arm   Right arm   Pulse: (!) 110 (!) 112 (!) 120 (!) 108   Resp: 19 18  18   Temp:  100 °F (37 8 °C)  98 5 °F (36 9 °C)   TempSrc:  Tympanic  Oral   SpO2: 96% 95% 95% 95%      Temperature:   Temp (24hrs), Av °F (37 2 °C), Min:98 5 °F (36 9 °C), Max:100 °F (37 8 °C)    Temperature: 98 5 °F (36 9 °C)  Intake & Output:  I/O         10/05 0701  10/06 0700 10/06 0701  10/07 0700 10/07 0701  10/08 0700    IV Piggyback  400     Total Intake  400     Net  +400                  Weights: There is no height or weight on file to calculate BMI  Weight (last 2 days)       None          Physical Exam  Constitutional:       General: She is in acute distress  Appearance: She is underweight  She is ill-appearing and toxic-appearing  Interventions: Nasal cannula in place  HENT:      Head: Normocephalic and atraumatic  Right Ear: Tympanic membrane, ear canal and external ear normal  There is no impacted cerumen  Left Ear: Tympanic membrane, ear canal and external ear normal  There is no impacted cerumen  Nose: Congestion present  Mouth/Throat:      Comments: Patient could not open mouth at time of exam  Eyes:      General: No scleral icterus  Right eye: Discharge present  Left eye: Discharge present  Conjunctiva/sclera:      Right eye: Right conjunctiva is injected  Left eye: Left conjunctiva is injected  Cardiovascular:      Rate and Rhythm: Regular rhythm  Tachycardia present  Heart sounds: Normal heart sounds  Pulmonary:      Effort: Tachypnea present  Breath sounds: Rhonchi present  Abdominal:      General: Bowel sounds are normal    Musculoskeletal:      Cervical back: No edema  Feet:      Right foot:      Skin integrity: Warmth and dry skin present        Toenail Condition: Right toenails are abnormally thick  Left foot:      Skin integrity: Warmth and dry skin present  Toenail Condition: Left toenails are abnormally thick  Skin:     General: Skin is warm  Coloration: Skin is pale  Skin is not jaundiced  Neurological:      Mental Status: She is lethargic and disoriented  Comments: Only oriented to person   Psychiatric:         Speech: She is noncommunicative  Comments: Could only answer yes/no question for a short period and mumbles other answers       LABORATORY DATA     Labs: I have personally reviewed pertinent reports  Results from last 7 days   Lab Units 10/07/22  0432 10/06/22  2205   WBC Thousand/uL 24 00* 16 46*   HEMOGLOBIN g/dL 10 2* 10 5*   HEMATOCRIT % 34 0* 36 5   PLATELETS Thousands/uL 290 334   NEUTROS PCT % 90* 83*   MONOS PCT % 4 4      Results from last 7 days   Lab Units 10/07/22  0432 10/06/22  2205   POTASSIUM mmol/L 5 0 4 0   CHLORIDE mmol/L 103 102   CO2 mmol/L 32 32   BUN mg/dL 21 22   CREATININE mg/dL 0 41* 0 47*   CALCIUM mg/dL 8 7 8 9   ALK PHOS U/L  --  209*   ALT U/L  --  20   AST U/L  --  32              Results from last 7 days   Lab Units 10/06/22  2205   INR  0 96   PTT seconds 27     Results from last 7 days   Lab Units 10/06/22  2205   LACTIC ACID mmol/L 1 0           IMAGING & DIAGNOSTIC TESTING     Radiology Results: I have personally reviewed pertinent reports  XR chest portable    Result Date: 10/7/2022  Impression: Persistent increase in interstitial markings which could be due to fibrosis and/or edema  Persistent left base opacity, atelectasis and/or pneumonia in the appropriate clinical setting  Workstation performed: [id number]     Other Diagnostic Testing: I have personally reviewed pertinent reports      ACTIVE MEDICATIONS     Current Facility-Administered Medications   Medication Dose Route Frequency    aspirin (ECOTRIN LOW STRENGTH) EC tablet 81 mg  81 mg Oral Daily    atorvastatin (LIPITOR) tablet 10 mg  10 mg Oral HS azithromycin (ZITHROMAX) 500 mg in sodium chloride 0 9% 250mL IVPB 500 mg  500 mg Intravenous Q24H    docusate sodium (COLACE) capsule 50 mg  50 mg Oral Daily PRN    donepezil (ARICEPT) tablet 10 mg  10 mg Oral HS    DULoxetine (CYMBALTA) delayed release capsule 20 mg  20 mg Oral Daily    enoxaparin (LOVENOX) subcutaneous injection 40 mg  40 mg Subcutaneous Daily    gabapentin (NEURONTIN) capsule 100 mg  100 mg Oral HS    levalbuterol (XOPENEX) inhalation solution 1 25 mg  1 25 mg Nebulization TID    levothyroxine tablet 88 mcg  88 mcg Oral Early Morning    melatonin tablet 3 mg  3 mg Oral HS    metoprolol tartrate (LOPRESSOR) partial tablet 12 5 mg  12 5 mg Oral Q12H CHERYL    montelukast (SINGULAIR) tablet 10 mg  10 mg Oral HS    pantoprazole (PROTONIX) EC tablet 40 mg  40 mg Oral Early Morning    piperacillin-tazobactam (ZOSYN) 4 5 g in sodium chloride 0 9 % 100 mL IVPB  4 5 g Intravenous Q6H    sodium chloride 0 9 % infusion  75 mL/hr Intravenous Continuous    sodium chloride 0 9 % inhalation solution 3 mL  3 mL Nebulization TID    vancomycin (VANCOCIN) IVPB (premix in dextrose) 500 mg 100 mL  12 5 mg/kg Intravenous Q12H         Portions of the record may have been created with voice recognition software  Occasional wrong word or "sound a like" substitutions may have occurred due to the inherent limitations of voice recognition software    Read the chart carefully and recognize, using context, where substitutions have occurred   ==  Zeus [address]

## 2022-10-07 NOTE — ED NOTES
Pt febrile this AM and tachy on monitor  RN found pt had soft swollen lump on back of neck  Incontinence care provided  Per night shift pt failed swallow eval, speech consulted  TT sent to Dr Romelia Millan to make aware  Provider at bedside            Jose Calvillo RN  10/07/22 0409

## 2022-10-07 NOTE — ASSESSMENT & PLAN NOTE
Noted to have left basilar opacity similar to previous with increasing o2 requirement  Leukocytosis of 16, negative procal  Patient initially started on vanc/cefepime in the ED  Was previously requiring 6 L O2 and is now satting well at this 2 L which is her baseline  - has remained afebrile and white count down trended to 10 42  - blood cultures negative at 24 hours; MRSA negative; procalcitonin negative x2; no growth on sputum cultures  - azithromycin discontinued per pulmonology recommendations and vancomycin discontinued due to negative MRSA  - per pulmonology recommendations, Zosyn discontinued yesterday and started on cefdinir 300 mg b i d   For total 7 day course  - Monitor WBC/fever curve   - Monitor O2 saturations > 90%

## 2022-10-07 NOTE — PROGRESS NOTES
Vancomycin Assessment    Zehra June is a 80 y o  female who is currently receiving vancomycin 500mg every 12 hours for Pneumonia     Relevant clinical data and objective history reviewed:  Creatinine   Date Value Ref Range Status   10/06/2022 0 47 (L) 0 60 - 1 30 mg/dL Final     Comment:     Standardized to IDMS reference method   09/26/2022 0 40 (L) 0 60 - 1 30 mg/dL Final     Comment:     Standardized to IDMS reference method   09/01/2022 0 47 (L) 0 60 - 1 30 mg/dL Final     Comment:     Standardized to IDMS reference method     /80 (BP Location: Right arm)   Pulse 90   Temp 98 6 °F (37 °C) (Rectal)   Resp 22   SpO2 99%   No intake/output data recorded  Lab Results   Component Value Date/Time    BUN 22 10/06/2022 10:05 PM    WBC 16 46 (H) 10/06/2022 10:05 PM    HGB 10 5 (L) 10/06/2022 10:05 PM    HCT 36 5 10/06/2022 10:05 PM    MCV 92 10/06/2022 10:05 PM     10/06/2022 10:05 PM     Temp Readings from Last 3 Encounters:   10/06/22 98 6 °F (37 °C) (Rectal)   09/16/22 97 7 °F (36 5 °C) (Tympanic)   09/02/22 99 1 °F (37 3 °C)     Vancomycin Days of Therapy: 1    Assessment/Plan  The patient is currently on vancomycin utilizing scheduled dosing based on actual body weight  Baseline risks associated with therapy include: pre-existing renal impairment  The patient is currently receiving 500mg every 12 hours and is clinically appropriate and dose will be continued  Pharmacy will also follow closely for s/sx of nephrotoxicity, infusion reactions, and appropriateness of therapy  BMP and CBC will be ordered per protocol  Plan for trough as patient approaches steady state, prior to the 4th  dose at approximately on 10/08/22 at 1000  Due to infection severity, will target a trough of 15-20 (appropriate for most indications)   Pharmacy will continue to follow the patient’s culture results and clinical progress daily      Werner Viveros, Pharmacist

## 2022-10-07 NOTE — CONSULTS
PULMONOLOGY CONSULT NOTE     Name: Corby Leon   Age & Sex: 80 y o  female   MRN: [id number]  Unit/Bed#: [facility] 302-01   Encounter: [id number]        Reason for consultation: Re-admission for possible pneumonia vs aspiration    Requesting physician: Jerman Thompson MD    Assessment/Plan:  1  Acute on chronic respiratory failure  · Pt w/PMH of COPD and recent admission in August for PNA (tx w/Ceftriaxone, on 2L O2 since) who presents with confusion and productive cough  Currently requiring 6 LPM   · Likely in the setting of PNA, see below  · Supplemental oxygen to maintain O2 > 88%  · Respiratory protocol    2  Pneumonia  · Drip score >4, meeting sepsis criteria due to tachycardia and WBC count w/pneumonia as suspected source  · CAP vs hospital acquired vs aspiration pneumonia  · CXR 10/6 - Persistent increase in interstitial markings, persistent L base opacity  · Speech eval - Puree/ liquid honey diet  · Leukocytosis increased from 16 to 24, negative pro ricky and lactate  · Monitor WBC/fever curve  · Blood and MRSA cultures pending  · Continue Zosyn and Vancomycin  · Recommend discontinuing Azithromycin  · Aspiration precautions  · Speech eval 10/7 - Oral closure is adequate  Transfer time intermittently delayed with suspected decreased oral control  Also suspect swallow initiation time is min delayed  The patient has cough response to tsp of nectar thick liquids  3  COPD  · No evidence of current exacerbation  · Continue Singulair and bronchodilators  · Will hold off on steroids at this time    4  Dementia  · Pt's mental status not at baseline per family, speech is not as clear  · Management per primary team - home regimen of alprazolam and donepezil, duloxetine, melatonin and gabapentin    5   CAD, HTN, HLD    History of Present Illness   HPI:  Corby Loen is a 80 y o  female w/PMH of COPD (2L supplemental O2 since last admission), dementia (AAOx0 at baseline), DM, HTN, HLD, prior stroke, and recent admission in August for PNA (tx w/Ceftriaxone, d/c'd on 2L O2) who presents with confusion and productive cough  EMS was called due to respiratory distress and found pt hypoxic to 70%  Daughter and granddaughter are caretakers, endorsed increased secretions, cough, and congestion on day of presentation  Patient was hospitalized recently on 8/30 for pneumonia, treated with ceftriaxone  Patient was noted to have feeding difficulties per speech and was told to have a mechanical soft diet with thickened liquids  Daughter states that they have been adding thickening agent for liquids  Daughter mentioned today that around 3pm the day before patient came to the hospital she had a coughing episode after her meal  This seemed to resolve in the evening, but pt deteriorated the next day  In ED, pt was hypoxic requiring 15L  non-rebreather, transitioned to [facility]  Afebrile, Leukocytosis to 24, Negative pro ricky and lactate  Blood cultures pending  CXR showed persistent increase in interstitial markings and persistent L base opacity, concerning for pneumonia  Pt was started on Vancomycin, Zosyn, and Azithromycin    Endorse cough, increased drooling, SOB, episodes of choking/coughing while eating, decreased fluid intake  Denies n/v, changes in bowel habits, no hemoptysis, hematuria, leg swelling  Discussed with family    Review of systems:  12 point review of systems was completed and was otherwise negative except as listed in HPI  Historical Information   Past Medical History:   Diagnosis Date   • Bell's palsy remote   • Coronary artery disease    • Diabetes mellitus ([facility] [address] )    • Disease of thyroid gland    • GERD (gastroesophageal reflux disease)    • Hip fracture ([facility] [address] )    • Hyperlipidemia    • Hypertension    • Psychiatric disorder    • Stroke [facility])      History reviewed  No pertinent surgical history    Family History   Problem Relation Age of Onset   • Diabetes Mother    • Cancer Father    • Cancer Sister    • Cancer Brother        Social History: Smoked for 60+ years, quit around the age of [de-identified]   Cared for by daughter, granddaughter who is an NP, bedbound    Meds/Allergies   Current Facility-Administered Medications   Medication Dose Route Frequency   • aspirin (ECOTRIN LOW STRENGTH) EC tablet 81 mg  81 mg Oral Daily   • atorvastatin (LIPITOR) tablet 10 mg  10 mg Oral HS   • azithromycin (ZITHROMAX) 500 mg in sodium chloride 0 9% 250mL IVPB 500 mg  500 mg Intravenous Q24H   • docusate sodium (COLACE) capsule 50 mg  50 mg Oral Daily PRN   • donepezil (ARICEPT) tablet 10 mg  10 mg Oral HS   • DULoxetine (CYMBALTA) delayed release capsule 20 mg  20 mg Oral Daily   • enoxaparin (LOVENOX) subcutaneous injection 40 mg  40 mg Subcutaneous Daily   • gabapentin (NEURONTIN) capsule 100 mg  100 mg Oral HS   • levalbuterol (XOPENEX) inhalation solution 1 25 mg  1 25 mg Nebulization TID   • levothyroxine tablet 88 mcg  88 mcg Oral Early Morning   • melatonin tablet 3 mg  3 mg Oral HS   • metoprolol (LOPRESSOR) injection 5 mg  5 mg Intravenous Q6H   • montelukast (SINGULAIR) tablet 10 mg  10 mg Oral HS   • pantoprazole (PROTONIX) EC tablet 40 mg  40 mg Oral Early Morning   • piperacillin-tazobactam (ZOSYN) 4 5 g in sodium chloride 0 9 % 100 mL IVPB  4 5 g Intravenous Q6H   • sodium chloride 0 9 % infusion  75 mL/hr Intravenous Continuous   • sodium chloride 0 9 % inhalation solution 3 mL  3 mL Nebulization TID   • vancomycin (VANCOCIN) IVPB (premix in dextrose) 500 mg 100 mL  12 5 mg/kg Intravenous Q12H     Medications Prior to Admission   Medication   • acetaminophen (TYLENOL) 650 mg CR tablet   • albuterol (2 5 mg/3 mL) 0 083 % nebulizer solution   • ALPRAZolam (XANAX) 0 5 mg tablet   • aspirin (ECOTRIN LOW STRENGTH) 81 mg EC tablet   • atorvastatin (LIPITOR) 10 mg tablet   • dimethicone 1 % cream   • Docusate Sodium 100 MG capsule   • donepezil (ARICEPT) 10 mg tablet   • DULoxetine (CYMBALTA) 20 mg capsule   • gabapentin (NEURONTIN) 100 mg capsule   • Gauze Pads & Dressings (AMD Foam Dressing) 4"X4" PADS   • guaiFENesin (MUCINEX) 600 mg 12 hr tablet   • Incontinence Supplies MISC   • Incontinence Supply Disposable (Incontinence Brief Medium) MISC   • Incontinence Supply Disposable (Poise Maximum Absorbency) PADS   • Incontinence Supply Disposable MISC   • levothyroxine 88 mcg tablet   • melatonin 1 mg   • metoprolol tartrate (LOPRESSOR) 25 mg tablet   • montelukast (SINGULAIR) 10 mg tablet   • nitroglycerin (NITROSTAT) 0 4 mg SL tablet   • omeprazole (PriLOSEC) 20 mg delayed release capsule   • STARCH-MALTO DEXTRIN (Thick-It) POWD     No Known Allergies    Vitals: Blood pressure 144/80, pulse (!) 108, temperature 98 5 °F (36 9 °C), temperature source Oral, resp  rate 18, SpO2 95 %  , 6LNC, There is no height or weight on file to calculate BMI  Intake/Output Summary (Last 24 hours) at 10/7/2022 1213  Last data filed at 10/7/2022 0405  Gross per 24 hour   Intake 400 ml   Output --   Net 400 ml       Physical Exam  Vitals reviewed  Constitutional:       Comments: Thin, NAD, chronically ill-appearing, bedbound   HENT:      Head: Normocephalic and atraumatic  Comments: Large lipoma present on back of neck     Nose: Nose normal       Mouth/Throat:      Mouth: Mucous membranes are moist       Pharynx: Oropharynx is clear  Comments: Excessive drooling  Eyes:      Comments: Pt is blind in R eye, 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   Cardiovascular:      Rate and Rhythm: Tachycardia present  Heart sounds: Normal heart sounds  No murmur heard  No friction rub  No gallop  Pulmonary:      Comments: On 6L  Course rhonchi b/l  Abdominal:      General: Bowel sounds are normal       Palpations: Abdomen is soft  Skin:     General: Skin is warm and dry     Neurological:      Comments: Pt bed bound at baseline  She is not at baseline per family, speech not as clear  Pt speech is mumbled/garbled, follows commands, responsive to voice, occasionally answers questions appropriately  Chronic R facial droop from bell's palsy         Labs: I have personally reviewed pertinent lab results  , CBC:   Lab Results   Component Value Date    WBC 24 00 (H) 10/07/2022    HGB 10 2 (L) 10/07/2022    HCT 34 0 (L) 10/07/2022    MCV 92 10/07/2022     10/07/2022    MCH 27 7 10/07/2022    MCHC 30 0 (L) 10/07/2022    RDW 15 1 10/07/2022    MPV 9 1 10/07/2022    NRBC 0 10/07/2022   , CMP:   Lab Results   Component Value Date    SODIUM 137 10/07/2022    K 5 0 10/07/2022     10/07/2022    CO2 32 10/07/2022    BUN 21 10/07/2022    CREATININE 0 41 (L) 10/07/2022    CALCIUM 8 7 10/07/2022    AST 32 10/06/2022    ALT 20 10/06/2022    ALKPHOS 209 (H) 10/06/2022    EGFR 89 10/07/2022   , PT/INR:   Lab Results   Component Value Date    INR 0 96 10/06/2022     Laboratory and Diagnostics  Results from last 7 days   Lab Units 10/07/22  0432 10/06/22  2205   WBC Thousand/uL 24 00* 16 46*   HEMOGLOBIN g/dL 10 2* 10 5*   HEMATOCRIT % 34 0* 36 5   PLATELETS Thousands/uL 290 334   NEUTROS PCT % 90* 83*   MONOS PCT % 4 4     Results from last 7 days   Lab Units 10/07/22  0432 10/06/22  2205   SODIUM mmol/L 137 138   POTASSIUM mmol/L 5 0 4 0   CHLORIDE mmol/L 103 102   CO2 mmol/L 32 32   ANION GAP mmol/L 2* 4   BUN mg/dL 21 22   CREATININE mg/dL 0 41* 0 47*   CALCIUM mg/dL 8 7 8 9   GLUCOSE RANDOM mg/dL 116 146*   ALT U/L  --  20   AST U/L  --  32   ALK PHOS U/L  --  209*   ALBUMIN g/dL  --  2 3*   TOTAL BILIRUBIN mg/dL  --  0 19*          Results from last 7 days   Lab Units 10/06/22  2205   INR  0 96   PTT seconds 27          Results from last 7 days   Lab Units 10/06/22  2205   LACTIC ACID mmol/L 1 0                     Results from last 7 days   Lab Units 10/07/22  0432 10/06/22  2205   PROCALCITONIN ng/ml 0 14 0 13     Imaging and other studies: I have personally reviewed pertinent reports     and I have personally reviewed pertinent films in PACS  XR chest portable    Result Date: 10/7/2022  Impression: Persistent increase in interstitial markings which could be due to fibrosis and/or edema  Persistent left base opacity, atelectasis and/or pneumonia in the appropriate clinical setting  Workstation performed: [id number]       Pulmonary function testing: None on file    EKG, Pathology, and Other Studies: I have personally reviewed pertinent reports      10/6/22 Normal sinus rhythm  Cannot rule out Inferior infarct (cited on or before 30-AUG-2022)  Cannot rule out Anterior infarct (cited on or before 30-AUG-2022)  Abnormal ECG  When compared with ECG of 30-AUG-2022 11:56,  Questionable change in initial forces of Inferior leads  T wave inversion now evident in Inferior leads    Code Status: Level 3 - DNAR and DNI    VTE Pharmacologic Prophylaxis: Enoxaparin (Lovenox)  VTE Mechanical Prophylaxis: sequential compression device

## 2022-10-07 NOTE — H&P
INTERNAL MEDICINE RESIDENCY ADMISSION H&P     Name: Fernando Ramires   Age & Sex: 80 y o  female   MRN: [id number]  Unit/Bed#: CRB   Encounter: [id number]  Primary Care Provider: Rebekah Peralta MD    Code Status: Level 3 - DNAR and DNI  Admission Status: INPATIENT   Disposition: Patient requires Med/Surg    Admit to team: SOD Team C     ASSESSMENT/PLAN     Active Problems:    Left upper lobe pneumonia    CAD (coronary artery disease), native coronary artery    Dementia due to medical condition without behavioral disturbance (HCC)    COPD, severity to be determined ([facility] [address] )    Hypertension      * Left upper lobe pneumonia  Assessment & Plan  Noted to have left basilar opacity similar to previous with increasing o2 requirement  Leukocytosis of 16, negative procal  Patient initially started on vanc/cefepime in the ED  Currently requiring 6L (baseline of 2L at home)   - Cont Vanc/cefepime/azithromycin due to concern for severe CAP with high risk MDR with hx of COPD  - MRSA pending   - Procalcitonin in the AM   - Monitor WBC/fever curve   - Monitor O2 saturations > 90%     Hypertension  Assessment & Plan  Continue metoprolol tartrate 12 5 mg b i d     COPD, severity to be determined [facility])  Assessment & Plan  Hx of COPD on Singular and bronchodilators  - Will continue home medications   - Addition of azithromycin due to CAP and anti-inflammatory properties for COPD       Dementia due to medical condition without behavioral disturbance [facility])  Assessment & Plan  Continue home regimen of alprazolam, donepezil, duloxetine, melatonin, gabapentin  Per family, patient is occasionally tried to get out of bed at night       Plan:  · Family has had to use soft restraints occasionally at home  · Will defer soft restraints at this time     · If attempts to redirect and virtual continuous observation fail, can consider soft restraints    CAD (coronary artery disease), native coronary artery  Assessment & Plan  Continue aspirin, statin      VTE Pharmacologic Prophylaxis: Enoxaparin (Lovenox)  VTE Mechanical Prophylaxis: sequential compression device    CHIEF COMPLAINT     Chief Complaint   Patient presents with   • Respiratory Distress     Per EMS, pt was SOB earlier today along with a cough  Pt was found with sat's in the 70's  Pt started presenting this way around 1800  HISTORY OF PRESENT ILLNESS     24-year-old female with a history of dementia, COPD, diabetes, hypertension presenting with coughing and respiratory distress  Patient is alert and oriented times 0 at baseline so unable to get history from patient however daughter was called and provided history  Daughter states that patient was having a lot more coughing today and had to get a nebulizer treatment at home however patient continued to have productive cough  At 7:00 p m  Daughter noted patient to be gurgling her secretions and having difficulties breathing  Patient is on 2 L of O2 at baseline at home and daughter noted that her pulse ox was 96% at home however patient seemed to be more confused than usual and continued to have coughing  At that point in time daughter brought patient to the emergency room to be evaluated  Of note patient is bed-bound and lives with daughter with the help of a healthcare aide  Patient was hospitalized recently on 8/30 for pneumonia at that time was treated with ceftriaxone  Patient was noted to have feeding difficulties per speech and was told to have a mechanical soft diet with thickened liquids  Daughter states that they have been adding thickening agent for liquids and she has not noticed patient aspirating  Upon arrival to the ED patient was noted to be hypoxic and placed on 15 L non-rebreather mask, afebrile, normal heart rate, normal blood pressure  Labs were remarkable for leukocytosis of 16 4, pro count negative, lactic acid negative  Chest x-ray showed left basilar infiltrate similar to previous chest x-ray on 8/30  Patient was started on vanc and cefepime and admitted  REVIEW OF SYSTEMS     Review of Systems   Unable to perform ROS: Dementia     OBJECTIVE     Vitals:    10/06/22 2230 10/06/22 2300 10/06/22 2330 10/07/22 0000   BP: 113/58 122/57 126/61 126/65   BP Location: Right arm Right arm Right arm Right arm   Pulse: 82 82 84 84   Resp: 20 20 18 18   Temp:       TempSrc:       SpO2: 92% 94% 91% 90%      Temperature:   Temp (24hrs), Av 6 °F (37 °C), Min:98 6 °F (37 °C), Max:98 6 °F (37 °C)    Temperature: 98 6 °F (37 °C)  Intake & Output:  I/O       10/05 0701  10/06 0700 10/06 0701  10/07 0700    IV Piggyback  150    Total Intake  150    Net  +150              Weights: There is no height or weight on file to calculate BMI  Weight (last 2 days)     None        Physical Exam  Vitals and nursing note reviewed  Constitutional:       General: She is not in acute distress  Appearance: She is well-developed  She is not toxic-appearing  HENT:      Head: Normocephalic and atraumatic  Mouth/Throat:      Mouth: Mucous membranes are moist       Pharynx: Oropharynx is clear  Eyes:      Conjunctiva/sclera: Conjunctivae normal    Cardiovascular:      Rate and Rhythm: Normal rate and regular rhythm  Pulses: Normal pulses  Heart sounds: Normal heart sounds  No murmur heard  Pulmonary:      Effort: Pulmonary effort is normal  No respiratory distress  Breath sounds: Normal breath sounds  Chest:      Chest wall: No tenderness  Abdominal:      General: Abdomen is flat  There is no distension  Palpations: Abdomen is soft  Tenderness: There is no abdominal tenderness  Musculoskeletal:         General: Normal range of motion  Cervical back: Neck supple  Right lower leg: No edema  Left lower leg: No edema  Skin:     General: Skin is warm and dry  Neurological:      Mental Status: She is alert  Mental status is at baseline        Cranial Nerves: No cranial nerve deficit  Sensory: No sensory deficit  Motor: No weakness  Comments: Alert and not oriented, mumbling words        PAST MEDICAL HISTORY     Past Medical History:   Diagnosis Date   • Bell's palsy remote   • Coronary artery disease    • Diabetes mellitus ([address] )    • Disease of thyroid gland    • GERD (gastroesophageal reflux disease)    • Hip fracture (HCC)    • Hyperlipidemia    • Hypertension    • Psychiatric disorder    • Stroke [facility])      PAST SURGICAL HISTORY   History reviewed  No pertinent surgical history  SOCIAL & FAMILY HISTORY     Social History     Substance and Sexual Activity   Alcohol Use No     Social History     Substance and Sexual Activity   Drug Use No     Social History     Tobacco Use   Smoking Status Former Smoker   Smokeless Tobacco Never Used     Family History   Problem Relation Age of Onset   • Diabetes Mother    • Cancer Father    • Cancer Sister    • Cancer Brother      LABORATORY DATA     Labs: I have personally reviewed pertinent reports  Results from last 7 days   Lab Units 10/06/22  2205   WBC Thousand/uL 16 46*   HEMOGLOBIN g/dL 10 5*   HEMATOCRIT % 36 5   PLATELETS Thousands/uL 334   NEUTROS PCT % 83*   MONOS PCT % 4      Results from last 7 days   Lab Units 10/06/22  2205   POTASSIUM mmol/L 4 0   CHLORIDE mmol/L 102   CO2 mmol/L 32   BUN mg/dL 22   CREATININE mg/dL 0 47*   CALCIUM mg/dL 8 9   ALK PHOS U/L 209*   ALT U/L 20   AST U/L 32              Results from last 7 days   Lab Units 10/06/22  2205   INR  0 96   PTT seconds 27     Results from last 7 days   Lab Units 10/06/22  2205   LACTIC ACID mmol/L 1 0         Micro:  Lab Results   Component Value Date    BLOODCX No Growth After 5 Days  08/30/2022    BLOODCX Staphylococcus coagulase negative (A) 08/30/2022    BLOODCX No Growth After 5 Days   11/01/2018    URINECX >100,000 cfu/ml Escherichia coli (A) 08/30/2022    URINECX 20,000-29,000 cfu/ml Aerococcus urinae (A) 08/30/2022    URINECX >100,000 cfu/ml Escherichia coli (A) 11/12/2021    URINECX 40,000-49,000 cfu/ml  11/12/2021     IMAGING & DIAGNOSTIC TESTS     Imaging: I have personally reviewed pertinent reports  No results found  EKG, Pathology, and Other Studies: I have personally reviewed pertinent reports  ALLERGIES   No Known Allergies  MEDICATIONS PRIOR TO ARRIVAL     Prior to Admission medications    Medication Sig Start Date End Date Taking?  Authorizing Provider   acetaminophen (TYLENOL) 650 mg CR tablet Take 650 mg by mouth every 8 (eight) hours as needed for mild pain    Historical Provider, MD   albuterol (2 5 mg/3 mL) 0 083 % nebulizer solution TAKE 3ML BY NEBULIZER EVERY 6 HOURS AS  NEEDED FOR WHEEZING OR SHORTNESS OR BREATH 3/29/22   Historical Provider, MD   ALPRAZolam Marvie See) 0 5 mg tablet Take 1 tablet (0 5 mg total) by mouth daily at bedtime as needed for anxiety 8/1/22   Zahida Moon MD   aspirin (ECOTRIN LOW STRENGTH) 81 mg EC tablet Take 1 tablet (81 mg total) by mouth daily 8/1/22   Zahida Moon MD   atorvastatin (LIPITOR) 10 mg tablet Take 1 tablet (10 mg total) by mouth daily at bedtime 8/1/22   Zahida Moon MD   dimethicone 1 % cream Apply topically 2 (two) times a day as needed for dry skin 2/1/22   Zahida Moon MD   Docusate Sodium 100 MG capsule Take by mouth    Historical Provider, MD   donepezil (ARICEPT) 10 mg tablet Take 1 tablet (10 mg total) by mouth daily at bedtime 8/1/22   Zahida Moon MD   DULoxetine (CYMBALTA) 20 mg capsule Take 1 capsule (20 mg total) by mouth daily 8/1/22   Zahida Moon MD   gabapentin (NEURONTIN) 100 mg capsule Take 1 capsule (100 mg total) by mouth daily at bedtime 9/2/22   Georgie Sharma DO   Gauze Pads & Dressings (AMD Foam Dressing) 4"X4" PADS Use 2 (two) times a day 2/4/22   Zahida Moon MD   guaiFENesin (MUCINEX) 600 mg 12 hr tablet Take 1 tablet (600 mg total) by mouth every 12 (twelve) hours 6/2/22   Zahida Moon MD   Incontinence Supplies MISC Pt uses up to 3 packs of 100 wipes per week  Patient not taking: Reported on 9/16/2022 2/4/22   Bart Sauer MD   Incontinence Supply Disposable (Incontinence Brief Medium) MISC Pt uses 5 pullups daily as needed  Patient not taking: Reported on 9/16/2022 2/4/22   Bart Sauer MD   Incontinence Supply Disposable (Poise Maximum Absorbency) PADS Use 2 (two) times a day Size 6 long 5/13/22   Bart Sauer MD   Incontinence Supply Disposable MISC Pt uses 3 at night as needed 2/4/22   Bart Sauer MD   levothyroxine 88 mcg tablet Take 1 tablet (88 mcg total) by mouth daily 9/27/22   Bart Sauer MD   melatonin 1 mg Take 1 tablet (1 mg total) by mouth daily at bedtime 10/3/22   Bart Sauer MD   metoprolol tartrate (LOPRESSOR) 25 mg tablet Take 0 5 tablets (12 5 mg total) by mouth 2 (two) times a day 8/1/22   Bart Sauer MD   montelukast (SINGULAIR) 10 mg tablet Take 1 tablet (10 mg total) by mouth daily at bedtime 8/1/22   Bart Sauer MD   nitroglycerin (NITROSTAT) 0 4 mg SL tablet Place 0 4 mg under the tongue every 5 (five) minutes as needed for chest pain  Patient not taking: No sig reported    Historical Provider, MD   omeprazole (PriLOSEC) 20 mg delayed release capsule Take 1 capsule (20 mg total) by mouth daily 8/1/22   Bart Sauer MD   STARCH-MALTO DEXTRIN (Thick-It) POWD Take by mouth in the morning 9/16/22   Bart Sauer MD     MEDICATIONS ADMINISTERED IN LAST 24 HOURS     Medication Administration - last 24 hours from 10/06/2022 0030 to 10/07/2022 0030       Date/Time Order Dose Route Action Action by     10/06/2022 2209 ipratropium-albuterol (DUO-NEB) 0 5-2 5 mg/3 mL inhalation solution **ADS Override Pull** 3 mL  Given Bren Ramirez RN     10/06/2022 7944 cefepime (MAXIPIME) 2 g/50 mL dextrose IVPB 0 mg Intravenous Stopped Bren Ramirez RN     10/06/2022 2232 cefepime (MAXIPIME) 2 g/50 mL dextrose IVPB 2,000 mg Intravenous New Bag Bren Ramirez RN     10/06/2022 5382 vancomycin (VANCOCIN) IVPB (premix in dextrose) 750 mg 150 mL 0 mg/kg Intravenous Stopped Shantell Anging, RN     10/06/2022 2236 vancomycin (VANCOCIN) IVPB (premix in dextrose) 750 mg 150 mL 750 mg Intravenous New Bag Giovanialana Ashiaing, RN     10/07/2022 0024 donepezil (ARICEPT) tablet 10 mg 10 mg Oral Not [facility]     10/07/2022 0024 montelukast (SINGULAIR) tablet 10 mg 10 mg Oral Not Given inda Stalling, RN     10/07/2022 0024 atorvastatin (LIPITOR) tablet 10 mg 10 mg Oral Not Given [facility]     10/07/2022 0024 gabapentin (NEURONTIN) capsule 100 mg 100 mg Oral Not Given [facility]     10/07/2022 0025 melatonin tablet 1 mg 1 mg Oral Not Given [facility], RN        CURRENT MEDICATIONS     Current Facility-Administered Medications   Medication Dose Route Frequency Provider Last Rate   • aspirin  81 mg Oral Daily Bob Rodriguez MD     • atorvastatin  10 mg Oral HS Bob Rodriguez MD     • docusate sodium  50 mg Oral Daily PRN Bob Rodriguez MD     • donepezil  10 mg Oral HS Bob Rodriguez MD     • DULoxetine  20 mg Oral Daily Bob Rodriguez MD     • enoxaparin  40 mg Subcutaneous Daily Bob Rodriguez MD     • gabapentin  100 mg Oral HS Bob Rodriguez MD     • levothyroxine  88 mcg Oral Daily Bob Rodriguez MD     • melatonin  1 mg Oral HS Bob Rodriguez MD     • metoprolol tartrate  12 5 mg Oral BID Bob Rodriguez MD     • montelukast  10 mg Oral HS Bob Rodriguez MD     • pantoprazole  40 mg Oral Early Morning Bob Rodriguez MD          docusate sodium, 50 mg, Daily PRN        Admission Time  I spent 30 minutes admitting the patient  This involved direct patient contact where I performed a full history and physical, reviewing previous records, and reviewing laboratory and other diagnostic studies  Portions of the record may have been created with voice recognition software    Occasional wrong word or "sound a like" substitutions may have occurred due to the inherent limitations of voice recognition software    Read the chart carefully and recognize, using context, where substitutions have occurred     ==  [facility]  Internal Medicine Residency PGY-1

## 2022-10-07 NOTE — PROGRESS NOTES
INTERNAL MEDICINE RESIDENCY SENIOR ADMISSION NOTE     Name: Samuel Tucker   Age & Sex: 80 y o  female   MRN: [id number]  Unit/Bed#: B   Encounter: [id number]  Primary Care Provider: Kirby Darnell MD    Admit to team: SOD Team C     Patient seen and examined  Reviewed H&P per Dr Heaven Manzanares  Agree with the assessment and plan with any exception/addition as noted below:    80-year-old female with a history of dementia, COPD, T2DM, GERD, HTN, HLD presents to \[facility]\"" ED 10/6/22 for respiratory distress  Unable to obtain HPI per patient  Per daughter/chart review, patient has been increasingly more confused over the past day with new cough  EMS found the patient to be hypoxic in the 70's  Patient is bedbound and is only able perform self feeding  Relies on her granddaughter and healthcare aide for ADL's  Of note, patient was hospitalized recently 8/30 - 9/2 for CAP  Treated with cefepime, vancomycin initially in the ED and deescalated to Rocephin, discharged on cefuroxime  Patient was discharged with new home oxygen requirement of 2 L  ED course:  Vitals on arrival /88, SpO2 99% on 2 L, HR 90, RR 20, afebrile  Labs notable for WBC 10 7 with neutrophilic predominance, , albumin 2 3, corrected calcium 10 3  Procalcitonin negative and lactic acid negative  Chest x-ray shows left basilar infiltrate that is not significantly changed from previous one  Assessment & Plan:    1  Community-acquired pneumonia:  Evidenced by left basilar opacity on chest x-ray in the ED with leukocytosis and increasing oxygen requirements from baseline, now on 6 L  Negative COVID  She meets criteria for severe CAP with high risk for MDR organisms based on DRIP score >4    - vancomycin, cefepime, azithromycin  - follow-up a m  Procalcitonin  - monitor WBC count/fever curve  - monitor for evolving sepsis    2  COPD:  History of COPD  No PFTs to review on file    - continue home meds  - Zithromax for anti-inflammatory properties in the setting of pneumonia    Agree with rest of plan as documented in Dr Henry's H&P      Code Status: Prior  Admission Status: INPATIENT   Disposition: Patient requires Med/Surg  Expected Length of Stay: >2 midnights

## 2022-10-07 NOTE — OCCUPATIONAL THERAPY NOTE
OCCUPATIONAL THERAPY SCREEN:    ORDERS RECEIVED  CHART REVIEW COMPLETED  PT IS FROM HOME WITH 24 HOUR CARE FROM FAMILY AND CAREGIVERS  PT IS DEPENDENT FOR ADLS AND IS PRIMARILY BED/ WC BOUND AT BASELINE  PLAN TO RETURN WHEN MEDICALLY APPROPRIATE  NO ACUTE CARE OT NEEDS AT THIS TIME  D/C OT       Nikko Montero, MOT, OTR/L

## 2022-10-07 NOTE — QUICK NOTE
Patient switched from Cefepime and Zosyn for better Anaerobe coverage  Pulmonology consulted for evaluation and management in setting of recent admission for PNA in 08/2022

## 2022-10-07 NOTE — ED NOTES
Dr Yogesh Ceja at bedside for pt francis Underwood Class, [facility]  10/06/22 6695

## 2022-10-07 NOTE — ASSESSMENT & PLAN NOTE
Continue metoprolol tartrate 12 5 mg b i d    Overnight, patient had SBP elevated to 160s to 180s => lisinopril 10 mg daily added

## 2022-10-07 NOTE — ED ATTENDING ATTESTATION
10/6/2022  IWilian MD, saw and evaluated the patient  I have discussed the patient with the resident/non-physician practitioner and agree with the resident's/non-physician practitioner's findings, Plan of Care, and MDM as documented in the resident's/non-physician practitioner's note, except where noted  All available labs and Radiology studies were reviewed  I was present for key portions of any procedure(s) performed by the resident/non-physician practitioner and I was immediately available to provide assistance  At this point I agree with the current assessment done in the Emergency Department  I have conducted an independent evaluation of this patient a history and physical is as follows:    ED Course     41-year-old female presenting to the emergency department for evaluation of respiratory distress  Patient is unable to provide history  History from daughter states that the patient has been increasingly confused over the past day  Patient has been coughing  EMS found the patient to be hypoxic with an oxygen saturation of 70%  Patient is confused and unable to perform review of systems or provide further history of present illness  Elderly female lying recumbent in the stretcher  Head is normocephalic atraumatic  Eyelids lashes are normal   Neck is kyphotic  Lungs are diffusely rhonchorous with transmitted upper airway sounds in addition  Heart is regular rate rhythm with no murmurs rubs or gallops  Abdomen is nondistended, soft and nontender  Extremities without rash      Labs Reviewed   CBC AND DIFFERENTIAL - Abnormal       Result Value Ref Range Status    WBC 16 46 (*) 4 31 - 10 16 Thousand/uL Final    RBC 3 96  3 81 - 5 12 Million/uL Final    Hemoglobin 10 5 (*) 11 5 - 15 4 g/dL Final    Hematocrit 36 5  34 8 - 46 1 % Final    MCV 92  82 - 98 fL Final    MCH 26 5 (*) 26 8 - 34 3 pg Final    MCHC 28 8 (*) 31 4 - 37 4 g/dL Final    RDW 14 9  11 6 - 15 1 % Final    MPV 9 1 8 9 - 12 7 fL Final    Platelets 194  415 - 390 Thousands/uL Final    nRBC 0  /100 WBCs Final    Neutrophils Relative 83 (*) 43 - 75 % Final    Immat GRANS % 1  0 - 2 % Final    Lymphocytes Relative 10 (*) 14 - 44 % Final    Monocytes Relative 4  4 - 12 % Final    Eosinophils Relative 2  0 - 6 % Final    Basophils Relative 0  0 - 1 % Final    Neutrophils Absolute 13 79 (*) 1 85 - 7 62 Thousands/µL Final    Immature Grans Absolute 0 12  0 00 - 0 20 Thousand/uL Final    Lymphocytes Absolute 1 62  0 60 - 4 47 Thousands/µL Final    Monocytes Absolute 0 65  0 17 - 1 22 Thousand/µL Final    Eosinophils Absolute 0 25  0 00 - 0 61 Thousand/µL Final    Basophils Absolute 0 03  0 00 - 0 10 Thousands/µL Final   COMPREHENSIVE METABOLIC PANEL - Abnormal    Sodium 138  135 - 147 mmol/L Final    Potassium 4 0  3 5 - 5 3 mmol/L Final    Chloride 102  96 - 108 mmol/L Final    CO2 32  21 - 32 mmol/L Final    ANION GAP 4  4 - 13 mmol/L Final    BUN 22  5 - 25 mg/dL Final    Creatinine 0 47 (*) 0 60 - 1 30 mg/dL Final    Comment: Standardized to IDMS reference method    Glucose 146 (*) 65 - 140 mg/dL Final    Comment: If the patient is fasting, the ADA then defines impaired fasting glucose as > 100 mg/dL and diabetes as > or equal to 123 mg/dL  Specimen collection should occur prior to Sulfasalazine administration due to the potential for falsely depressed results  Specimen collection should occur prior to Sulfapyridine administration due to the potential for falsely elevated results  Calcium 8 9  8 3 - 10 1 mg/dL Final    Corrected Calcium 10 3 (*) 8 3 - 10 1 mg/dL Final    AST 32  5 - 45 U/L Final    Comment: Specimen collection should occur prior to Sulfasalazine administration due to the potential for falsely depressed results  ALT 20  12 - 78 U/L Final    Comment: Specimen collection should occur prior to Sulfasalazine and/or Sulfapyridine administration due to the potential for falsely depressed results       Alkaline Phosphatase 209 (*) 46 - 116 U/L Final    Total Protein 6 9  6 4 - 8 4 g/dL Final    Albumin 2 3 (*) 3 5 - 5 0 g/dL Final    Total Bilirubin 0 19 (*) 0 20 - 1 00 mg/dL Final    Comment: Use of this assay is not recommended for patients undergoing treatment with eltrombopag due to the potential for falsely elevated results  eGFR 86  ml/min/1 73sq m Final    Narrative:     National Kid[employer] guidelines for Chronic Kidney Disease (CKD):   •  Stage 1 with normal or high GFR (GFR > 90 mL/min/1 73 square meters)  •  Stage 2 Mild CKD (GFR = 60-89 mL/min/1 73 square meters)  •  Stage 3A Moderate CKD (GFR = 45-59 mL/min/1 73 square meters)  •  Stage 3B Moderate CKD (GFR = 30-44 mL/min/1 73 square meters)  •  Stage 4 Severe CKD (GFR = 15-29 mL/min/1 73 square meters)  •  Stage 5 End Stage CKD (GFR <15 mL/min/1 73 square meters)  Note: GFR calculation is accurate only with a steady state creatinine   CBC AND DIFFERENTIAL - Abnormal    WBC 24 00 (*) 4 31 - 10 16 Thousand/uL Final    RBC 3 68 (*) 3 81 - 5 12 Million/uL Final    Hemoglobin 10 2 (*) 11 5 - 15 4 g/dL Final    Hematocrit 34 0 (*) 34 8 - 46 1 % Final    MCV 92  82 - 98 fL Final    MCH 27 7  26 8 - 34 3 pg Final    MCHC 30 0 (*) 31 4 - 37 4 g/dL Final    RDW 15 1  11 6 - 15 1 % Final    MPV 9 1  8 9 - 12 7 fL Final    Platelets 971  324 - 390 Thousands/uL Final    nRBC 0  /100 WBCs Final    Comment: This is an appended report  These results have been appended to a previously preliminary verified report      Neutrophils Relative 90 (*) 43 - 75 % Final    Immat GRANS % 1  0 - 2 % Final    Lymphocytes Relative 4 (*) 14 - 44 % Final    Monocytes Relative 4  4 - 12 % Final    Eosinophils Relative 1  0 - 6 % Final    Basophils Relative 0  0 - 1 % Final    Neutrophils Absolute 21 70 (*) 1 85 - 7 62 Thousands/µL Final    Immature Grans Absolute 0 15  0 00 - 0 20 Thousand/uL Final    Lymphocytes Absolute 1 06  0 60 - 4 47 Thousands/µL Final    Monocytes Absolute 0 90  0 17 - 1 22 Thousand/µL Final    Eosinophils Absolute 0 14  0 00 - 0 61 Thousand/µL Final    Basophils Absolute 0 05  0 00 - 0 10 Thousands/µL Final    Narrative: This is an appended report  These results have been appended to a previously verified report  BASIC METABOLIC PANEL - Abnormal    Sodium 137  135 - 147 mmol/L Final    Potassium 5 0  3 5 - 5 3 mmol/L Final    Comment: Slightly Hemolyzed; Results May be Affected    Chloride 103  96 - 108 mmol/L Final    CO2 32  21 - 32 mmol/L Final    ANION GAP 2 (*) 4 - 13 mmol/L Final    BUN 21  5 - 25 mg/dL Final    Creatinine 0 41 (*) 0 60 - 1 30 mg/dL Final    Comment: Standardized to IDMS reference method    Glucose 116  65 - 140 mg/dL Final    Comment: If the patient is fasting, the ADA then defines impaired fasting glucose as > 100 mg/dL and diabetes as > or equal to 123 mg/dL  Specimen collection should occur prior to Sulfasalazine administration due to the potential for falsely depressed results  Specimen collection should occur prior to Sulfapyridine administration due to the potential for falsely elevated results      Calcium 8 7  8 3 - 10 1 mg/dL Final    eGFR 89  ml/min/1 73sq m Final    Narrative:     [place] guidelines for Chronic Kidney Disease (CKD):   •  Stage 1 with normal or high GFR (GFR > 90 mL/min/1 73 square meters)  •  Stage 2 Mild CKD (GFR = 60-89 mL/min/1 73 square meters)  •  Stage 3A Moderate CKD (GFR = 45-59 mL/min/1 73 square meters)  •  Stage 3B Moderate CKD (GFR = 30-44 mL/min/1 73 square meters)  •  Stage 4 Severe CKD (GFR = 15-29 mL/min/1 73 square meters)  •  Stage 5 End Stage CKD (GFR <15 mL/min/1 73 square meters)  Note: GFR calculation is accurate only with a steady state creatinine   NOVEL CORONAVIRUS (COVID-19), PCR [facility] - Normal    SARS-CoV-2 Negative  Negative Final    Comment:      Narrative:     FOR PEDIATRIC PATIENTS - copy/paste COVID Guidelines URL to browser: [email or web address]    SARS-CoV-2 assay is a Nucleic Acid Amplification assay intended for the  qualitative detection of nucleic acid from SARS-CoV-2 in nasopharyngeal  swabs  Results are for the presumptive identification of SARS-CoV-2 RNA  Positive results are indicative of infection with SARS-CoV-2, the virus  causing COVID-19, but do not rule out bacterial infection or co-infection  with other viruses  Laboratories within the [place] and its  territories are required to report all positive results to the appropriate  public health authorities  Negative results do not preclude SARS-CoV-2  infection and should not be used as the sole basis for treatment or other  patient management decisions  Negative results must be combined with  clinical observations, patient history, and epidemiological information  This test has not been FDA cleared or approved  This test has been authorized by FDA under an Emergency Use Authorization  (EUA)  This test is only authorized for the duration of time the  declaration that circumstances exist justifying the authorization of the  emergency use of an in vitro diagnostic tests for detection of SARS-CoV-2  virus and/or diagnosis of COVID-19 infection under section 564(b)(1) of  the Act, 21 U  S C  134UMU-4(H)(0), unless the authorization is terminated  or revoked sooner  The test has been validated but independent review by FDA  and CLIA is pending  Test performed using [employer] GeneXpert: This RT-PCR assay targets N2,  a region unique to SARS-CoV-2  A conserved region in the E-gene was chosen  for pan-Sarbecovirus detection which includes SARS-CoV-2  According to CMS-2020-01-R, this platform meets the definition of high-throughput technology  LACTIC ACID, PLASMA - Normal    LACTIC ACID 1 0  0 5 - 2 0 mmol/L Final    Narrative:     Result may be elevated if tourniquet was used during collection     PROCALCITONIN TEST - Normal    Procalcitonin 0 13  <=0 25 ng/ml Final    Comment: Suspected Lower Respiratory Tract Infection (LRTI):  - LESS than or EQUAL to 0 25 ng/mL:   low likelihood for bacterial LRTI; antibiotics DISCOURAGED   - GREATER than 0 25 ng/mL:   increased likelihood for bacterial LRTI; antibiotics ENCOURAGED  Suspected Sepsis:  - Strongly consider initiating antibiotics in ALL UNSTABLE patients  - LESS than or EQUAL to 0 5 ng/mL:   low likelihood for bacterial sepsis; antibiotics DISCOURAGED   - GREATER than 0 5 ng/mL:   increased likelihood for bacterial sepsis; antibiotics ENCOURAGED   - GREATER than 2 ng/mL:   high risk for severe sepsis / septic shock; antibiotics strongly ENCOURAGED  Decisions on antibiotic use should not be based solely on Procalcitonin (PCT) levels  If PCT is low but uncertainty exists with stopping antibiotics, repeat PCT in 6-24 hours to confirm the low level  If antibiotics are administered (regardless if initial PCT was high or low), repeat PCT every 1-2 days to consider early antibiotic cessation (when GREATER than 80% decrease from the peak OR when PCT drops below designated cutoffs, whichever comes first), so long as the infection is NOT one that typically requires prolonged treatment durations (e g , bone/joint infections, endocarditis, Staph  aureus bacteremia)      Situations of FALSE-POSITIVE Procalcitonin values:  1) Newborns < 67 hours old  2) Massive stress from severe trauma / burns, major surgery, acute pancreatitis, cardiogenic / hemorrhagic shock, sickle cell crisis, or other organ perfusion abnormalities  3) Malaria and some Candidal infections  4) Treatment with agents that stimulate cytokines (e g , OKT3, anti-lymphocyte globulins, alemtuzumab, IL-2, granulocyte transfusion [NOT GCSFs])  5) Chronic renal disease causes elevated baseline levels (consider GREATER than 0 75 ng/mL as an abnormal cut-off); initiating HD/CRRT may cause transient decreases  6) Paraneoplastic syndromes from medullary thyroid or SCLC, some forms of vasculitis, and acute [id number] disease    Situations of FALSE-NEGATIVE Procalcitonin values:  1) Too early in clinical course for PCT to have reached its peak (may repeat in 6-24 hours to confirm low level)  2) Localized infection WITHOUT systemic (SIRS / sepsis) response (e g , an abscess, osteomyelitis, cystitis)  3) Mycobacteria (e g , Tuberculosis, MAC)  4) Cystic fibrosis exacerbations     PROTIME-INR - Normal    Protime 12 9  11 6 - 14 5 seconds Final    INR 0 96  0 84 - 1 19 Final   APTT - Normal    PTT 27  23 - 37 seconds Final    Comment: Therapeutic Heparin Range =  60-90 seconds   PROCALCITONIN TEST - Normal    Procalcitonin 0 14  <=0 25 ng/ml Final    Comment: Suspected Lower Respiratory Tract Infection (LRTI):  - LESS than or EQUAL to 0 25 ng/mL:   low likelihood for bacterial LRTI; antibiotics DISCOURAGED   - GREATER than 0 25 ng/mL:   increased likelihood for bacterial LRTI; antibiotics ENCOURAGED  Suspected Sepsis:  - Strongly consider initiating antibiotics in ALL UNSTABLE patients  - LESS than or EQUAL to 0 5 ng/mL:   low likelihood for bacterial sepsis; antibiotics DISCOURAGED   - GREATER than 0 5 ng/mL:   increased likelihood for bacterial sepsis; antibiotics ENCOURAGED   - GREATER than 2 ng/mL:   high risk for severe sepsis / septic shock; antibiotics strongly ENCOURAGED  Decisions on antibiotic use should not be based solely on Procalcitonin (PCT) levels  If PCT is low but uncertainty exists with stopping antibiotics, repeat PCT in 6-24 hours to confirm the low level   If antibiotics are administered (regardless if initial PCT was high or low), repeat PCT every 1-2 days to consider early antibiotic cessation (when GREATER than 80% decrease from the peak OR when PCT drops below designated cutoffs, whichever comes first), so long as the infection is NOT one that typically requires prolonged treatment durations (e g , bone/joint infections, endocarditis, Staph  aureus bacteremia)  Situations of FALSE-POSITIVE Procalcitonin values:  1) Newborns < 67 hours old  2) Massive stress from severe trauma / burns, major surgery, acute pancreatitis, cardiogenic / hemorrhagic shock, sickle cell crisis, or other organ perfusion abnormalities  3) Malaria and some Candidal infections  4) Treatment with agents that stimulate cytokines (e g , OKT3, anti-lymphocyte globulins, alemtuzumab, IL-2, granulocyte transfusion [NOT GCSFs])  5) Chronic renal disease causes elevated baseline levels (consider GREATER than 0 75 ng/mL as an abnormal cut-off); initiating HD/CRRT may cause transient decreases  6) Paraneoplastic syndromes from medullary thyroid or SCLC, some forms of vasculitis, and acute ezvng-[id number]-mbfw disease    Situations of FALSE-NEGATIVE Procalcitonin values:  1) Too early in clinical course for PCT to have reached its peak (may repeat in 6-24 hours to confirm low level)  2) Localized infection WITHOUT systemic (SIRS / sepsis) response (e g , an abscess, osteomyelitis, cystitis)  3) Mycobacteria (e g , Tuberculosis, MAC)  4) Cystic fibrosis exacerbations     GGTP - Normal    GGT 38  5 - 85 U/L Final   BLOOD CULTURE    Blood Culture Received in Microbiology Lab  Culture in Progress  Preliminary   BLOOD CULTURE    Blood Culture Received in Microbiology Lab  Culture in Progress  Preliminary   MRSA CULTURE   LEGIONELLA ANTIGEN, URINE   STREP PNEUMONIAE ANTIGEN,URINE       XR chest portable   ED Interpretation   Left basilar infiltrate  Chest x-ray is not significantly changed from last chest x-ray  Final Result      Persistent increase in interstitial markings which could be due to fibrosis and/or edema  Persistent left base opacity, atelectasis and/or pneumonia in the appropriate clinical setting                    Workstation performed: [id number]                 Critical Care Time  Procedures

## 2022-10-07 NOTE — ASSESSMENT & PLAN NOTE
Hx of COPD on Singular and bronchodilators  - continue home montelukast  - per pulmonology recommendations, started Xopenex b i d

## 2022-10-07 NOTE — SPEECH THERAPY NOTE
Bedside Swallow Evaluation:    Summary:  Pt presented w/ mild oropharyngeal dysphagia  Patient is known to this ST from previous admission  Baseline diet is puree/mechanical soft with nectar thick liquids  Patient is assessed with puree solids and honey thick, nectar thick liquids  Oral closure is adequate  Transfer time is intermittently delayed with suspected decreased oral control  Also suspect swallow initiation time is min delayed  The patient has cough response to tsp of nectar thick liquids  Otherwise, tolerates items without s/s aspiration  Recommendations:  Diet: puree  Liquid: honey   Meds: crush in puree   Supervision: feed patient   Positioning:Upright  Strategies: TSP only (or sippy cup from home)   Pt to take PO/Meds only when fully alert and upright  Oral care: frequent     Aspiration precautions  Therapy Prognosis: fair  Prognosis considerations: age; history of dysphagia   Frequency: 3-5x week, as able     Consider consult w/:  Nutrition    Goal(s):  Pt will tolerate least restrictive diet w/out s/s aspiration or oral/pharyngeal difficulties  H&P/Admit info/ pertinent provider notes: (PMH noted above)  Dionicio Aleman is a 20-year-old woman with medical history significant for COPD, pulmonary fibrosis, diabetes, hypertension, hyperlipidemia, prior stroke, presenting with an episode of hypoxia at home, cough, increased oral secretions presenting with her daughter who provides the history  Her daughter noted increased oral secretions, cough, congestion throughout the day today  EMS was called this evening due to increasing respiratory distress  EMS found her to be 70% SpO2, suctioned her restfully, and placed on a non-rebreather  Her oxygen saturations improved into the 80s after suctioning with non-rebreather  Daughter reports that she has had no fevers, chills, nausea, vomiting  Daughter who is at bedside reports that her mother is not at her baseline mental status    She is more confused than usual   She reports that her mother was in her normal state of health yesterday  Special Studies:  Chest xray 10/6/2022:   Persistent increase in interstitial markings which could be due to fibrosis and/or edema    Persistent left base opacity, atelectasis and/or pneumonia in the appropriate clinical setting  Previous VBS:  8/31/2022:   Summary:  Images are on PACS for review  Oral stage: The patient presents with moderate oral dysphagia characterized by decreased strength to draw from straw, prolonged a-p transfer time with decreased bolus propulsion and decreased bolus control with occasional spillage over BOT  The patient is observed with oral holding at times  This leads to decreased control and eventual aspiration events (especially with thin liquids)  Pharyngeal stage: The patient presents with a moderate pharyngeal dysphagia characterized by delayed swallow initiation time with all, with spillage to the valleculae with all and to the pyriforms with nectar thick liquids  Silent aspiration observed x1 with nectar thick liquids via straw, but no aspiration events observed when patient takes nectar thick liquids via personal sippy cup  The patient has reduced control and reduced airway protection with thin liquids with resulting aspiration events  The patient is observed with significant cough response with watery eyes and reddened face  Poor coordination is observed when taking barium tablet with thin liquids with resulting aspiration  Per gross esophageal screen:  Some retention observed     Patient's goal: none stated     Did the pt report pain? no  If yes, was nursing notified/was it addressed?     Reason for consult:  R/o aspiration  Determine safest and least restrictive diet  respiratory compromise  current pna  h/o dysphagia     Precautions:  Aspiration  Fall    Food allergies:  None   Current diet:  NPO  Premorbid diet[de-identified]  Puree/mechanical soft with nectar thick liquids   O2 requirement:  6 L NC  Voice/Speech:  Clear voice-difficult to understand speech   Social/Prior living environment:  Lives with family/has caregiver   Follows commands:  wfl                        Cognitive Status:  Lethargic   Oral mech exam:  Dentition: edentulous   Not formally assessed     Items administered:  Puree, honey thick liquid, nectar thick liquid   Liquids were taken by tsp     Oral stage:  Lip closure: wfl  Mastication: n/a  Bolus formation: wfl  Bolus control: suspect min reduced  Transfer: min prolonged  Oral residue: no  Pocketing: no    Pharyngeal stage:  Swallow promptness: suspect delayed  Laryngeal rise: wfl  Wet voice: no  Throat clear: no  Cough: with nectar thick liquids   Secondary swallows: no  Audible swallows: no    Esophageal stage:  No s/s reported    Aspiration precautions posted    Results d/w:  Pt, nursing, physician

## 2022-10-07 NOTE — PROGRESS NOTES
Vancomycin IV Pharmacy-to-Dose Consultation    Marquis Perez is a 80 y o  female who is currently receiving Vancomycin IV with management by the Pharmacy Consult service for treatment of CAP vs aspiration pneumonia  Pulmonology is consulted  Assessment/Plan:  The patient was reviewed  Renal function is stable and no signs or symptoms of nephrotoxicity and/or infusion reactions were documented in the chart  Provider team would like to continue vancomycin due to concern for MDR's  MRSA swab pending  Based on today’s assessment, continue current vancomycin (day # 1) dosing of 500 mg every 12 hours, with a plan for trough to be drawn at 1000 on 10/8  We will continue to follow the patient’s culture results and clinical progress daily      Petros Browning, TundeD

## 2022-10-08 NOTE — PHYSICAL THERAPY NOTE
PT orders received  Chart reviewed  Per notes patient is dependent at baseline and has caregiver at all times   Will D/C from PT   Shelly Later, PT, DPT     10/08/22 [id number]   PT Last Visit   PT Visit Date 10/08/22   Note Type   Note type Screen

## 2022-10-08 NOTE — PROGRESS NOTES
INTERNAL MEDICINE RESIDENCY PROGRESS NOTE     Name: Megan Roche   Age & Sex: 80 y o  female   MRN: [id number]  Unit/Bed#: [facility] 302-01   Encounter: [id number]  Team: SOD Team C     PATIENT INFORMATION     Name: Megan Roche   Age & Sex: 80 y o  female   MRN: [id number]  Hospital Stay Days: 2    ASSESSMENT/PLAN     Principal Problem:    Acute on chronic respiratory failure with hypoxia [facility])  Active Problems:    Left upper lobe pneumonia    CAD (coronary artery disease), native coronary artery    Dementia due to medical condition without behavioral disturbance (HCC)    COPD, severity to be determined ([facility] [address] )    Hypertension      * Acute on chronic respiratory failure with hypoxia [facility])  Assessment & Plan  Patient w/ hx of dementia and COPD (no PFTs) was recently admitted in August for PNA and was sent home on 2L O2  Now presenting with cough and decreased mentation  At baseline is AAOx0  On presentation, requiring 15 L O2 on non-rebreather and has been transitioned down to 6L O2 NC and is satting in high 90s  Tachycardic to 120s  Afebrile (Tmax 100F) Pulmonary exam significant for diffuse rhonchi  Leukocytosis to 24 w/ negative procal and lactate  CXR showed left basilar infiltrate stable from previous admission  Presentation is likely 2/2 Pneumonia vs Aspiration Pneumonitis vs less likely COPD exacerbation    Plan:  -apparently on 8 L O2 and satting at 94-96% while sleeping  - pulmonology consult => discontinue azithromycin and continue with bronchodilators  - MRSA negative => will discontinue vancomycin  - Currently on Zosyn => Continue until cultures and sensitivities result  -Continue levalbuterol, sodium chloride nebs and montelukast    Hypertension  Assessment & Plan  Continue metoprolol tartrate 12 5 mg b i d     COPD, severity to be determined [facility])  Assessment & Plan  Hx of COPD on Singular and bronchodilators     - Will continue home medications   - continue leave albuterol and sodium chloride nebs      Dementia due to medical condition without behavioral disturbance [facility])  Assessment & Plan  Continue home regimen of alprazolam, donepezil, duloxetine, melatonin, gabapentin  Per family, patient is occasionally tried to get out of bed at night       Plan:  · Family has had to use soft restraints occasionally at home  · Will defer soft restraints at this time  · If attempts to redirect and virtual continuous observation fail, can consider soft restraints    CAD (coronary artery disease), native coronary artery  Assessment & Plan  Continue aspirin, statin    Left upper lobe pneumonia  Assessment & Plan  Noted to have left basilar opacity similar to previous with increasing o2 requirement  Leukocytosis of 16, negative procal  Patient initially started on vanc/cefepime in the ED  Currently requiring 6L (baseline of 2L at home)   - Concern for potential gram-negative pathogens (I e  Pseudomonas) given recent infection and Anaerobic pathogens in s/o potential aspiration  - has remained afebrile and white count downtrending to 15 from 24 yesterday  - blood cultures negative at 24 hours; MRSA negative; procalcitonin negative x2; no growth on sputum cultures  - azithromycin discontinued per pulmonology recommendations  - will discontinue vancomycin and continue with Zosyn  - Monitor WBC/fever curve   - Monitor O2 saturations > 90%       Disposition:  Pending medical stabilization    SUBJECTIVE     Yesterday, switch from cefepime to Zosyn and IV consulted  No acute events overnight  Morning, patient is was seen sleeping  She was on 3 L of O2 satting at around 96%  Per daughter at bedside, patient was coughing last when she 1st came in  Her mentation also appears to be slowly improving  ROS limited due to mental status     OBJECTIVE     Vitals:    10/08/22 0700 10/08/22 0837 10/08/22 1100 10/08/22 1342   BP: 163/80      BP Location:       Pulse: 89      Resp: 17      Temp: 97 6 °F (36 4 °C)      TempSrc: SpO2: 94% 96% 97% 94%      Temperature:   Temp (24hrs), Av 3 °F (36 8 °C), Min:97 6 °F (36 4 °C), Max:98 9 °F (37 2 °C)    Temperature: 97 6 °F (36 4 °C)  Intake & Output:  I/O       10/06 0701  10/07 0700 10/07 0701  10/08 0700    IV Piggyback 400     Total Intake 400     Net +400           Unmeasured Urine Occurrence  1 x        Weights: There is no height or weight on file to calculate BMI  Weight (last 2 days)     None        Physical Exam  Constitutional:       General: She is not in acute distress  Appearance: She is ill-appearing  HENT:      Head: Normocephalic and atraumatic  Neck:      Comments: Lipoma present on posterior  Cardiovascular:      Rate and Rhythm: Normal rate and regular rhythm  Heart sounds: No murmur heard  No friction rub  No gallop  Pulmonary:      Effort: Pulmonary effort is normal  No respiratory distress  Breath sounds: Rhonchi present  No wheezing or rales  Comments: Diffuse rhonchi appreciated  Abdominal:      General: Abdomen is flat  There is no distension  Palpations: Abdomen is soft  Tenderness: There is no abdominal tenderness  Musculoskeletal:      Right lower leg: No edema  Left lower leg: No edema  Skin:     General: Skin is warm and dry  Neurological:      Comments: Sleeping       LABORATORY DATA     Labs: I have personally reviewed pertinent reports    Results from last 7 days   Lab Units 10/08/22  0952 10/07/22  0432 10/06/22  2205   WBC Thousand/uL 15 72* 24 00* 16 46*   HEMOGLOBIN g/dL 9 7* 10 2* 10 5*   HEMATOCRIT % 32 8* 34 0* 36 5   PLATELETS Thousands/uL 278 290 334   NEUTROS PCT % 80* 90* 83*   MONOS PCT % 5 4 4      Results from last 7 days   Lab Units 10/08/22  0952 10/07/22  0432 10/06/22  2205   POTASSIUM mmol/L 3 5 5 0 4 0   CHLORIDE mmol/L 104 103 102   CO2 mmol/L 30 32 32   BUN mg/dL 13 21 22   CREATININE mg/dL 0 47* 0 41* 0 47*   CALCIUM mg/dL 8 4 8 7 8 9   ALK PHOS U/L  --   --  209*   ALT U/L  -- --  20   AST U/L  --   --  32              Results from last 7 days   Lab Units 10/06/22  2205   INR  0 96   PTT seconds 27     Results from last 7 days   Lab Units 10/06/22  2205   LACTIC ACID mmol/L 1 0           IMAGING & DIAGNOSTIC TESTING     Radiology Results: I have personally reviewed pertinent reports  XR chest portable    Result Date: 10/7/2022  Impression: Persistent increase in interstitial markings which could be due to fibrosis and/or edema  Persistent left base opacity, atelectasis and/or pneumonia in the appropriate clinical setting  Workstation performed: [id number]     Other Diagnostic Testing: I have personally reviewed pertinent reports      ACTIVE MEDICATIONS     Current Facility-Administered Medications   Medication Dose Route Frequency   • aspirin (ECOTRIN LOW STRENGTH) EC tablet 81 mg  81 mg Oral Daily   • atorvastatin (LIPITOR) tablet 10 mg  10 mg Oral HS   • docusate sodium (COLACE) capsule 50 mg  50 mg Oral Daily PRN   • donepezil (ARICEPT) tablet 10 mg  10 mg Oral HS   • DULoxetine (CYMBALTA) delayed release capsule 20 mg  20 mg Oral Daily   • enoxaparin (LOVENOX) subcutaneous injection 40 mg  40 mg Subcutaneous Daily   • gabapentin (NEURONTIN) capsule 100 mg  100 mg Oral HS   • levalbuterol (XOPENEX) inhalation solution 1 25 mg  1 25 mg Nebulization TID   • levothyroxine tablet 88 mcg  88 mcg Oral Early Morning   • melatonin tablet 3 mg  3 mg Oral HS   • metoprolol tartrate (LOPRESSOR) partial tablet 12 5 mg  12 5 mg Oral Q12H CHERYL   • montelukast (SINGULAIR) tablet 10 mg  10 mg Oral HS   • pantoprazole (PROTONIX) EC tablet 40 mg  40 mg Oral Early Morning   • piperacillin-tazobactam (ZOSYN) 4 5 g in sodium chloride 0 9 % 100 mL IVPB  4 5 g Intravenous Q6H   • sodium chloride 0 9 % infusion  75 mL/hr Intravenous Continuous   • sodium chloride 0 9 % inhalation solution 3 mL  3 mL Nebulization TID       VTE Pharmacologic Prophylaxis:  Lovenox  VTE Mechanical Prophylaxis: SCD    Portions of the record may have been created with voice recognition software  Occasional wrong word or "sound a like" substitutions may have occurred due to the inherent limitations of voice recognition software    Read the chart carefully and recognize, using context, where substitutions have occurred   ==  [facility]  Internal Medicine Residency PGY-1

## 2022-10-08 NOTE — CONSULTS
Vancomycin IV Pharmacy-to-Dose Consultation    Westley Anaya is a 80 y o  female who was receiving Vancomycin IV with management by the Pharmacy Consult service for treatment of Pneumonia  The patient’s Vancomycin therapy has been discontinued  Thank you for allowing us to take part in this patient's care  Pharmacy will sign-off now; please call or re-consult if there are any questions          Ned Aschoff, PharmD, [address] Clinical Pharmacist  [phone]

## 2022-10-08 NOTE — PLAN OF CARE
Problem: Potential for Falls  Goal: Patient will remain free of falls  Description: INTERVENTIONS:  - Educate patient/family on patient safety including physical limitations  - Instruct patient to call for assistance with activity   - Consult OT/PT to assist with strengthening/mobility   - Keep Call bell within reach  - Keep bed low and locked with side rails adjusted as appropriate  - Keep care items and personal belongings within reach  - Initiate and maintain comfort rounds  - Consider moving patient to room near nurses station  Outcome: Progressing     Problem: MOBILITY - ADULT  Goal: Maintain or return to baseline ADL function  Description: INTERVENTIONS:  -  Assess patient's ability to carry out ADLs; assess patient's baseline for ADL function and identify physical deficits which impact ability to perform ADLs (bathing, care of mouth/teeth, toileting, grooming, dressing, etc )  - Assess/evaluate cause of self-care deficits   - Assess range of motion  - Assess patient's mobility; develop plan if impaired  - Assess patient's need for assistive devices and provide as appropriate  - Encourage maximum independence but intervene and supervise when necessary  - Involve family in performance of ADLs  - Assess for home care needs following discharge   - Consider OT consult to assist with ADL evaluation and planning for discharge  - Provide patient education as appropriate  Outcome: Progressing  Goal: Maintains/Returns to pre admission functional level  Description: INTERVENTIONS:  - Perform BMAT or MOVE assessment daily    - Set and communicate daily mobility goal to care team and patient/family/caregiver  - Collaborate with rehabilitation services on mobility goals if consulted  - Perform Range of Motion 2 times a day  - Reposition patient every 2 hours    - Out of bed for toileting  - Record patient progress and toleration of activity level   Outcome: Progressing

## 2022-10-09 PROBLEM — D64.9 ANEMIA: Status: ACTIVE | Noted: 2022-01-01

## 2022-10-09 NOTE — ASSESSMENT & PLAN NOTE
Patient with a hemoglobin 8 6 this morning, down from 10 5 on admission  Possibly dilutional in the setting of 75 cc/h crystalloid infusion, however will closely monitor  Per patient's granddaughter, a fecal occult blood test was done recently in the outpatient setting and was negative      Plan:  · Folate 8 3, B12 172, iron sat 17, TIBC 206, iron 34, ferritin 65, fecal occult negative x1  · Anemia likely secondary to combination of anemia chronic disease and dilutional

## 2022-10-09 NOTE — PROGRESS NOTES
INTERNAL MEDICINE RESIDENCY PROGRESS NOTE     Name: Tutu Wayne   Age & Sex: 80 y o  female   MRN: [id number]  Unit/Bed#: [facility] 302-01   Encounter: [id number]  Team: SOD Team C     PATIENT INFORMATION     Name: Tutu Wayne   Age & Sex: 80 y o  female   MRN: [id number]  Hospital Stay Days: 3    ASSESSMENT/PLAN     Principal Problem:    Acute on chronic respiratory failure with hypoxia ([facility] [address] )  Active Problems:    CAD (coronary artery disease), native coronary artery    COPD, severity to be determined ([facility] [address] )    Hypertension    Dementia due to medical condition without behavioral disturbance ([facility] [address] )    Left upper lobe pneumonia      Left upper lobe pneumonia  Assessment & Plan  Noted to have left basilar opacity similar to previous with increasing o2 requirement  Leukocytosis of 16, negative procal  Patient initially started on vanc/cefepime in the ED  Currently requiring 6L (baseline of 2L at home)   - Concern for potential gram-negative pathogens (I e  Pseudomonas) given recent infection and Anaerobic pathogens in s/o potential aspiration  - has remained afebrile and white count downtrending to 15 from 24 yesterday  - blood cultures negative at 24 hours; MRSA negative; procalcitonin negative x2; no growth on sputum cultures  - azithromycin discontinued per pulmonology recommendations  - will discontinue vancomycin and continue with Zosyn  - Monitor WBC/fever curve   - Monitor O2 saturations > 90%     * Acute on chronic respiratory failure with hypoxia [facility])  Assessment & Plan  Patient w/ hx of dementia and COPD (no PFTs) was recently admitted in August for PNA and was sent home on 2L O2  Now presenting with cough and decreased mentation  At baseline is AAOx0  On presentation, requiring 15 L O2 on non-rebreather and has been transitioned down to 6L O2 NC and is satting in high 90s  Tachycardic to 120s  Afebrile (Tmax 100F) Pulmonary exam significant for diffuse rhonchi   Leukocytosis to 24 w/ negative procal and lactate  CXR showed left basilar infiltrate stable from previous admission  Presentation is likely 2/2 Pneumonia vs Aspiration Pneumonitis vs less likely COPD exacerbation    Plan:  - currently on 4 L NC satting mid 90s, titrate to maintain pulse oxygen levels above 88%  - pulmonology consult => discontinue azithromycin and continue with bronchodilators  - MRSA negative => will discontinue vancomycin  - Currently on Zosyn => Continue   - Continue levalbuterol, sodium chloride nebs and montelukast    Anemia  Assessment & Plan  Patient with a hemoglobin 8 6 this morning, down from 10 5 on admission  Possibly dilutional in the setting of 75 cc/h crystalloid infusion, however will closely monitor  Per patient's granddaughter, a fecal occult blood test was done recently in the outpatient setting and was negative  Plan:  · No recent iron studies  Will get iron studies here in addition to B12 and folate  · Reduce infusion to 50 cc/Hr  · Fecal occult blood test ordered for when patient has a bowel movement  · CBC tomorrow for continued monitoring      CAD (coronary artery disease), native coronary artery  Assessment & Plan  Continue aspirin, statin    Hypertension  Assessment & Plan  Continue metoprolol tartrate 12 5 mg b i d     COPD, severity to be determined [facility])  Assessment & Plan  Hx of COPD on Singular and bronchodilators  - Will continue home medications   - continue leave albuterol and sodium chloride nebs      Dementia due to medical condition without behavioral disturbance [facility])  Assessment & Plan  Continue home regimen of alprazolam, donepezil, duloxetine, melatonin, gabapentin  Per family, patient is occasionally tried to get out of bed at night       Plan:  · Family has had to use soft restraints occasionally at home  · Will defer soft restraints at this time     · If attempts to redirect and virtual continuous observation fail, can consider soft restraints      Disposition:  Continue inpatient care    SUBJECTIVE     Patient seen and examined  No acute events overnight  Patient resting comfortably in bed  Arousable, A&O x2     Patient denies any fever or chills, sore throat, shortness of breath cough or wheeze, chest pain or heart palpitations, abdominal pain, nausea vomiting diarrhea or constipation, extremity pain or swelling  OBJECTIVE     Vitals:    10/08/22 1342 10/08/22 2001 10/08/22 2150 10/08/22 2201   BP:   (!) 187/95 (!) 181/96   BP Location:    Left arm   Pulse:   103 105   Resp:    20   Temp:    99 5 °F (37 5 °C)   TempSrc:    Oral   SpO2: 94% 99%  100%      Temperature:   Temp (24hrs), Av 6 °F (37 °C), Min:97 6 °F (36 4 °C), Max:99 5 °F (37 5 °C)    Temperature: 99 5 °F (37 5 °C)  Intake & Output:  I/O       10/07 0701  10/08 0700 10/08 0701  10/09 0700    Urine  450    Total Output  450    Net  -450          Unmeasured Urine Occurrence 1 x         Weights: There is no height or weight on file to calculate BMI  Weight (last 2 days)     None        Physical Exam  Vitals and nursing note reviewed  Constitutional:       General: She is not in acute distress  Appearance: Normal appearance  She is well-developed  She is ill-appearing  She is not toxic-appearing  Comments: Cachectic   HENT:      Head: Normocephalic and atraumatic  Right Ear: External ear normal       Left Ear: External ear normal       Mouth/Throat:      Mouth: Mucous membranes are moist    Eyes:      Extraocular Movements: Extraocular movements intact  Conjunctiva/sclera: Conjunctivae normal       Pupils: Pupils are equal, round, and reactive to light  Cardiovascular:      Rate and Rhythm: Normal rate and regular rhythm  Pulses: Normal pulses  Heart sounds: Normal heart sounds  No murmur heard  No friction rub  No gallop  Pulmonary:      Effort: Pulmonary effort is normal  No respiratory distress  Breath sounds: Rhonchi present  No wheezing or rales        Comments: 4 L NC  Abdominal:      General: Bowel sounds are normal  There is no distension  Palpations: Abdomen is soft  Tenderness: There is no abdominal tenderness  There is no guarding  Hernia: No hernia is present  Musculoskeletal:         General: No swelling or deformity  Cervical back: Neck supple  Right lower leg: No edema  Left lower leg: No edema  Skin:     General: Skin is warm and dry  Coloration: Skin is not jaundiced  Findings: No bruising, lesion or rash  Neurological:      General: No focal deficit present  Mental Status: She is alert  Comments: A&O x2       LABORATORY DATA     Labs: I have personally reviewed pertinent reports  Results from last 7 days   Lab Units 10/09/22  0522 10/08/22  0952 10/07/22  0432   WBC Thousand/uL 11 67* 15 72* 24 00*   HEMOGLOBIN g/dL 8 6* 9 7* 10 2*   HEMATOCRIT % 29 7* 32 8* 34 0*   PLATELETS Thousands/uL 270 278 290   NEUTROS PCT % 78* 80* 90*   MONOS PCT % 6 5 4      Results from last 7 days   Lab Units 10/08/22  0952 10/07/22  0432 10/06/22  2205   POTASSIUM mmol/L 3 5 5 0 4 0   CHLORIDE mmol/L 104 103 102   CO2 mmol/L 30 32 32   BUN mg/dL 13 21 22   CREATININE mg/dL 0 47* 0 41* 0 47*   CALCIUM mg/dL 8 4 8 7 8 9   ALK PHOS U/L  --   --  209*   ALT U/L  --   --  20   AST U/L  --   --  32              Results from last 7 days   Lab Units 10/06/22  2205   INR  0 96   PTT seconds 27     Results from last 7 days   Lab Units 10/06/22  2205   LACTIC ACID mmol/L 1 0           IMAGING & DIAGNOSTIC TESTING     Radiology Results: I have personally reviewed pertinent reports  XR chest portable    Result Date: 10/7/2022  Impression: Persistent increase in interstitial markings which could be due to fibrosis and/or edema  Persistent left base opacity, atelectasis and/or pneumonia in the appropriate clinical setting  Workstation performed: [id number]     Other Diagnostic Testing: I have personally reviewed pertinent reports      ACTIVE MEDICATIONS     Current Facility-Administered Medications   Medication Dose Route Frequency   • aspirin (ECOTRIN LOW STRENGTH) EC tablet 81 mg  81 mg Oral Daily   • atorvastatin (LIPITOR) tablet 10 mg  10 mg Oral HS   • docusate sodium (COLACE) capsule 50 mg  50 mg Oral Daily PRN   • donepezil (ARICEPT) tablet 10 mg  10 mg Oral HS   • DULoxetine (CYMBALTA) delayed release capsule 20 mg  20 mg Oral Daily   • enoxaparin (LOVENOX) subcutaneous injection 40 mg  40 mg Subcutaneous Daily   • gabapentin (NEURONTIN) capsule 100 mg  100 mg Oral HS   • levalbuterol (XOPENEX) inhalation solution 1 25 mg  1 25 mg Nebulization TID   • levothyroxine tablet 88 mcg  88 mcg Oral Early Morning   • melatonin tablet 3 mg  3 mg Oral HS   • metoprolol tartrate (LOPRESSOR) partial tablet 12 5 mg  12 5 mg Oral Q12H CHERYL   • montelukast (SINGULAIR) tablet 10 mg  10 mg Oral HS   • pantoprazole (PROTONIX) EC tablet 40 mg  40 mg Oral Early Morning   • piperacillin-tazobactam (ZOSYN) 4 5 g in sodium chloride 0 9 % 100 mL IVPB  4 5 g Intravenous Q6H   • sodium chloride 0 9 % infusion  75 mL/hr Intravenous Continuous   • sodium chloride 0 9 % inhalation solution 3 mL  3 mL Nebulization TID       VTE Pharmacologic Prophylaxis: Enoxaparin (Lovenox)  VTE Mechanical Prophylaxis: sequential compression device    Portions of the record may have been created with voice recognition software  Occasional wrong word or "sound a like" substitutions may have occurred due to the inherent limitations of voice recognition software    Read the chart carefully and recognize, using context, where substitutions have occurred   ==  [address] [place]  Internal Medicine Residency PGY-2

## 2022-10-09 NOTE — CASE MANAGEMENT
Case Management Assessment & Discharge Planning Note    Patient name Zehra June  Location [address] 302/PPHP 969-70 MRN [id number]  : 10/31/1929 Date 10/9/2022       Current Admission Date: 10/6/2022  Current Admission Diagnosis:Acute on chronic respiratory failure with hypoxia [facility])   Patient Active Problem List    Diagnosis Date Noted   • Acute on chronic respiratory failure with hypoxia ([facility] ) 10/07/2022   • Acute cystitis without hematuria 2022   • Underweight 2449   • Metabolic encephalopathy    • Pneumonia 2022   • Medicare annual wellness visit, subsequent 2022   • Foot pain, bilateral 2022   • COPD, severity to be determined ([place] [address] ) 2021   • Hypertension    • Pulmonary fibrosis ([place] [address] ) 2018   • Left upper lobe pneumonia 2018   • Intermittent asthma 2018   • Abdominal aortic aneurysm (AAA) 3 0 cm to 5 0 cm in diameter in female 2018   • Toxic metabolic encephalopathy    • Squamous cell carcinoma of right thigh 2014   • Hypothyroidism 2012   • Type 2 diabetes mellitus with hemoglobin A1c goal of less than 7 0% ([facility] ) 10/19/2011   • Dyslipidemia, goal LDL below 70 2011   • Mononeuritis of lower limb 2011   • CAD (coronary artery disease), native coronary artery 2011   • Dementia due to medical condition without behavioral disturbance ([facility] ) 2011   • Peripheral vascular disease ([place] [address] ) 2011      LOS (days): 3  Geometric Mean LOS (GMLOS) (days): 3 40  Days to GMLOS:0 9     OBJECTIVE:    Risk of Unplanned Readmission Score: 15 75         Current admission status: Inpatient       Preferred Pharmacy:   [address],  [address] [place] [address] [address]   Phone: [phone] Fax: Pauline Degroot 14, [address] [facility] [address] [address]  Phone: [phone] Fax: [address] [facility] [address] [address] [place] [address]  Phone: [phone] Fax: [phone]    Primary Care Provider: Tanna Thomason MD    Primary Insurance: MEDICARE  Secondary Insurance: [address]    ASSESSMENT:  [address], [address] [facility],  [address] Representative - Son   Primary Phone: [phone] (Home)               Advance Directives  Does patient have a [address]?: No  Was patient offered paperwork?: No (patient has dementia)  Does patient currently have a Health Care decision maker?: Yes, please see Health Care Proxy section  Does patient have Advance Directives?: No  Was patient offered paperwork?: No (patient has dementia)  Primary Contact: Yessy Langston-daughter    Readmission Root Cause  30 Day Readmission: No    Patient Information  Admitted from[de-identified] Home  Mental Status: Alert, Confused  During Assessment patient was accompanied by: Daughter, Other-Comment (granddaughter, Jenny King)  Assessment information provided by[de-identified] Daughter, Other - please comment (granddaughter-Kate)  Primary Caregiver: Other (Comment) (attendant care through [place] [id number])  Caregiver's Name[de-identified] Seda Sees Relationship to Patient[de-identified] Family Member  Caregiver's Telephone Number[de-identified] [phone]  Support Systems: Daughter, Home care staff, Other (Comment) (granddaughter, Jenny King)  [place] of Residence: [address] do you live in?: [facility] entry access options   Select all that apply : Ramp  Type of Current Residence: Apartment  Floor Level: 1  Upon entering residence, is there a bedroom on the main floor (no further steps)?: Yes  Upon entering residence, is there a bathroom on the main floor (no further steps)?: Yes  In the last 12 months, was there a time when you were not able to pay the mortgage or rent on time?: No  In the last 12 months, how many places have you lived?: 1  In the last 12 months, was there a time when you did not have a steady place to sleep or slept in a shelter (including now)?: No  Homeless/housing insecurity resource given?: N/A  Living Arrangements: Lives w/ Extended Family (lives with granddaughter, Elizabeth Solis and her son  Daughter, Gianna Pride, lives in apartment upstairs )    Activities of Daily Living Prior to Admission  Functional Status: Total dependent  Completes ADLs independently?: No  Level of ADL dependence: Total Dependent  Ambulates independently?: No  Level of ambulatory dependence: Total Dependent  Does patient use assisted devices?: Yes  Assisted Devices (DME) used: Bedside Commode, Wheelchair, Home Oxygen concentrator, Portable Oxygen concentrator, Hospital Bed  DME Company Name (respiratory supplies):  [employer]  O2 Rate(s): 2 L  Does patient currently own DME?: Yes  What DME does the patient currently own?: Portable Oxygen concentrator, [facility]  [facility], Wheelchair  Does patient have a history of Outpatient Therapy (PT/OT)?: No  Does the patient have a history of Short-Term Rehab?: No  Does patient have a history of HHC?: No  Does patient currently have [employer] ?: No    Patient Information Continued  Income Source: Pension/skilled nursing  Does patient have prescription coverage?: Yes  Within the past 12 months, you worried that your food would run out before you got the money to buy more : Never true  Within the past 12 months, the food you bought just didn't last and you didn't have money to get more : Never true  Food insecurity resource given?: N/A  Does patient receive dialysis treatments?: No  Does patient have a history of substance abuse?: No  Does patient have a history of Mental Health Diagnosis?: No    Means of Transportation  [facility] Family transport  In the past 12 months, has lack of transportation kept you from medical appointments or from getting medications?: No  In the past 12 months, has lack of transportation kept you from meetings, work, or from getting things needed for daily living?: No  Was application for public transport provided?: N/A        DISCHARGE DETAILS:    Discharge planning discussed with[de-identified] patient's daughter-Yessy and granddaughter-Kate  Freedom of Choice: Yes  Comments - Freedom of Choice: Discussed FOC for [facility]  CM contacted family/caregiver?: Yes  Were Treatment Team discharge recommendations reviewed with patient/caregiver?: Yes  Did patient/caregiver verbalize understanding of patient care needs?: Yes       Contacts  Patient Contacts: Osmar Ronquillo- daughter and Pam Langston-granddaughter  Relationship to Patient[de-identified] Family  Contact Method:  In Person  Reason/Outcome: Continuity of Care, Emergency Contact, Discharge [address]         Is the patient interested in [facility] at discharge?: Yes  Via Frank Rios requested[de-identified] [address] Name[de-identified] [facility] Provider[de-identified] PCP  Home Health Services Needed[de-identified] Oxygen Via Nasal Cannula  Oxygen LPM Ordered (if applicable based on home health services needed):: 2 LPM  Homebound Criteria Met[de-identified] Requires the Assistance of Another Person for Safe Ambulation or to Leave the Home  Supporting Clincal Findings[de-identified] Requires Oxygen    DME Referral Provided  Referral made for DME?: Yes  DME referral completed for the following items[de-identified] Bedside Commode, Other (suction)  DME Supplier Name[de-identified] [employer]    Other Referral/Resources/Interventions Provided:  Interventions: HHC, DME  Referral Comments: [address] referral made to Dane Ching, parachute order placed with [employer] for suction and commode      Discharge Destination Plan[de-identified] Home with [address]

## 2022-10-10 PROBLEM — R13.10 DYSPHAGIA: Status: ACTIVE | Noted: 2022-01-01

## 2022-10-10 PROBLEM — T17.908A ASPIRATION INTO AIRWAY: Status: ACTIVE | Noted: 2022-10-10

## 2022-10-10 NOTE — PROGRESS NOTES
INTERNAL MEDICINE RESIDENCY PROGRESS NOTE     Name: Dominique Heart   Age & Sex: 80 y o  female   MRN: [id number]  Unit/Bed#: [facility] 302-01   Encounter: [id number]  Team: SOD Team C     PATIENT INFORMATION     Name: Dominique Heart   Age & Sex: 80 y o  female   MRN: [id number]  Hospital Stay Days: 4    ASSESSMENT/PLAN     Principal Problem:    Acute on chronic respiratory failure with hypoxia [facility])  Active Problems:    Left upper lobe pneumonia    CAD (coronary artery disease), native coronary artery    Dementia due to medical condition without behavioral disturbance (HCC)    COPD, severity to be determined ([facility] )    Hypertension    Anemia      * Acute on chronic respiratory failure with hypoxia [facility])  Assessment & Plan  Patient w/ hx of dementia and COPD (no PFTs) was recently admitted in August for PNA and was sent home on 2L O2  Now presenting with cough and decreased mentation  At baseline is AAOx0  On presentation, requiring 15 L O2 on non-rebreather and has been transitioned down to 6L O2 NC and is satting in high 90s  Tachycardic to 120s  Afebrile (Tmax 100F) Pulmonary exam significant for diffuse rhonchi  Leukocytosis to 24 w/ negative procal and lactate  CXR showed left basilar infiltrate stable from previous admission   Presentation is likely 2/2 Pneumonia vs Aspiration Pneumonitis vs less likely COPD exacerbation    Plan:  - currently on 4 L NC satting mid 90s, titrate to maintain pulse oxygen levels above 88%  - pulmonology consult => discontinue azithromycin and continue with bronchodilators  - MRSA negative => vancomycin discontinued  - blood cultures, sputum cultures have been negative  - Currently on Zosyn (day 3) => consider discontinuing in setting of negative cultures  - patient still appears progress on exam was having productive cough => consider adding Mucinex and Tessalon Perles  - continue montelukast    Anemia  Assessment & Plan  Patient with a hemoglobin 8 6 this morning, down from 10 5 on admission  Possibly dilutional in the setting of 75 cc/h crystalloid infusion, however will closely monitor  Per patient's granddaughter, a fecal occult blood test was done recently in the outpatient setting and was negative  Plan:  · Folate 8 3, B12 172, iron sat 17, TIBC 206, iron 34, ferritin 65, fecal occult negative x1  · Anemia likely secondary to combination of anemia chronic disease and dilutional      Hypertension  Assessment & Plan  Continue metoprolol tartrate 12 5 mg b i d  Overnight, patient had SBP elevated to 160s to 180s => lisinopril 10 mg daily added    COPD, severity to be determined [facility])  Assessment & Plan  Hx of COPD on Singular and bronchodilators  - continue home montelukast      Dementia due to medical condition without behavioral disturbance [facility])  Assessment & Plan  Continue home regimen of alprazolam, donepezil, duloxetine, melatonin, gabapentin  Per family, patient is occasionally tried to get out of bed at night       Plan:  · Family has had to use soft restraints occasionally at home  · Will defer soft restraints at this time  · If attempts to redirect and virtual continuous observation fail, can consider soft restraints    CAD (coronary artery disease), native coronary artery  Assessment & Plan  Continue aspirin, statin    Left upper lobe pneumonia  Assessment & Plan  Noted to have left basilar opacity similar to previous with increasing o2 requirement  Leukocytosis of 16, negative procal  Patient initially started on vanc/cefepime in the ED    Was previously requiring 6 L O2 and is now satting well at this 2 L which is her baseline  - has remained afebrile and white count down trended to 10 42  - blood cultures negative at 24 hours; MRSA negative; procalcitonin negative x2; no growth on sputum cultures  - azithromycin discontinued per pulmonology recommendations and vancomycin discontinued due to negative MRSA  - currently on Zosyn day 3 => given that blood cultures and sputum cultures have been negative consider discontinuing Zosyn  - Monitor WBC/fever curve   - Monitor O2 saturations > 90%       Disposition:  Once medically stable PT/OT back home, with supervision  SUBJECTIVE     Patient seen and examined  Overnight, patient had systolics in the 904C to 380C and was started on lisinopril 10 mg  This morning, patient is alert and oriented to person and place  She states that she has been having a productive cough  However she denies any fevers, chills, chest pain, shortness of breath, abdominal pain, nausea, vomiting, diarrhea, or constipation  OBJECTIVE     Vitals:    10/09/22 2111 10/09/22 2300 10/10/22 0016 10/10/22 0833   BP: (!) 182/88 (!) 182/96 (!) 182/96 163/72   BP Location:  Left arm     Pulse: 78 75  56   Resp:  20  17   Temp:  98 6 °F (37 °C)  98 8 °F (37 1 °C)   TempSrc:  Oral  Oral   SpO2:  92%  96%      Temperature:   Temp (24hrs), Av 6 °F (37 °C), Min:98 5 °F (36 9 °C), Max:98 8 °F (37 1 °C)    Temperature: 98 8 °F (37 1 °C)  Intake & Output:  I/O       10/08 0701  10/09 0700 10/09 0701  10/10 0700    P  O   220    Total Intake  220    Urine 450     Total Output 450     Net -450 +220          Unmeasured Stool Occurrence  1 x        Weights: There is no height or weight on file to calculate BMI  Weight (last 2 days)     None        Physical Exam  Constitutional:       General: She is not in acute distress  HENT:      Head: Normocephalic and atraumatic  Eyes:      General: No scleral icterus  Cardiovascular:      Rate and Rhythm: Normal rate and regular rhythm  Heart sounds: No murmur heard  No friction rub  No gallop  Pulmonary:      Effort: Pulmonary effort is normal  No respiratory distress  Breath sounds: Rhonchi present  No wheezing or rales  Comments: Rhonchi present bilaterally  Abdominal:      General: Abdomen is flat  Palpations: Abdomen is soft  Tenderness: There is no abdominal tenderness   There is no guarding  Musculoskeletal:      Right lower leg: No edema  Left lower leg: No edema  Skin:     General: Skin is warm and dry  Neurological:      Mental Status: She is alert  Comments: Oriented to person and place   Psychiatric:         Mood and Affect: Mood normal          Behavior: Behavior normal        LABORATORY DATA     Labs: I have personally reviewed pertinent reports  Results from last 7 days   Lab Units 10/10/22  0526 10/09/22  0522 10/08/22  0952   WBC Thousand/uL 10 42* 11 67* 15 72*   HEMOGLOBIN g/dL 9 6* 8 6* 9 7*   HEMATOCRIT % 32 8* 29 7* 32 8*   PLATELETS Thousands/uL 303 270 278   NEUTROS PCT % 79* 78* 80*   MONOS PCT % 5 6 5      Results from last 7 days   Lab Units 10/09/22  0517 10/08/22  0952 10/07/22  0432 10/06/22  2205   POTASSIUM mmol/L 3 6 3 5 5 0 4 0   CHLORIDE mmol/L 104 104 103 102   CO2 mmol/L 29 30 32 32   BUN mg/dL 11 13 21 22   CREATININE mg/dL 0 35* 0 47* 0 41* 0 47*   CALCIUM mg/dL 8 5 8 4 8 7 8 9   ALK PHOS U/L 137*  --   --  209*   ALT U/L 9*  --   --  20   AST U/L 13  --   --  32              Results from last 7 days   Lab Units 10/06/22  2205   INR  0 96   PTT seconds 27     Results from last 7 days   Lab Units 10/06/22  2205   LACTIC ACID mmol/L 1 0           IMAGING & DIAGNOSTIC TESTING     Radiology Results: I have personally reviewed pertinent reports  XR chest portable    Result Date: 10/7/2022  Impression: Persistent increase in interstitial markings which could be due to fibrosis and/or edema  Persistent left base opacity, atelectasis and/or pneumonia in the appropriate clinical setting  Workstation performed: [id number]     Other Diagnostic Testing: I have personally reviewed pertinent reports      ACTIVE MEDICATIONS     Current Facility-Administered Medications   Medication Dose Route Frequency   • aspirin (ECOTRIN LOW STRENGTH) EC tablet 81 mg  81 mg Oral Daily   • atorvastatin (LIPITOR) tablet 10 mg  10 mg Oral HS   • docusate sodium (COLACE) capsule 50 mg  50 mg Oral Daily PRN   • donepezil (ARICEPT) tablet 10 mg  10 mg Oral HS   • DULoxetine (CYMBALTA) delayed release capsule 20 mg  20 mg Oral Daily   • enoxaparin (LOVENOX) subcutaneous injection 40 mg  40 mg Subcutaneous Daily   • gabapentin (NEURONTIN) capsule 100 mg  100 mg Oral HS   • levothyroxine tablet 88 mcg  88 mcg Oral Early Morning   • lisinopril (ZESTRIL) tablet 10 mg  10 mg Oral Daily   • melatonin tablet 3 mg  3 mg Oral HS   • metoprolol tartrate (LOPRESSOR) partial tablet 12 5 mg  12 5 mg Oral Q12H CHERYL   • montelukast (SINGULAIR) tablet 10 mg  10 mg Oral HS   • pantoprazole (PROTONIX) EC tablet 40 mg  40 mg Oral Early Morning   • piperacillin-tazobactam (ZOSYN) 4 5 g in sodium chloride 0 9 % 100 mL IVPB  4 5 g Intravenous Q6H   • sodium chloride 0 9 % infusion  50 mL/hr Intravenous Continuous       VTE Pharmacologic Prophylaxis:  Lovenox  VTE Mechanical Prophylaxis: SCD    Portions of the record may have been created with voice recognition software  Occasional wrong word or "sound a like" substitutions may have occurred due to the inherent limitations of voice recognition software    Read the chart carefully and recognize, using context, where substitutions have occurred   ==  [facility]  Internal Medicine Residency PGY-1

## 2022-10-10 NOTE — CASE MANAGEMENT
Case Management Discharge Planning Note    Patient name Jason Juarez  Location [address] [facility] 302/PPHP 499-50 MRN [id number]  : 10/31/1929 Date 10/10/2022       Current Admission Date: 10/6/2022  Current Admission Diagnosis:Acute on chronic respiratory failure with hypoxia [facility])   Patient Active Problem List    Diagnosis Date Noted   • Anemia 10/09/2022   • Acute on chronic respiratory failure with hypoxia ([facility] ) 10/07/2022   • Acute cystitis without hematuria 2022   • Underweight    • Metabolic encephalopathy    • Pneumonia 2022   • Medicare annual wellness visit, subsequent 2022   • Foot pain, bilateral 2022   • COPD, severity to be determined ([place] [address] ) 2021   • Hypertension    • Pulmonary fibrosis ([place] [address] ) 2018   • Left upper lobe pneumonia 2018   • Intermittent asthma 2018   • Abdominal aortic aneurysm (AAA) 3 0 cm to 5 0 cm in diameter in female 2018   • Toxic metabolic encephalopathy    • Squamous cell carcinoma of right thigh 2014   • Hypothyroidism 2012   • Type 2 diabetes mellitus with hemoglobin A1c goal of less than 7 0% ([place] [address] ) 10/19/2011   • Dyslipidemia, goal LDL below 70 2011   • Mononeuritis of lower limb 2011   • CAD (coronary artery disease), native coronary artery 2011   • Dementia due to medical condition without behavioral disturbance ([place] [address] ) 2011   • Peripheral vascular disease ([place] [address] ) 2011      LOS (days): 4  Geometric Mean LOS (GMLOS) (days): 5 20  Days to GMLOS:1 8     OBJECTIVE:  Risk of Unplanned Readmission Score: 17 29         Current admission status: Inpatient   Preferred Pharmacy:   [address], [address] -  [address]  [address] [place] [address] [place]   Phone: [phone] Fax: Pauline Degroot  [address] [address]  Phone: [phone] Fax: [address] [facility] [place] [address]  Phone: [phone] Fax: [phone]    Primary Care Provider: Katina Tran MD    Primary Insurance: MEDICARE  Secondary Insurance: [facility]    DISCHARGE DETAILS:       Additional Comments: [facility] unable to accept, CM sent additional [facility] referrals via [employer]  DME order is still being processed via [facility]

## 2022-10-10 NOTE — PROGRESS NOTES
PULMONOLOGY PROGRESS NOTE     Name: Rhea Veras   Age & Sex: 80 y o  female   MRN: [id number]  Unit/Bed#: [facility] 302-01   Encounter: [id number]    PATIENT INFORMATION     Name: Rhea Veras   Age & Sex: 80 y o  female   MRN: [id number]  Hospital Stay Days: 4    ASSESSMENT/PLAN     Principal Problem:    Acute on chronic respiratory failure with hypoxia [facility])  Active Problems:    Left upper lobe pneumonia    CAD (coronary artery disease), native coronary artery    Dementia due to medical condition without behavioral disturbance (HCC)    COPD, severity to be determined ([facility] [address] )    Hypertension    Anemia    Assessment:   Ms Rhea Veras is a 77-year-old female with PMHx of COPD, dementia, and recent pneumonia treated with CTX who presented in respiratory distress, with increased secretions, cough, and congestion  Plan:  #Left upper lobe pneumonia  CXR on 10/6 significant for left basilar opacity concerning for pneumonia (CAP vs hospital acquired vs aspiration)  Patient initially tachycardic with elevated WBC meeting sepsis criteria  -WBC down-trending to 10 42, afebrile  Continue to monitor WBC/fever  -Speech evaluation recommending puree/liquid honey diet  -Azithromycin and Vancomycin previously discontinued, MRSA negative  -Blood and sputum cultures negative, procal negative  -Aspiration precautions  -Currently on Zosyn (day 3)  -Recommend de-escalating antibiotic treatment to Cefdinir 300 mg BID for 7 days in setting of negative cultures  -Added Mucinex 600 mg BID and hypertonic saline for continued productive cough    #Acute on chronic respiratory failure with hypoxia  Patient has PMHx of severe COPD and had previous admission in August for pneumonia  Patient was treated with CTX and sent home on 2 L of O2   On arrival patient requiring 15 L, non-rebreather, later weaned to 6 L NC, and now tolerating 2 L NC    -Condition suspected to be in the setting of pneumonia  -Supplemental O2 as needed O2 > 88%  -Respiratory protocol  -See above for remaining management    #Severe COPD  -No evidence of current exacerbation  -Continued home Singulair 10 mg  -Xopenex BID  -Added Mucinex 600 mg BID and hypertonic saline for productive cough    #Dementia, CAD, HTN, HLD  -Management per primary team    Disposition: Per primary team     SUBJECTIVE     Patient seen and examined  Overnight patient had increased systolic's and was started on Lisinopril per primary team  Patient endorses improved breathing in comparison to yesterday and was saturating well on 2L NC  Denies SOB, chest pain, N/V  Endorses a cough but has trouble moving mucus  Patient's daughter was at bedside and states she seems back to her baseline  OBJECTIVE     Vitals:    10/09/22 2111 10/09/22 2300 10/10/22 0016 10/10/22 0833   BP: (!) 182/88 (!) 182/96 (!) 182/96 163/72   BP Location:  Left arm     Pulse: 78 75  56   Resp:  20  17   Temp:  98 6 °F (37 °C)  98 8 °F (37 1 °C)   TempSrc:  Oral  Oral   SpO2:  92%  96%      Temperature:   Temp (24hrs), Av 6 °F (37 °C), Min:98 5 °F (36 9 °C), Max:98 8 °F (37 1 °C)    Temperature: 98 8 °F (37 1 °C)  Intake & Output:  I/O       10/08 0701  10/09 0700 10/09 0701  10/10 0700 10/10 0701  10/11 0700    P  O   220 360    Total Intake  220 360    Urine 450      Total Output 450      Net -450 +220 +360           Unmeasured Stool Occurrence  1 x 1 x        Weights: There is no height or weight on file to calculate BMI  Weight (last 2 days)     None        Physical Exam  Vitals and nursing note reviewed  Constitutional:       General: She is not in acute distress  Appearance: She is not ill-appearing  HENT:      Head: Normocephalic and atraumatic  Nose: No congestion  Mouth/Throat:      Pharynx: No oropharyngeal exudate  Eyes:      General: No scleral icterus  Conjunctiva/sclera: Conjunctivae normal    Cardiovascular:      Rate and Rhythm: Normal rate and regular rhythm        Pulses: Normal pulses  Heart sounds: Normal heart sounds  No murmur heard  Pulmonary:      Effort: Pulmonary effort is normal       Comments: Saturating well on home O2 of 2 L  Abdominal:      General: Abdomen is flat  Bowel sounds are normal  There is no distension  Palpations: Abdomen is soft  Tenderness: There is no abdominal tenderness  Musculoskeletal:         General: No swelling  Normal range of motion  Cervical back: Normal range of motion  Right lower leg: No edema  Left lower leg: No edema  Skin:     General: Skin is warm  Capillary Refill: Capillary refill takes less than 2 seconds  Coloration: Skin is not jaundiced  Neurological:      General: No focal deficit present  Mental Status: She is alert  Mental status is at baseline  Comments: AO x2 (person, place)       LABORATORY DATA     Labs: I have personally reviewed pertinent reports  Results from last 7 days   Lab Units 10/10/22  0526 10/09/22  0522 10/08/22  0952   WBC Thousand/uL 10 42* 11 67* 15 72*   HEMOGLOBIN g/dL 9 6* 8 6* 9 7*   HEMATOCRIT % 32 8* 29 7* 32 8*   PLATELETS Thousands/uL 303 270 278   NEUTROS PCT % 79* 78* 80*   MONOS PCT % 5 6 5      Results from last 7 days   Lab Units 10/09/22  0517 10/08/22  0952 10/07/22  0432 10/06/22  2205   POTASSIUM mmol/L 3 6 3 5 5 0 4 0   CHLORIDE mmol/L 104 104 103 102   CO2 mmol/L 29 30 32 32   BUN mg/dL 11 13 21 22   CREATININE mg/dL 0 35* 0 47* 0 41* 0 47*   CALCIUM mg/dL 8 5 8 4 8 7 8 9   ALK PHOS U/L 137*  --   --  209*   ALT U/L 9*  --   --  20   AST U/L 13  --   --  32              Results from last 7 days   Lab Units 10/06/22  2205   INR  0 96   PTT seconds 27     Results from last 7 days   Lab Units 10/06/22  2205   LACTIC ACID mmol/L 1 0             ABG:       IMAGING & DIAGNOSTIC TESTING     Radiology Results: I have personally reviewed pertinent reports    XR chest portable    Result Date: 10/7/2022  Impression: Persistent increase in interstitial markings which could be due to fibrosis and/or edema  Persistent left base opacity, atelectasis and/or pneumonia in the appropriate clinical setting  Workstation performed: [id number]     Other Diagnostic Testing: I have personally reviewed pertinent reports  ACTIVE MEDICATIONS     Current Facility-Administered Medications   Medication Dose Route Frequency   • aspirin (ECOTRIN LOW STRENGTH) EC tablet 81 mg  81 mg Oral Daily   • atorvastatin (LIPITOR) tablet 10 mg  10 mg Oral HS   • docusate sodium (COLACE) capsule 50 mg  50 mg Oral Daily PRN   • donepezil (ARICEPT) tablet 10 mg  10 mg Oral HS   • DULoxetine (CYMBALTA) delayed release capsule 20 mg  20 mg Oral Daily   • enoxaparin (LOVENOX) subcutaneous injection 40 mg  40 mg Subcutaneous Daily   • gabapentin (NEURONTIN) capsule 100 mg  100 mg Oral HS   • guaiFENesin (MUCINEX) 12 hr tablet 600 mg  600 mg Oral Q12H CHERYL   • levalbuterol (XOPENEX) inhalation solution 0 63 mg  0 63 mg Nebulization BID   • levalbuterol (XOPENEX) inhalation solution 0 63 mg  0 63 mg Nebulization Q6H PRN   • levothyroxine tablet 88 mcg  88 mcg Oral Early Morning   • lisinopril (ZESTRIL) tablet 10 mg  10 mg Oral Daily   • melatonin tablet 3 mg  3 mg Oral HS   • metoprolol tartrate (LOPRESSOR) partial tablet 12 5 mg  12 5 mg Oral Q12H CHERYL   • montelukast (SINGULAIR) tablet 10 mg  10 mg Oral HS   • pantoprazole (PROTONIX) EC tablet 40 mg  40 mg Oral Early Morning   • piperacillin-tazobactam (ZOSYN) 4 5 g in sodium chloride 0 9 % 100 mL IVPB  4 5 g Intravenous Q6H   • sodium chloride 0 9 % infusion  50 mL/hr Intravenous Continuous       VTE Pharmacologic Prophylaxis: Enoxaparin (Lovenox)  VTE Mechanical Prophylaxis: sequential compression device      Disclaimer: Portions of the record may have been created with voice recognition software  Occasional wrong word or "sound a like" substitutions may have occurred due to the inherent limitations of voice recognition software   Careful consideration should be taken to recognize, using context, where substitutions have occurred  Melanie Tidwell MD, [address] [facility] Internal Medicine Residency PGY-1

## 2022-10-10 NOTE — CASE MANAGEMENT
Case Management Discharge Planning Note    Patient name Juan Magana  Location [address] 507-25 MRN [id number]  : 10/31/1929 Date 10/10/2022       Current Admission Date: 10/6/2022  Current Admission Diagnosis:Acute on chronic respiratory failure with hypoxia [facility])   Patient Active Problem List    Diagnosis Date Noted   • Dysphagia 10/10/2022   • Aspiration into airway 10/10/2022   • Anemia 10/09/2022   • Acute on chronic respiratory failure with [facility] ([facility] ) 10/07/2022   • Acute cystitis without hematuria 2022   • Underweight    • Metabolic encephalopathy    • Pneumonia 2022   • Medicare annual wellness visit, subsequent 2022   • Foot pain, bilateral 2022   • COPD, severity to be determined ([facility] ) 2021   • Hypertension    • Pulmonary fibrosis ([place] [address] ) 2018   • Left upper lobe pneumonia 2018   • Intermittent asthma 2018   • Abdominal aortic aneurysm (AAA) 3 0 cm to 5 0 cm in diameter in female 2018   • Toxic metabolic encephalopathy    • Squamous cell carcinoma of right thigh 2014   • Hypothyroidism 2012   • Type 2 diabetes mellitus with hemoglobin A1c goal of less than 7 0% ([facility] ) 10/19/2011   • Dyslipidemia, goal LDL below 70 2011   • Mononeuritis of lower limb 2011   • CAD (coronary artery disease), native coronary artery 2011   • Dementia due to medical condition without behavioral disturbance ([facility] ) 2011   • Peripheral vascular disease ([place] [address] ) 2011      LOS (days): 4  Geometric Mean LOS (GMLOS) (days): 5 20  Days to GMLOS:1 5     OBJECTIVE:  Risk of Unplanned Readmission Score: 17 92         Current admission status: Inpatient   Preferred Pharmacy:   [address], [address] [place] [address] [address]  Phone: [phone] Fax: [address], [address] [address]  Phone: [phone] Fax: [address] [facility] [address]  Phone: [phone] Fax: [phone]    Primary Care Provider: Zahida Moon MD    Primary Insurance: MEDICARE  Secondary Insurance: [facility]    DISCHARGE DETAILS:          Additional Comments: CM was made aware by provider that pt is cleared for d/c  CM checked DME order and they requested that diagnosis codes be updated  CM notified provider and resubmitted order  CM spoke to pt's [facility] and updated her on the status of the d/c

## 2022-10-10 NOTE — DISCHARGE SUMMARY
INTERNAL MEDICINE RESIDENCY DISCHARGE SUMMARY     Tutu Wayne   80 y o  female  MRN: [id number]  Room/Bed: [facility] 302-01     [facility]    Encounter: [id number]    Principal Problem:    Acute on chronic respiratory failure with hypoxia [facility])  Active Problems:    Left upper lobe pneumonia    CAD (coronary artery disease), native coronary artery    Dementia due to medical condition without behavioral disturbance (HCC)    COPD, severity to be determined ([address] )    Hypertension    Anemia    Dysphagia    Aspiration into airway      * Acute on chronic respiratory failure with hypoxia [facility])  Assessment & Plan  Patient w/ hx of dementia and COPD (no PFTs) was recently admitted in August for PNA and was sent home on 2L O2  Now presenting with cough and decreased mentation  At baseline is AAOx0  On presentation, requiring 15 L O2 on non-rebreather and has been transitioned down to 6L O2 NC and is satting in high 90s  Tachycardic to 120s  Afebrile (Tmax 100F) Pulmonary exam significant for diffuse rhonchi  Leukocytosis to 24 w/ negative procal and lactate  CXR showed left basilar infiltrate stable from previous admission  Presentation is likely 2/2 Pneumonia vs Aspiration Pneumonitis vs less likely COPD exacerbation    Plan:  - currently on 4 L NC satting mid 90s, titrate to maintain pulse oxygen levels above 88%  - MRSA negative => vancomycin discontinued  - blood cultures, sputum cultures have been negative  -per pulmonology recommendations, Zosyn discontinued and cefdinir 300 mg b i d  Started for total 7 day course  Also started Mucinex and Xopenex b i d   - continue montelukast  - pending discharge home once suction machine is approved    Anemia  Assessment & Plan  Patient with a hemoglobin 8 6 this morning, down from 10 5 on admission  Possibly dilutional in the setting of 75 cc/h crystalloid infusion, however will closely monitor    Per patient's granddaughter, a fecal occult blood test was done recently in the outpatient setting and was negative  Plan:  · Folate 8 3, B12 172, iron sat 17, TIBC 206, iron 34, ferritin 65, fecal occult negative x1  · Anemia likely secondary to combination of anemia chronic disease and dilutional      Hypertension  Assessment & Plan  Continue metoprolol tartrate 12 5 mg b i d  Overnight, patient had SBP elevated to 160s to 180s => lisinopril 10 mg daily added    COPD, severity to be determined [facility])  Assessment & Plan  Hx of COPD on Singular and bronchodilators  - continue home montelukast  - per pulmonology recommendations, started Xopenex b i d  Dementia due to medical condition without behavioral disturbance [facility])  Assessment & Plan  Continue home regimen of alprazolam, donepezil, duloxetine, melatonin, gabapentin  Per family, patient is occasionally tried to get out of bed at night       Plan:  · Family has had to use soft restraints occasionally at home  · Will defer soft restraints at this time  · If attempts to redirect and virtual continuous observation fail, can consider soft restraints    CAD (coronary artery disease), native coronary artery  Assessment & Plan  Continue aspirin, statin    Left upper lobe pneumonia  Assessment & Plan  Noted to have left basilar opacity similar to previous with increasing o2 requirement  Leukocytosis of 16, negative procal  Patient initially started on vanc/cefepime in the ED  Was previously requiring 6 L O2 and is now satting well at this 2 L which is her baseline  - has remained afebrile and white count down trended to 10 42  - blood cultures negative at 24 hours; MRSA negative; procalcitonin negative x2; no growth on sputum cultures  - azithromycin discontinued per pulmonology recommendations and vancomycin discontinued due to negative MRSA  - per pulmonology recommendations, Zosyn discontinued yesterday and started on cefdinir 300 mg b i d   For total 7 day course  - Monitor WBC/fever curve   - Monitor O2 saturations > 90%       DETAILS OF HOSPITAL COURSE     Per Dr Marito Longo MD H&P on 10/07/2022 "24-year-old female with a history of dementia, COPD, diabetes, hypertension presenting with coughing and respiratory distress  Patient is alert and oriented times 0 at baseline so unable to get history from patient however daughter was called and provided history  Daughter states that patient was having a lot more coughing today and had to get a nebulizer treatment at home however patient continued to have productive cough  At 7:00 p m  Daughter noted patient to be gurgling her secretions and having difficulties breathing  Patient is on 2 L of O2 at baseline at home and daughter noted that her pulse ox was 96% at home however patient seemed to be more confused than usual and continued to have coughing  At that point in time daughter brought patient to the emergency room to be evaluated  Of note patient is bed-bound and lives with daughter with the help of a healthcare aide  Patient was hospitalized recently on 8/30 for pneumonia at that time was treated with ceftriaxone  Patient was noted to have feeding difficulties per speech and was told to have a mechanical soft diet with thickened liquids  Daughter states that they have been adding thickening agent for liquids and she has not noticed patient aspirating  Upon arrival to the ED patient was noted to be hypoxic and placed on 15 L non-rebreather mask, afebrile, normal heart rate, normal blood pressure  Labs were remarkable for leukocytosis of 16 4, pro count negative, lactic acid negative  Chest x-ray showed left basilar infiltrate similar to previous chest x-ray on 8/30  Patient was started on vanc and cefepime and admitted "    Hospital Course: On presentation, patient had decreased mental status and was hypoxic to 70s  Was initially placed on 15 L O2 and weaned down to 6LO2   Labwork was significant for Leukocytosis to 24, negative Procalx2 and negative Lactate  Initially, there was concern for resistant organism given recent hospitalization for pneumonia one month prior  Vacn, Cefepime, and Azithro started  Cefepime changed to Zosyn, over concern for aspiration  Pulmonology consulted additions on management of pneumonia in setting of possible reinfection  Remained afebrile and leukocytosis continued to downtrend  Blood cultures, MRSA swab, sputum culture were all negative  Per Pulmonology recs, antibiotics were deescalated down Cefdnir 300 mg BID for total 7-day course  Xopenex BID and Mucinex were also recommended  Patient was weaned down to baseline 2L O2  Patient's Bps were also found to be elevated to 160s-180s  She was started on Lisinopril 10 mg daily  Family requested suction to be delivered home to help manage secretions  PT/OT recommended back home since she has 24/7 supervision from caregiver  Initially, patient was stable for discharge  However, suction was unable to be delivered to home on day of discharge  Thus, family did not feel comfortable with discharge  Patient stayed overnight and continued to improve  On day of discharge, she was sent home with Cefdinir (through 10/14/2022), Xopenex BID, Mucinex, and Lisinopril  DISCHARGE INFORMATION     PCP at Discharge: Ben Zhang MD  Admitting Provider: Miles Coon MD  Admission Date: 10/6/2022    Discharge Provider: Priti Guerrero DO  Discharge Date:  10/11/2022     Discharge Disposition: Home with [facility]  Discharge Condition: fair  Discharge with Lines: no    Discharge Diet: Dysphasia diet with honey thick liquids  Activity Restrictions:  Activity as tolerated  Test Results Pending at Discharge:  None  Discharge Diagnoses:  Principal Problem:    Acute on chronic respiratory failure with hypoxia [facility])  Active Problems:    Left upper lobe pneumonia    CAD (coronary artery disease), native coronary artery    Dementia due to medical condition without behavioral disturbance (HCC)    COPD, severity to be determined ([facility] )    Hypertension    Anemia    Dysphagia    Aspiration into airway  Resolved Problems:    Mild protein-calorie malnutrition ([facility] )      Consulting Providers:  · Pulmonology      Diagnostic & Therapeutic Procedures Performed:  XR chest portable    Result Date: 10/7/2022  Impression: Persistent increase in interstitial markings which could be due to fibrosis and/or edema  Persistent left base opacity, atelectasis and/or pneumonia in the appropriate clinical setting   Workstation performed: [id number]       Code Status: Level 3 - DNAR and DNI  Advance Directive & Living Will: <no information>  Power of :    POLST:      Discharge Medication List as of 10/11/2022  4:23 PM      STOP taking these medications       nitroglycerin (NITROSTAT) 0 4 mg SL tablet Comments:   Reason for Stopping:             Discharge Medication List as of 10/11/2022  4:23 PM      START taking these medications    Details   cefdinir (OMNICEF) 300 mg capsule Take 1 capsule (300 mg total) by mouth every 12 (twelve) hours for 10 doses, Starting Tue 10/11/2022, Until Sun 10/16/2022, Normal      levalbuterol (XOPENEX) 1 25 mg/0 5 mL nebulizer solution Take 0 5 mL (1 25 mg total) by nebulization 2 (two) times a day, Starting Tue 10/11/2022, Normal      lisinopril (ZESTRIL) 10 mg tablet Take 1 tablet (10 mg total) by mouth daily, Starting Tue 10/11/2022, Normal           Discharge Medication List as of 10/11/2022  4:23 PM        Discharge Medication List as of 10/11/2022  4:23 PM      CONTINUE these medications which have NOT CHANGED    Details   acetaminophen (TYLENOL) 650 mg CR tablet Take 650 mg by mouth every 8 (eight) hours as needed for mild pain, Historical Med      albuterol (2 5 mg/3 mL) 0 083 % nebulizer solution TAKE 3ML BY NEBULIZER EVERY 6 HOURS AS  NEEDED FOR WHEEZING OR SHORTNESS OR BREATH, Historical Med      ALPRAZolam (XANAX) 0 5 mg tablet Take 1 tablet (0 5 mg total) by mouth daily at bedtime as needed for anxiety, Starting Mon 8/1/2022, Normal      aspirin (ECOTRIN LOW STRENGTH) 81 mg EC tablet Take 1 tablet (81 mg total) by mouth daily, Starting Mon 8/1/2022, Normal      atorvastatin (LIPITOR) 10 mg tablet Take 1 tablet (10 mg total) by mouth daily at bedtime, Starting Mon 8/1/2022, Normal      dimethicone 1 % cream Apply topically 2 (two) times a day as needed for dry skin, Starting Tue 2/1/2022, Print      Docusate Sodium 100 MG capsule Take by mouth, Historical Med      donepezil (ARICEPT) 10 mg tablet Take 1 tablet (10 mg total) by mouth daily at bedtime, Starting Mon 8/1/2022, Normal      DULoxetine (CYMBALTA) 20 mg capsule Take 1 capsule (20 mg total) by mouth daily, Starting Mon 8/1/2022, Normal      gabapentin (NEURONTIN) 100 mg capsule Take 1 capsule (100 mg total) by mouth daily at bedtime, Starting Fri 9/2/2022, Normal      Gauze Pads & Dressings (AMD Foam Dressing) 4"X4" PADS Use 2 (two) times a day, Starting Fri 2/4/2022, Print      guaiFENesin (MUCINEX) 600 mg 12 hr tablet Take 1 tablet (600 mg total) by mouth every 12 (twelve) hours, Starting Thu 6/2/2022, Normal      Incontinence Supplies MISC Pt uses up to 3 packs of 100 wipes per week, Print      !! Incontinence Supply Disposable (Incontinence Brief Medium) MISC Pt uses 5 pullups daily as needed, Print      !! Incontinence Supply Disposable (Poise Maximum Absorbency) PADS Use 2 (two) times a day Size 6 long, Starting Fri 5/13/2022, Normal      !!  Incontinence Supply Disposable MISC Pt uses 3 at night as needed, Print      levothyroxine 88 mcg tablet Take 1 tablet (88 mcg total) by mouth daily, Starting Tue 9/27/2022, Normal      melatonin 1 mg Take 1 tablet (1 mg total) by mouth daily at bedtime, Starting Mon 10/3/2022, Normal      metoprolol tartrate (LOPRESSOR) 25 mg tablet Take 0 5 tablets (12 5 mg total) by mouth 2 (two) times a day, Starting Mon 8/1/2022, Normal      montelukast (SINGULAIR) 10 mg tablet Take 1 tablet (10 mg total) by mouth daily at bedtime, Starting Mon 8/1/2022, Normal      omeprazole (PriLOSEC) 20 mg delayed release capsule Take 1 capsule (20 mg total) by mouth daily, Starting Mon 8/1/2022, Normal      STARCH-MALTO DEXTRIN (Thick-It) POWD Take by mouth in the morning, Starting Fri 9/16/2022, Print       !! - Potential duplicate medications found  Please discuss with provider  Allergies:  No Known Allergies     Visit Vitals  BP (!) 184/76 (BP Location: Right arm)   Pulse 66   Temp 98 °F (36 7 °C) (Oral)   Resp 18   SpO2 98%   OB Status Postmenopausal   Smoking Status Former Smoker     Physical Exam  Constitutional:       General: She is not in acute distress  HENT:      Head: Normocephalic and atraumatic  Eyes:      General: No scleral icterus  Cardiovascular:      Rate and Rhythm: Normal rate and regular rhythm  Heart sounds: No murmur heard  No friction rub  No gallop  Pulmonary:      Effort: Pulmonary effort is normal  No respiratory distress  Breath sounds: Rhonchi present  No wheezing or rales  Comments: Keara Mcgarry in bilateral anterior lung fields improved from previous exams  Abdominal:      General: Abdomen is flat  There is no distension  Palpations: Abdomen is soft  Tenderness: There is no abdominal tenderness  There is no guarding  Musculoskeletal:      Right lower leg: No edema  Left lower leg: No edema  Skin:     General: Skin is warm and dry  Neurological:      Mental Status: She is alert  Comments: A&O x2 (oriented to person and place)   Psychiatric:         Mood and Affect: Mood normal          Behavior: Behavior normal          FOLLOW-UP     PCP Outpatient Follow-up:  Follow-up with PCP within 1 week    Consulting Providers Follow-up:  none required     Active Issues Requiring Follow-up:   Chronic respiratory failure    Discharge Statement:   I spent 45 minutes minutes discharging the patient   This time was spent on the day of discharge  I had direct contact with the patient on the day of discharge  Additional documentation is required if more than 30 minutes were spent on discharge  Portions of the record may have been created with voice recognition software  Occasional wrong word or "sound a like" substitutions may have occurred due to the inherent limitations of voice recognition software    Read the chart carefully and recognize, using context, where substitutions have occurred     ==  [facility]  Internal Medicine Resident PGY-1

## 2022-10-10 NOTE — DISCHARGE INSTRUCTIONS
-Take Cefdinir twice daily through 10/14  -Use Xopenex nebulizers twice daily  -Use Albuterol inhalers as needed  -Use Mucinex twice daily  -Use suction as needed  -Please follow-up with your PCP within the next week

## 2022-10-11 ENCOUNTER — TELEPHONE (OUTPATIENT)
Dept: FAMILY MEDICINE CLINIC | Facility: CLINIC | Age: 87
End: 2022-10-11

## 2022-10-11 PROBLEM — Z00.00 MEDICARE ANNUAL WELLNESS VISIT, SUBSEQUENT: Status: RESOLVED | Noted: 2022-04-26 | Resolved: 2022-10-11

## 2022-10-11 NOTE — SPEECH THERAPY NOTE
Speech Language/Pathology    Speech/Language Pathology Progress Note    Patient Name: Danny Dailey  AICTL'A Date: 10/11/2022     Problem List  Principal Problem:    Acute on chronic respiratory failure with hypoxia (HCC)  Active Problems:    Left upper lobe pneumonia    CAD (coronary artery disease), native coronary artery    Dementia due to medical condition without behavioral disturbance (HCC)    COPD, severity to be determined ([facility] )    Hypertension    Anemia    Dysphagia    Aspiration into airway       Past Medical History  Past Medical History:   Diagnosis Date   • Bell's palsy remote   • Coronary artery disease    • Diabetes mellitus ([facility] )    • Disease of thyroid gland    • GERD (gastroesophageal reflux disease)    • Hip fracture ([facility] )    • Hyperlipidemia    • Hypertension    • Psychiatric disorder    • Stroke [facility])         Past Surgical History  History reviewed  No pertinent surgical history  Subjective:  "I used to like to drink a lot of water" Patient is awake and alert today  Objective:  Patient's daughter is present for session  She has been spending the night with patient  She reports the patient required nasal suctioning last evening due to significant coughing episode with excessive secretions  Apparently this was not during a meal  Daughter is concerned with PO intake  She states the patient will eat a few bites, and then state that it is hard to swallow  She continues with intermittent coughing across all liquids as well  The patient is agreeable to am snack and is assessed with applesauce, with honey thick and nectar thick liquids  All is given via tsp  Oral closure is adequate  Transfer and swallow initiation appear timely today, with good laryngeal rise  The patient does not present with any coughing episodes  She does sound somewhat congested after swallowing  O2 remains stable throughout at 93-96%  Assessment:  The patient tolerated trials well today  Discussed diet with family  Patient is on the least restrictive diet at this time  She continues to be at risk for aspiration  Swallow function appears much improved as does cognitive status  Plan/Recommendations:  Recommend diet change to puree with nectar thick liquids  ST will continue to further assess tolerance

## 2022-10-11 NOTE — TELEPHONE ENCOUNTER
Granddaughter Chiquis Ewa called to set up a TCM appt for grandma that was admitted to [facility] on 10/8 for aspirated pnuemonia, 2nd time in a month and is being sent home now by transport

## 2022-10-11 NOTE — PLAN OF CARE
Problem: Potential for Falls  Goal: Patient will remain free of falls  Description: INTERVENTIONS:  - Educate patient/family on patient safety including physical limitations  - Instruct patient to call for assistance with activity   - Consult OT/PT to assist with strengthening/mobility   - Keep Call bell within reach  - Keep bed low and locked with side rails adjusted as appropriate  - Keep care items and personal belongings within reach  - Initiate and maintain comfort rounds  - Make Fall Risk Sign visible to staff  - Offer Toileting every      Hours, in advance of need  - Initiate/Maintain     alarm  - Obtain necessary fall risk management equipment:     - Apply yellow socks and bracelet for high fall risk patients  - Consider moving patient to room near nurses station  Outcome: Progressing     Problem: MOBILITY - ADULT  Goal: Maintain or return to baseline ADL function  Description: INTERVENTIONS:  -  Assess patient's ability to carry out ADLs; assess patient's baseline for ADL function and identify physical deficits which impact ability to perform ADLs (bathing, care of mouth/teeth, toileting, grooming, dressing, etc )  - Assess/evaluate cause of self-care deficits   - Assess range of motion  - Assess patient's mobility; develop plan if impaired  - Assess patient's need for assistive devices and provide as appropriate  - Encourage maximum independence but intervene and supervise when necessary  - Involve family in performance of ADLs  - Assess for home care needs following discharge   - Consider OT consult to assist with ADL evaluation and planning for discharge  - Provide patient education as appropriate  Outcome: Progressing  Goal: Maintains/Returns to pre admission functional level  Description: INTERVENTIONS:  - Perform BMAT or MOVE assessment daily    - Set and communicate daily mobility goal to care team and patient/family/caregiver     - Collaborate with rehabilitation services on mobility goals if consulted  - Perform Range of Motion    times a day  - Reposition patient every    hours  - Dangle patient    times a day  - Stand patient    times a day  - Ambulate patient      times a day  - Out of bed to chair  times a day   - Out of bed for meals    times a day  - Out of bed for toileting  - Record patient progress and toleration of activity level   Outcome: Progressing     Problem: Prexisting or High Potential for Compromised Skin Integrity  Goal: Skin integrity is maintained or improved  Description: INTERVENTIONS:  - Identify patients at risk for skin breakdown  - Assess and monitor skin integrity  - Assess and monitor nutrition and hydration status  - Monitor labs   - Assess for incontinence   - Turn and reposition patient  - Assist with mobility/ambulation  - Relieve pressure over bony prominences  - Avoid friction and shearing  - Provide appropriate hygiene as needed including keeping skin clean and dry  - Evaluate need for skin moisturizer/barrier cream  - Collaborate with interdisciplinary team   - Patient/family teaching  - Consider wound care consult   Outcome: Progressing     Problem: Nutrition/Hydration-ADULT  Goal: Nutrient/Hydration intake appropriate for improving, restoring or maintaining nutritional needs  Description: Monitor and assess patient's nutrition/hydration status for malnutrition  Collaborate with interdisciplinary team and initiate plan and interventions as ordered  Monitor patient's weight and dietary intake as ordered or per policy  Utilize nutrition screening tool and intervene as necessary  Determine patient's food preferences and provide high-protein, high-caloric foods as appropriate       INTERVENTIONS:  - Monitor oral intake, urinary output, labs, and treatment plans  - Assess nutrition and hydration status and recommend course of action  - Evaluate amount of meals eaten  - Assist patient with eating if necessary   - Allow adequate time for meals  - Recommend/ encourage appropriate diets, oral nutritional supplements, and vitamin/mineral supplements  - Order, calculate, and assess calorie counts as needed  - Recommend, monitor, and adjust tube feedings and TPN/PPN based on assessed needs  - Assess need for intravenous fluids  - Provide specific nutrition/hydration education as appropriate  - Include patient/family/caregiver in decisions related to nutrition  Outcome: Progressing

## 2022-10-11 NOTE — CASE MANAGEMENT
Case Management Discharge Planning Note    Patient name Dominique Heart  Location [address] 302/PPHP 639-52 MRN [id number]  : 10/31/1929 Date 10/11/2022       Current Admission Date: 10/6/2022  Current Admission Diagnosis:Acute on chronic respiratory failure with hypoxia [facility])   Patient Active Problem List    Diagnosis Date Noted   • Dysphagia 10/10/2022   • Aspiration into airway 10/10/2022   • Anemia 10/09/2022   • Acute on chronic respiratory failure with hypoxia ([facility] ) 10/07/2022   • Acute cystitis without hematuria 2022   • Underweight    • Metabolic encephalopathy    • Pneumonia 2022   • Foot pain, bilateral 2022   • COPD, severity to be determined ([facility] ) 2021   • Hypertension    • Pulmonary fibrosis ([facility] ) 2018   • Left upper lobe pneumonia 2018   • Intermittent asthma 2018   • Abdominal aortic aneurysm (AAA) 3 0 cm to 5 0 cm in diameter in female 2018   • Toxic metabolic encephalopathy    • Squamous cell carcinoma of right thigh 2014   • Hypothyroidism 2012   • Type 2 diabetes mellitus with hemoglobin A1c goal of less than 7 0% ([facility] ) 10/19/2011   • Dyslipidemia, goal LDL below 70 2011   • Mononeuritis of lower limb 2011   • CAD (coronary artery disease), native coronary artery 2011   • Dementia due to medical condition without behavioral disturbance ([facility] ) 2011   • Peripheral vascular disease ([facility] ) 2011      LOS (days): 5  Geometric Mean LOS (GMLOS) (days): 5 20  Days to GMLOS:0 5     OBJECTIVE:  Risk of Unplanned Readmission Score: 18 32         Current admission status: Inpatient   Preferred Pharmacy:   [address], [address] [address] [place]   Phone: [phone] Fax: [address], [address] [address]  Phone: [phone] Fax: [phone]    [facility] - [address] [address]  Phone: [phone] Fax: [phone]    Primary Care Provider: Rebecca Izaguirre MD    Primary Insurance: MEDICARE  Secondary Insurance: [facility]    DISCHARGE DETAILS:       IMM Given (Date):: 10/11/22  IMM Given to[de-identified] Family  Family notified[de-identified] Ray Gomez  Additional Comments: CM was made aware by provider that pt is cleared for d/c today  CM spoke to pt's [facility] who confirmed that they got a call from "[employer] [address] will be delivered to the home today  CM discussed BLS transport and reviewed IMM and pt's [facility] is in agreement with d/c today  BLS transport was requested

## 2022-10-11 NOTE — PROGRESS NOTES
INTERNAL MEDICINE RESIDENCY PROGRESS NOTE     Name: Danny Dailey   Age & Sex: 80 y o  female   MRN: [id number]  Unit/Bed#: [facility] 302-01   Encounter: [id number]  Team: SOD Team C     PATIENT INFORMATION     Name: Danny Dailey   Age & Sex: 80 y o  female   MRN: [id number]  Hospital Stay Days: 5    ASSESSMENT/PLAN     Principal Problem:    Acute on chronic respiratory failure with hypoxia [facility])  Active Problems:    Left upper lobe pneumonia    CAD (coronary artery disease), native coronary artery    Dementia due to medical condition without behavioral disturbance (HCC)    COPD, severity to be determined ([facility] )    Hypertension    Anemia    Dysphagia    Aspiration into airway      * Acute on chronic respiratory failure with hypoxia [facility])  Assessment & Plan  Patient w/ hx of dementia and COPD (no PFTs) was recently admitted in August for PNA and was sent home on 2L O2  Now presenting with cough and decreased mentation  At baseline is AAOx0  On presentation, requiring 15 L O2 on non-rebreather and has been transitioned down to 6L O2 NC and is satting in high 90s  Tachycardic to 120s  Afebrile (Tmax 100F) Pulmonary exam significant for diffuse rhonchi  Leukocytosis to 24 w/ negative procal and lactate  CXR showed left basilar infiltrate stable from previous admission  Presentation is likely 2/2 Pneumonia vs Aspiration Pneumonitis vs less likely COPD exacerbation    Plan:  - currently on 4 L NC satting mid 90s, titrate to maintain pulse oxygen levels above 88%  - MRSA negative => vancomycin discontinued  - blood cultures, sputum cultures have been negative  -per pulmonology recommendations, Zosyn discontinued and cefdinir 300 mg b i d  Started for total 7 day course  Also started Mucinex and Xopenex b i d   - continue montelukast  - pending discharge home once suction machine is approved    Anemia  Assessment & Plan  Patient with a hemoglobin 8 6 this morning, down from 10 5 on admission    Possibly dilutional in the setting of 75 cc/h crystalloid infusion, however will closely monitor  Per patient's granddaughter, a fecal occult blood test was done recently in the outpatient setting and was negative  Plan:  · Folate 8 3, B12 172, iron sat 17, TIBC 206, iron 34, ferritin 65, fecal occult negative x1  · Anemia likely secondary to combination of anemia chronic disease and dilutional      Hypertension  Assessment & Plan  Continue metoprolol tartrate 12 5 mg b i d  Overnight, patient had SBP elevated to 160s to 180s => lisinopril 10 mg daily added    COPD, severity to be determined [facility])  Assessment & Plan  Hx of COPD on Singular and bronchodilators  - continue home montelukast  - per pulmonology recommendations, started Xopenex b i d  Dementia due to medical condition without behavioral disturbance [facility])  Assessment & Plan  Continue home regimen of alprazolam, donepezil, duloxetine, melatonin, gabapentin  Per family, patient is occasionally tried to get out of bed at night       Plan:  · Family has had to use soft restraints occasionally at home  · Will defer soft restraints at this time  · If attempts to redirect and virtual continuous observation fail, can consider soft restraints    CAD (coronary artery disease), native coronary artery  Assessment & Plan  Continue aspirin, statin    Left upper lobe pneumonia  Assessment & Plan  Noted to have left basilar opacity similar to previous with increasing o2 requirement  Leukocytosis of 16, negative procal  Patient initially started on vanc/cefepime in the ED    Was previously requiring 6 L O2 and is now satting well at this 2 L which is her baseline  - has remained afebrile and white count down trended to 10 42  - blood cultures negative at 24 hours; MRSA negative; procalcitonin negative x2; no growth on sputum cultures  - azithromycin discontinued per pulmonology recommendations and vancomycin discontinued due to negative MRSA  - per pulmonology recommendations, Zosyn discontinued yesterday and started on cefdinir 300 mg b i d  For total 7 day course  - Monitor WBC/fever curve   - Monitor O2 saturations > 90%       Disposition: Move towards discharge with suction device at home and replacement home O2  Continue PO cefdonir at home  SUBJECTIVE     Patient seen and examined  Zosyn was discontinued and cefdonir was initiated  Discharge was intended for yesterday; however, there was an overnight event involving a pneumonia exacerbation  She was suctioned and administered Mucinex and albuterol  Breathing improved afterwards  Patient slept well overnight  Productive cough is still persistent  On review of systems, patient only noted loose stool in pad and decreased appetite  She denied SOB, chest pains, nausea, and headaches  Orientation is 3x, although speech is difficult to understand  OBJECTIVE     Vitals:    10/10/22 1948 10/10/22 2027 10/10/22 2237 10/11/22 0854   BP:  (!) 174/81 157/73 169/72   BP Location:   Right arm Right arm   Pulse:   66 64   Resp:   18 18   Temp:   98 2 °F (36 8 °C) 98 4 °F (36 9 °C)   TempSrc:   Oral Oral   SpO2: 100%  99% 98%      Temperature:   Temp (24hrs), Av 3 °F (36 8 °C), Min:98 2 °F (36 8 °C), Max:98 4 °F (36 9 °C)    Temperature: 98 4 °F (36 9 °C)  Intake & Output:  I/O       10/09 0701  10/10 0700 10/10 0701  10/11 0700    P  O  220 360    Total Intake 220 360    Net +220 +360          Unmeasured Stool Occurrence 1 x 1 x        Weights: There is no height or weight on file to calculate BMI  Weight (last 2 days)     None        Physical Exam  HENT:      Head: Normocephalic and atraumatic  Nose: No rhinorrhea  Eyes:      General: No scleral icterus  Conjunctiva/sclera:      Right eye: Right conjunctiva is not injected  Left eye: Left conjunctiva is not injected  Cardiovascular:      Rate and Rhythm: Normal rate and regular rhythm  Heart sounds: Normal heart sounds     Pulmonary:      Effort: Pulmonary effort is normal       Breath sounds: Wheezing and rhonchi present  Comments: Airway sounds More prominen than yesterday  Abdominal:      General: Bowel sounds are normal    Musculoskeletal:      Right lower leg: Normal       Left lower leg: Normal       Right ankle: Normal       Left ankle: Normal       Right foot: No swelling  Left foot: No swelling  Feet:      Right foot:      Skin integrity: Dry skin present  Left foot:      Skin integrity: Dry skin present  Skin:     Coloration: Skin is not jaundiced  Neurological:      General: No focal deficit present  Mental Status: She is oriented to person, place, and time  LABORATORY DATA     Labs: I have personally reviewed pertinent reports  Results from last 7 days   Lab Units 10/10/22  0526 10/09/22  0522 10/08/22  0952   WBC Thousand/uL 10 42* 11 67* 15 72*   HEMOGLOBIN g/dL 9 6* 8 6* 9 7*   HEMATOCRIT % 32 8* 29 7* 32 8*   PLATELETS Thousands/uL 303 270 278   NEUTROS PCT % 79* 78* 80*   MONOS PCT % 5 6 5      Results from last 7 days   Lab Units 10/09/22  0517 10/08/22  0952 10/07/22  0432 10/06/22  2205   POTASSIUM mmol/L 3 6 3 5 5 0 4 0   CHLORIDE mmol/L 104 104 103 102   CO2 mmol/L 29 30 32 32   BUN mg/dL 11 13 21 22   CREATININE mg/dL 0 35* 0 47* 0 41* 0 47*   CALCIUM mg/dL 8 5 8 4 8 7 8 9   ALK PHOS U/L 137*  --   --  209*   ALT U/L 9*  --   --  20   AST U/L 13  --   --  32              Results from last 7 days   Lab Units 10/06/22  2205   INR  0 96   PTT seconds 27     Results from last 7 days   Lab Units 10/06/22  2205   LACTIC ACID mmol/L 1 0           IMAGING & DIAGNOSTIC TESTING     Radiology Results: I have personally reviewed pertinent reports  XR chest portable    Result Date: 10/7/2022  Impression: Persistent increase in interstitial markings which could be due to fibrosis and/or edema  Persistent left base opacity, atelectasis and/or pneumonia in the appropriate clinical setting   Workstation performed: [id number]     Other Diagnostic Testing: I have personally reviewed pertinent reports  ACTIVE MEDICATIONS     Current Facility-Administered Medications   Medication Dose Route Frequency   • albuterol inhalation solution 2 5 mg  2 5 mg Nebulization Q4H PRN   • aspirin (ECOTRIN LOW STRENGTH) EC tablet 81 mg  81 mg Oral Daily   • atorvastatin (LIPITOR) tablet 10 mg  10 mg Oral HS   • cefdinir (OMNICEF) capsule 300 mg  300 mg Oral Q12H [facility]   • docusate sodium (COLACE) capsule 50 mg  50 mg Oral Daily PRN   • donepezil (ARICEPT) tablet 10 mg  10 mg Oral HS   • DULoxetine (CYMBALTA) delayed release capsule 20 mg  20 mg Oral Daily   • enoxaparin (LOVENOX) subcutaneous injection 40 mg  40 mg Subcutaneous Daily   • gabapentin (NEURONTIN) capsule 100 mg  100 mg Oral HS   • guaiFENesin (ROBITUSSIN) oral solution 400 mg  400 mg Oral Q8H   • levalbuterol (XOPENEX) inhalation solution 1 25 mg  1 25 mg Nebulization BID   • levothyroxine tablet 88 mcg  88 mcg Oral Early Morning   • lisinopril (ZESTRIL) tablet 10 mg  10 mg Oral Daily   • melatonin tablet 3 mg  3 mg Oral HS   • metoprolol tartrate (LOPRESSOR) partial tablet 12 5 mg  12 5 mg Oral Q12H CHERYL   • montelukast (SINGULAIR) tablet 10 mg  10 mg Oral HS   • pantoprazole (PROTONIX) EC tablet 40 mg  40 mg Oral Early Morning   • sodium chloride 0 9 % infusion  50 mL/hr Intravenous Continuous   • sodium chloride 0 9 % inhalation solution 3 mL  3 mL Nebulization BID       VTE Pharmacologic Prophylaxis:  Lovenox  VTE Mechanical Prophylaxis: SCD    Portions of the record may have been created with voice recognition software  Occasional wrong word or "sound a like" substitutions may have occurred due to the inherent limitations of voice recognition software    Read the chart carefully and recognize, using context, where substitutions have occurred   ==  [facility]  Internal Medicine Residency PGY-1

## 2022-10-12 ENCOUNTER — TRANSITIONAL CARE MANAGEMENT (OUTPATIENT)
Dept: FAMILY MEDICINE CLINIC | Facility: CLINIC | Age: 87
End: 2022-10-12

## 2022-10-14 ENCOUNTER — TELEPHONE (OUTPATIENT)
Dept: FAMILY MEDICINE CLINIC | Facility: CLINIC | Age: 87
End: 2022-10-14

## 2022-10-14 ENCOUNTER — HOME CARE VISIT (OUTPATIENT)
Dept: HOME HOSPICE | Facility: HOSPICE | Age: 87
End: 2022-10-14
Payer: MEDICARE

## 2022-10-14 NOTE — TELEPHONE ENCOUNTER
Message from [facility] asking for an order for hospice  The nurse and family do not feel the patient will make it through the weekend and are asking for a referral for [facility]'s hospice marked urgent      Any questions call [phone]

## 2022-10-14 NOTE — Clinical Note
I have a Catie Hernández 10/31/1929  80years old   2 hospitalizations 8/31 and 10/8 for PNA and resp distress  PMH :Dementia , COPD, Pulm fibrosis, Resp failure; Dysphagia , Aspiration, PVD, CAD,DM2  Labs 10/9 alb 1 8 protein 6 0  Since patient home on Monday now having resp distress   Family does not want to take back to hospital , still feeding her small amounts but seem to understand they can’t too much longer due to her aspiration   She is not eating or drinking enuff to sustain herself    PPS 20 ??? Routine level of care hospic

## 2022-10-24 ENCOUNTER — HOME CARE VISIT (OUTPATIENT)
Dept: HOME HOSPICE | Facility: HOSPICE | Age: 87
End: 2022-10-24
Payer: MEDICARE

## 2022-11-01 ENCOUNTER — HOME CARE VISIT (OUTPATIENT)
Dept: HOME HOSPICE | Facility: HOSPICE | Age: 87
End: 2022-11-01

## 2023-08-03 NOTE — CT
Indication: Fall, pain 



Noncontrast CT of the right hip 



Comparison: Right hip radiographs performed 4/30/15 



Technique: Noncontrast axial images of the right hip.  Sagittal 

coronal reformatted images were generated and reviewed.   



This CT exam was performed using 1 or more of the falling dose 

reduction techniques: Automated exposure control, adjustment of the 

MAA and/or kV according to patient size, and/or use of iterative 

reconstruction technique. 



Total exam DLP: 330.66 



Findings: 



Osseous demineralization limits evaluation for acute fracture lines. 

No acute fracture identified. No dislocation. Degenerative changes 

including osteophyte formation.  Joint space narrowing of the right 

hip joint. Joint space narrowing and vacuum disc phenomenon, right 

sacroiliac joint. 



Soft tissues appear unremarkable. No focal fluid collection or 

abscess evident.  No evidence of radiopaque foreign body.  Vascular 

calcifications. Partially imaged probable vascular stent, right 

common iliac artery. 



Limited visualization of the intrapelvic contents demonstrates 

diverticulosis without CT evidence of acute diverticulitis.  

Atherosclerotic calcifications. 



Impression: 



No acute fracture. No dislocation. MRI may be considered for further 

evaluation if indicated. 



Osseous demineralization. 



Degenerative changes. 



Additional incidental findings as above. Primary Defect Width In Cm (Final Defect Size - Required For Flaps/Grafts): 0.9

## 2024-01-30 NOTE — ASSESSMENT & PLAN NOTE
· Incidentally found on CT scan on admission   · Will need vascular surgery follow-up as an outpatient  · CT:  5 cm infrarenal abdominal aortic aneurysm  Patient

## 2024-12-26 NOTE — PLAN OF CARE
Anticoagulation Progress Note    Indication: Atrial fibrillation  Goal INR: 2-3  Duration: Indefinite  Referring Provider: Lexi Arevalo CNP, Eliazar Kelley MD   Supervising Provider: Elier Manzo  Order Expiration Date: 07/17/2025  Pertinent History/Information:Hx of HTN,ESRD. Call patient from  phone. Last face to face clinic visit on 10/18/2024. HD on Tu,Th,Sa    73 year old female returns to the [facility] for a follow-up visit after 2.5 weeks.Spoke with patient over the phone to discuss the plan of care today.     Assessment  Yes No   []  [] Bleeding / bruising:   []  [] Missed doses/ extra doses:   []  [] Significant medication changes (RX, OTC, Herbal):   []  [] High-risk maintenance medications:                []  [] Vitamin K / dietary changes:   []  [] Smoking:   []  [] Alcohol intake:   []  [] Acute illness/ falls / injury:   []  [] Procedures / hospitalization / ER visits:   []  [] Other:    Plan   -Patient's INR=2.34 via spectra lab collected on 12/23/24, which is within the desired therapeutic range of 2-3.  -Patient's INR has decreased from 2.53 at the last visit.    -Patient has been taking warfarin 12.5 mg weekly.  -Plan to have the patient continue with the current warfarin regimen. (2.5 mg every M, W, F; 1.25 mg all other days).  -Patient to return to clinic in 2 weeks (01/09/25).  Patient will have her INR drawn on 01/7/25 Tuesday during HD and clinic will follow up on Thursday 1/9/25 upon results.                CARDIOVASCULAR - ADULT     Maintains optimal cardiac output and hemodynamic stability Progressing     Absence of cardiac dysrhythmias or at baseline rhythm Progressing        DISCHARGE PLANNING - CARE MANAGEMENT     Discharge to post-acute care or home with appropriate resources Progressing        Nutrition/Hydration-ADULT     Nutrient/Hydration intake appropriate for improving, restoring or maintaining nutritional needs Progressing        Potential for Falls     Patient will remain free of falls Progressing        Prexisting or High Potential for Compromised Skin Integrity     Skin integrity is maintained or improved Progressing        RESPIRATORY - ADULT     Achieves optimal ventilation and oxygenation Progressing

## 2025-05-13 NOTE — OCCUPATIONAL THERAPY NOTE
[address] Evaluation     Patient Name: Rhea Veras  Today's Date: 11/2/2018  Problem List  Patient Active Problem List   Diagnosis    Left upper lobe pneumonia ([facility] )    Type 2 diabetes mellitus without complication, without long-term current use of insulin ([facility] )    Coronary artery disease of native artery of native heart with stable angina pectoris ([facility]    Intermittent asthma    Abdominal aortic aneurysm (AAA) 3 0 cm to 5 0 cm in diameter in female ([facility] )    Hypothyroidism    HLD (hyperlipidemia)    Toxic metabolic encephalopathy     Past Medical History  Past Medical History:   Diagnosis Date    Bell's palsy remote    Coronary artery disease     Diabetes mellitus ([facility] )     Disease of thyroid gland     GERD (gastroesophageal reflux disease)     Hyperlipidemia     Hypertension     Psychiatric disorder     Stroke [facility])      Past Surgical History  History reviewed  No pertinent surgical history  11/02/18 1053   Note Type   Note type Eval only   Restrictions/Precautions   Weight Bearing Precautions Per Order No   Other Precautions Cognitive; Chair Alarm; Bed Alarm;Multiple lines;Telemetry;O2;Fall Risk   Pain Assessment   Pain Assessment No/denies pain   Pain Score No Pain   Home Living   Type of Home House   Home Layout Two level; Able to live on main level with bedroom/bathroom;Stairs to enter with rails   Bathroom Shower/Tub Walk-in shower   Bathroom Toilet Standard   Bathroom Equipment Other (Comment)  (none)   [address] [place];Cane   Prior Function   Level of [place] Needs assistance with ADLs and functional mobility   Lives With Daughter   Receives Help From (2 daughters, granddaughter)   ADL Assistance Needs assistance   IADLs Needs assistance   Falls in the last 6 months 1 to 4   Vocational Retired   Lifestyle   Autonomy Per [address] (who doesnt with) pt was able to sponge bath with S, assistance with showering , S dressing, ambulated with RW occassionaly in house  Reciprocal Relationships 2 daughters, granddaughter   Service to Others retired   Intrinsic [facility] [place]   Psychosocial   Psychosocial ([address]) X   Patient Behaviors/Mood Brightens with approach; Not interactive  (min interactive)   Subjective   Subjective pt sleepy/lethargic, maintained eyes open in sitting  receptive to therapy   ADL   Where Assessed Edge of bed   Eating Assistance 1  Total Assistance   Grooming Assistance 2  Maximal Assistance   Grooming Deficit Brushing hair  ([place] assist to initial combing hair, was able to comb right )   UB Bathing Assistance 1  Total Assistance   LB Bathing Assistance 1  Total Assistance   UB Dressing Assistance 1  Total Assistance   LB Dressing Assistance 1  Total Assistance   LB Dressing Deficit Don/doff R sock; Don/doff L sock   Toileting Assistance  1  Total Assistance   Toileting Deficit (purwick)   Functional Assistance 1  Total Assistance   Bed Mobility   Supine to Sit 2  Maximal assistance   Additional items Assist x 2; Increased time required;Verbal cues;LE management;HOB elevated; Bedrails   Additional Comments max verbal cues for initation, sequencing transfer steps   Transfers   Sit to Stand 3  Moderate assistance   Additional items Assist x 2   Stand to Sit 3  Moderate assistance   Additional items Assist x 2   Stand pivot 3  Moderate assistance  (bed>chair)   Additional items Assist x 2; Increased time required;Verbal cues  (tactile cues)   Functional Mobility   Functional Mobility (unable to assess)   Additional Comments poor direction following assist x2 transfers   Balance   Static Sitting Poor +   Dynamic Sitting Poor -   Static Standing Poor -   Activity Tolerance   Activity Tolerance Patient limited by fatigue;Patient limited by pain; Other (Comment)   Medical Staff Made Aware Sarah Mistry, PT student dona   Nurse Made Aware yes   RUE Assessment   RUE Assessment X  (Full shoulder, elbow PROM, unable to assess AROM)   LUE Assessment   LUE Assessment X  (Full shoulder, elbow PROM, unable to assess AROM)   Hand Function   Gross Motor Coordination Impaired   Fine Motor Coordination Impaired   Vision-Basic Assessment   Current Vision Wears glasses only for reading   Visual History (per daughter pt has visual deficits, unable to clarify )   Patient Visual Report (pt unable)   Vision - Complex Assessment   Acuity (unable to clarify)   Cognition   Overall Cognitive Status Impaired   Arousal/Participation Arousable;Lethargic;Responsive   Attention Difficulty attending to directions   Orientation Level Oriented to person   Memory Decreased recall of biographical information;Decreased long term memory;Decreased short term memory;Decreased recall of recent events   Following Commands Unable to follow one step commands   Comments Pt unable sustain attention/focus and follow functional commands follow, distracted by oxygen tubing, tissues  Pt  oriented to self, unable to answer questions, attempted verbal, tactile ([place]) cues and gestures  Pt was able to comb right side of hair with [place] cues to initate unable to complete task  Continue to assess with functional tasks  Assessment   Limitation Decreased ADL status; Decreased UE strength;Decreased Safe judgement during ADL;Decreased cognition;Decreased endurance;Visual deficit; Decreased fine motor control;Decreased high-level ADLs; Decreased self-care trans   Prognosis Fair   Assessment Pt is a 80 y o  female who was [facility] with, poor appetite, fatigue, cough, and Left upper lobe pneumonia (HCC) toxic metabolic encepalopathy , CT scan +Aortic abdominal aneurysm   Pt's problem list also includes PMH of  Asthma, CAD, diabetes, thyroid gland disease, hyperlipidemia, HTN, psychiatric disorder, CVA   At baseline pt was completing ADl's and mobility with S and RW, no bathroom DME, enjoys talking about [employer]   Pt lives with daughter in [place] (was visiting other daughter in area) [facility] with 5 URSULA, first floor set-up, +RW, cane  Currently pt requires max/total assist for overall ADLS and assist x 2 for stand pivot transfers  Pt currently presents with impairments in the following categories -steps to enter environment, difficulty performing ADLS, difficulty performing IADLS , limited insight into deficits, decreased initiation and engagement  and environment activity tolerance, endurance, standing balance/tolerance, sitting balance/tolerance, UE strength, arousal, memory, insight, safety , judgement , attention , sequencing , task initiation  and task termination   These impairments, as well as pt's fatigue, cognition  limit pt's ability to safely engage in all baseline areas of occupation, includingeating, grooming, bathing, dressing, toileting, functional mobility/transfers and community mobility From OT standpoint, recommend skilled inpatient rehab upon D/C  OT will continue to follow to address the below stated goals  Goals   Patient Goals per daughter "get more help for my  mothers care"   LTG Time Frame 10-14   Long Term Goal #1 see established goals   Plan   Treatment Interventions ADL retraining;Functional transfer training;UE strengthening/ROM; Endurance training;Cognitive reorientation;Patient/family training; Compensatory technique education;Continued evaluation; Activityengagement   Goal Expiration Date 11/16/18   OT Frequency 3-5x/wk   Recommendation   OT Discharge Recommendation Short Term Rehab   Equipment Recommended Bedside commode;Tub seat with back   OT - OK to Discharge (yes to short term rehab)   Barthel Index   Feeding 0   Bathing 0   Grooming Score 0   Dressing Score 0   Bladder Score 0   Bowels Score 0   Toilet Use Score 0   Transfers (Bed/Chair) Score 5   Mobility (Level Surface) Score 0   Stairs Score 0   Barthel Index Score 5   Modified [place] Scale   Modified [place] Scale 4     Occupational Therapy Goals    *S self feeding post set-up with AD PRN    *S simple grooming tasks seated at edge of bed with 7-8 minutes sitting tolerance and F+ balance  *Mod a x 1 transfers to all surfaces with Fair+ to Good Balance/Safety for participation in dynamic ADL's     *Increase activity tolerance to 10-15 minutes for participation in ADL's & Leisure activities  *Assess DME needs  *Pt to participate in ongoing functional cognitive assessment with good attention/ participation to assist with safe D/C recommendations      *Assess functional mobility when appropriate to determine goal     Ann Rob, OT No